# Patient Record
Sex: MALE | Race: WHITE | NOT HISPANIC OR LATINO | Employment: FULL TIME | URBAN - NONMETROPOLITAN AREA
[De-identification: names, ages, dates, MRNs, and addresses within clinical notes are randomized per-mention and may not be internally consistent; named-entity substitution may affect disease eponyms.]

---

## 2018-02-08 ENCOUNTER — OFFICE VISIT CONVERTED (OUTPATIENT)
Dept: FAMILY MEDICINE CLINIC | Age: 43
End: 2018-02-08
Attending: FAMILY MEDICINE

## 2018-09-21 ENCOUNTER — OFFICE VISIT CONVERTED (OUTPATIENT)
Dept: FAMILY MEDICINE CLINIC | Age: 43
End: 2018-09-21
Attending: FAMILY MEDICINE

## 2019-03-12 ENCOUNTER — HOSPITAL ENCOUNTER (OUTPATIENT)
Dept: OTHER | Facility: HOSPITAL | Age: 44
Discharge: HOME | End: 2019-03-12
Attending: FAMILY MEDICINE

## 2019-03-12 ENCOUNTER — OFFICE VISIT CONVERTED (OUTPATIENT)
Dept: FAMILY MEDICINE CLINIC | Age: 44
End: 2019-03-12
Attending: FAMILY MEDICINE

## 2019-03-12 LAB
ALBUMIN SERPL-MCNC: 4.3 G/DL (ref 3.5–5)
ALBUMIN/GLOB SERPL: 1.3 {RATIO} (ref 1.4–2.6)
ALP SERPL-CCNC: 120 U/L (ref 53–128)
ALT SERPL-CCNC: 36 U/L (ref 10–40)
ANION GAP SERPL CALC-SCNC: 18 MMOL/L (ref 8–19)
AST SERPL-CCNC: 22 U/L (ref 15–50)
BILIRUB SERPL-MCNC: 0.55 MG/DL (ref 0.2–1.3)
BUN SERPL-MCNC: 12 MG/DL (ref 5–25)
BUN/CREAT SERPL: 18 {RATIO} (ref 6–20)
CALCIUM SERPL-MCNC: 9.4 MG/DL (ref 8.7–10.4)
CHLORIDE SERPL-SCNC: 99 MMOL/L (ref 99–111)
CHOLEST SERPL-MCNC: 135 MG/DL (ref 107–200)
CHOLEST/HDLC SERPL: 3.1 {RATIO} (ref 3–6)
CONV CO2: 23 MMOL/L (ref 22–32)
CONV CREATININE URINE, RANDOM: 58.8 MG/DL (ref 10–300)
CONV MICROALBUM.,U,RANDOM: <12 MG/L (ref 0–20)
CONV TOTAL PROTEIN: 7.6 G/DL (ref 6.3–8.2)
CREAT UR-MCNC: 0.68 MG/DL (ref 0.7–1.2)
ERYTHROCYTE [DISTWIDTH] IN BLOOD BY AUTOMATED COUNT: 11.4 % (ref 11.5–14.5)
EST. AVERAGE GLUCOSE BLD GHB EST-MCNC: 148 MG/DL
GFR SERPLBLD BASED ON 1.73 SQ M-ARVRAT: >60 ML/MIN/{1.73_M2}
GLOBULIN UR ELPH-MCNC: 3.3 G/DL (ref 2–3.5)
GLUCOSE SERPL-MCNC: 219 MG/DL (ref 70–99)
HBA1C MFR BLD: 17.7 G/DL (ref 14–18)
HBA1C MFR BLD: 6.8 % (ref 3.5–5.7)
HCT VFR BLD AUTO: 51.2 % (ref 42–52)
HDLC SERPL-MCNC: 43 MG/DL (ref 40–60)
LDLC SERPL CALC-MCNC: 75 MG/DL (ref 70–100)
MCH RBC QN AUTO: 33.1 PG (ref 27–31)
MCHC RBC AUTO-ENTMCNC: 34.6 G/DL (ref 33–37)
MCV RBC AUTO: 95.9 FL (ref 80–96)
MICROALBUMIN/CREAT UR: 20.4 MG/G{CRE} (ref 0–25)
OSMOLALITY SERPL CALC.SUM OF ELEC: 288 MOSM/KG (ref 273–304)
PLATELET # BLD AUTO: 239 10*3/UL (ref 130–400)
PMV BLD AUTO: 10.2 FL (ref 7.4–10.4)
POTASSIUM SERPL-SCNC: 3.8 MMOL/L (ref 3.5–5.3)
RBC # BLD AUTO: 5.34 10*6/UL (ref 4.7–6.1)
SODIUM SERPL-SCNC: 136 MMOL/L (ref 135–147)
TESTOST SERPL-MCNC: 417 NG/DL (ref 249–836)
TRIGL SERPL-MCNC: 85 MG/DL (ref 40–150)
TSH SERPL-ACNC: 1.42 M[IU]/L (ref 0.27–4.2)
VLDLC SERPL-MCNC: 17 MG/DL (ref 5–37)
WBC # BLD AUTO: 11.62 10*3/UL (ref 4.8–10.8)

## 2019-09-03 ENCOUNTER — OFFICE VISIT CONVERTED (OUTPATIENT)
Dept: FAMILY MEDICINE CLINIC | Age: 44
End: 2019-09-03
Attending: NURSE PRACTITIONER

## 2019-09-10 ENCOUNTER — OFFICE VISIT CONVERTED (OUTPATIENT)
Dept: FAMILY MEDICINE CLINIC | Age: 44
End: 2019-09-10
Attending: FAMILY MEDICINE

## 2019-10-26 ENCOUNTER — HOSPITAL ENCOUNTER (OUTPATIENT)
Dept: OTHER | Facility: HOSPITAL | Age: 44
Discharge: HOME | End: 2019-10-26
Attending: FAMILY MEDICINE

## 2019-10-26 LAB
ALBUMIN SERPL-MCNC: 4.4 G/DL (ref 3.5–5)
ALBUMIN/GLOB SERPL: 1.8 {RATIO} (ref 1.4–2.6)
ALP SERPL-CCNC: 101 U/L (ref 53–128)
ALT SERPL-CCNC: 32 U/L (ref 10–40)
ANION GAP SERPL CALC-SCNC: 17 MMOL/L (ref 8–19)
AST SERPL-CCNC: 22 U/L (ref 15–50)
BILIRUB SERPL-MCNC: 0.51 MG/DL (ref 0.2–1.3)
BUN SERPL-MCNC: 15 MG/DL (ref 5–25)
BUN/CREAT SERPL: 23 {RATIO} (ref 6–20)
CALCIUM SERPL-MCNC: 9 MG/DL (ref 8.7–10.4)
CHLORIDE SERPL-SCNC: 102 MMOL/L (ref 99–111)
CONV CO2: 20 MMOL/L (ref 22–32)
CONV TOTAL PROTEIN: 6.9 G/DL (ref 6.3–8.2)
CREAT UR-MCNC: 0.65 MG/DL (ref 0.7–1.2)
ERYTHROCYTE [SEDIMENTATION RATE] IN BLOOD: 3 MM/H (ref 0–20)
EST. AVERAGE GLUCOSE BLD GHB EST-MCNC: 163 MG/DL
GFR SERPLBLD BASED ON 1.73 SQ M-ARVRAT: >60 ML/MIN/{1.73_M2}
GLOBULIN UR ELPH-MCNC: 2.5 G/DL (ref 2–3.5)
GLUCOSE SERPL-MCNC: 207 MG/DL (ref 70–99)
HBA1C MFR BLD: 7.3 % (ref 3.5–5.7)
OSMOLALITY SERPL CALC.SUM OF ELEC: 287 MOSM/KG (ref 273–304)
POTASSIUM SERPL-SCNC: 4.1 MMOL/L (ref 3.5–5.3)
SODIUM SERPL-SCNC: 135 MMOL/L (ref 135–147)
URATE SERPL-MCNC: 3.1 MG/DL (ref 3.5–8.5)

## 2020-03-10 ENCOUNTER — OFFICE VISIT CONVERTED (OUTPATIENT)
Dept: FAMILY MEDICINE CLINIC | Age: 45
End: 2020-03-10
Attending: FAMILY MEDICINE

## 2020-03-10 ENCOUNTER — HOSPITAL ENCOUNTER (OUTPATIENT)
Dept: OTHER | Facility: HOSPITAL | Age: 45
Discharge: HOME | End: 2020-03-10
Attending: FAMILY MEDICINE

## 2020-03-10 LAB
ALBUMIN SERPL-MCNC: 4.7 G/DL (ref 3.5–5)
ALBUMIN/GLOB SERPL: 1.6 {RATIO} (ref 1.4–2.6)
ALP SERPL-CCNC: 103 U/L (ref 53–128)
ALT SERPL-CCNC: 44 U/L (ref 10–40)
ANION GAP SERPL CALC-SCNC: 18 MMOL/L (ref 8–19)
AST SERPL-CCNC: 25 U/L (ref 15–50)
BILIRUB SERPL-MCNC: 0.46 MG/DL (ref 0.2–1.3)
BUN SERPL-MCNC: 14 MG/DL (ref 5–25)
BUN/CREAT SERPL: 19 {RATIO} (ref 6–20)
CALCIUM SERPL-MCNC: 9.5 MG/DL (ref 8.7–10.4)
CHLORIDE SERPL-SCNC: 102 MMOL/L (ref 99–111)
CHOLEST SERPL-MCNC: 123 MG/DL (ref 107–200)
CHOLEST/HDLC SERPL: 3.2 {RATIO} (ref 3–6)
CK SERPL-CCNC: 88 U/L (ref 57–374)
CONV CO2: 20 MMOL/L (ref 22–32)
CONV CREATININE URINE, RANDOM: 68.6 MG/DL (ref 10–300)
CONV MICROALBUM.,U,RANDOM: <12 MG/L (ref 0–20)
CONV TOTAL PROTEIN: 7.6 G/DL (ref 6.3–8.2)
CREAT UR-MCNC: 0.72 MG/DL (ref 0.7–1.2)
ERYTHROCYTE [DISTWIDTH] IN BLOOD BY AUTOMATED COUNT: 11.7 % (ref 11.5–14.5)
EST. AVERAGE GLUCOSE BLD GHB EST-MCNC: 163 MG/DL
GFR SERPLBLD BASED ON 1.73 SQ M-ARVRAT: >60 ML/MIN/{1.73_M2}
GLOBULIN UR ELPH-MCNC: 2.9 G/DL (ref 2–3.5)
GLUCOSE SERPL-MCNC: 134 MG/DL (ref 70–99)
HBA1C MFR BLD: 17.4 G/DL (ref 14–18)
HBA1C MFR BLD: 7.3 % (ref 3.5–5.7)
HCT VFR BLD AUTO: 50.5 % (ref 42–52)
HDLC SERPL-MCNC: 39 MG/DL (ref 40–60)
LDLC SERPL CALC-MCNC: 76 MG/DL (ref 70–100)
MCH RBC QN AUTO: 32.6 PG (ref 27–31)
MCHC RBC AUTO-ENTMCNC: 34.5 G/DL (ref 33–37)
MCV RBC AUTO: 94.7 FL (ref 80–96)
MICROALBUMIN/CREAT UR: 17.5 MG/G{CRE} (ref 0–25)
OSMOLALITY SERPL CALC.SUM OF ELEC: 284 MOSM/KG (ref 273–304)
PLATELET # BLD AUTO: 214 10*3/UL (ref 130–400)
PMV BLD AUTO: 9.2 FL (ref 7.4–10.4)
POTASSIUM SERPL-SCNC: 4.3 MMOL/L (ref 3.5–5.3)
RBC # BLD AUTO: 5.33 10*6/UL (ref 4.7–6.1)
SODIUM SERPL-SCNC: 136 MMOL/L (ref 135–147)
TESTOST SERPL-MCNC: 534 NG/DL (ref 249–836)
TRIGL SERPL-MCNC: 39 MG/DL (ref 40–150)
TSH SERPL-ACNC: 1.24 M[IU]/L (ref 0.27–4.2)
VLDLC SERPL-MCNC: 8 MG/DL (ref 5–37)
WBC # BLD AUTO: 10.1 10*3/UL (ref 4.8–10.8)

## 2020-03-16 ENCOUNTER — OFFICE VISIT CONVERTED (OUTPATIENT)
Dept: FAMILY MEDICINE CLINIC | Age: 45
End: 2020-03-16
Attending: FAMILY MEDICINE

## 2020-03-16 ENCOUNTER — HOSPITAL ENCOUNTER (OUTPATIENT)
Dept: OTHER | Facility: HOSPITAL | Age: 45
Discharge: HOME | End: 2020-03-16
Attending: FAMILY MEDICINE

## 2020-03-18 LAB — BACTERIA SPEC AEROBE CULT: ABNORMAL

## 2020-09-18 ENCOUNTER — OFFICE VISIT CONVERTED (OUTPATIENT)
Dept: FAMILY MEDICINE CLINIC | Age: 45
End: 2020-09-18
Attending: FAMILY MEDICINE

## 2020-11-11 ENCOUNTER — HOSPITAL ENCOUNTER (OUTPATIENT)
Dept: OTHER | Facility: HOSPITAL | Age: 45
Discharge: HOME | End: 2020-11-11
Attending: FAMILY MEDICINE

## 2020-11-11 LAB
ALBUMIN SERPL-MCNC: 4.3 G/DL (ref 3.5–5)
ALBUMIN/GLOB SERPL: 1.9 {RATIO} (ref 1.4–2.6)
ALP SERPL-CCNC: 84 U/L (ref 53–128)
ALT SERPL-CCNC: 34 U/L (ref 10–40)
ANION GAP SERPL CALC-SCNC: 18 MMOL/L (ref 8–19)
AST SERPL-CCNC: 24 U/L (ref 15–50)
BILIRUB SERPL-MCNC: 0.48 MG/DL (ref 0.2–1.3)
BUN SERPL-MCNC: 18 MG/DL (ref 5–25)
BUN/CREAT SERPL: 23 {RATIO} (ref 6–20)
CALCIUM SERPL-MCNC: 9 MG/DL (ref 8.7–10.4)
CHLORIDE SERPL-SCNC: 100 MMOL/L (ref 99–111)
CONV CO2: 24 MMOL/L (ref 22–32)
CONV TOTAL PROTEIN: 6.6 G/DL (ref 6.3–8.2)
CREAT UR-MCNC: 0.77 MG/DL (ref 0.7–1.2)
EST. AVERAGE GLUCOSE BLD GHB EST-MCNC: 177 MG/DL
GFR SERPLBLD BASED ON 1.73 SQ M-ARVRAT: >60 ML/MIN/{1.73_M2}
GLOBULIN UR ELPH-MCNC: 2.3 G/DL (ref 2–3.5)
GLUCOSE SERPL-MCNC: 203 MG/DL (ref 70–99)
HBA1C MFR BLD: 7.8 % (ref 3.5–5.7)
OSMOLALITY SERPL CALC.SUM OF ELEC: 294 MOSM/KG (ref 273–304)
POTASSIUM SERPL-SCNC: 4 MMOL/L (ref 3.5–5.3)
SODIUM SERPL-SCNC: 138 MMOL/L (ref 135–147)

## 2020-11-24 ENCOUNTER — OFFICE VISIT CONVERTED (OUTPATIENT)
Dept: FAMILY MEDICINE CLINIC | Age: 45
End: 2020-11-24
Attending: FAMILY MEDICINE

## 2021-05-18 NOTE — PROGRESS NOTES
"Ayden Murillo  1975     Office/Outpatient Visit    Visit Date: Mon, Mar 16, 2020 03:49 pm    Provider: Arik Franco MD (Assistant: Mandy Keane MA)    Location: [facility]        Electronically signed by Arik Franco MD on  03/17/2020 06:38:30 AM                             Subjective:        CC: cough and sinuses    HPI:           Patient to be evaluated for cough.  It has been present for the past 5 days.  Respiratory symptoms include cough and sinus pressure.  Other symptoms include body aches, eye watering, fever to about 100 degrees, nasal congestion and sneezing.  Sputum is described as scant.  He denies exposure to ill contacts.      ROS:         CARDIOVASCULAR:  Negative for chest pain and palpitations.      GASTROINTESTINAL:  Negative for abdominal pain, nausea and vomiting.      INTEGUMENTARY:  Negative for atypical mole(s) and rash.      PSYCHIATRIC:  Negative for anxiety and depression.          Past Medical History / Family History / Social History:         Last Reviewed on 3/10/2020 10:47 AM by Arik Franco    Past Medical History:             PAST MEDICAL HISTORY         Asthma    Type 2 Diabetes    Hyperlipidemia    Hypertension         PREVENTIVE HEALTH MAINTENANCE             EYE EXAM: Diabetic Eye Exam during this calendar year and results are in chart was last done 10/2018         Surgical History:     NONE         Family History:         Positive for Coronary Artery Disease ( pat. GM ) and Hypertension ( mother; brother ).      Positive for Asthma ( mother ).      Positive for Cerebrovascular Accident ( pat. GM ).          Social History:     Occupation: Flowers     Marital Status:      Children: None         Tobacco/Alcohol/Supplements:     Last Reviewed on 3/10/2020 10:47 AM by Arik Franco    Tobacco: Current Smoker: He currently smokes every day, 1 pack per day.  \"maybe 6 every 2-3 weeks\"         Substance Abuse " History:     Last Reviewed on 3/10/2020 10:47 AM by Arik Franco    NEGATIVE         Mental Health History:     Last Reviewed on 3/10/2020 10:47 AM by Arik Franco        Communicable Diseases (eg STDs):     Last Reviewed on 3/10/2020 10:47 AM by Arik Franco        Current Problems:     Last Reviewed on 3/10/2020 10:47 AM by Arik Franco    Essential (primary) hypertension    Other male erectile dysfunction    Type 2 diabetes mellitus without complications    Mixed hyperlipidemia    Other fatigue    Encounter for immunization        Immunizations:     influenza, injectable, quadrivalent, preservative free (FLUARIX QUAD 2221-4556) 3/10/2020    Hep A, adult dose 9/21/2018    zzFluzone pf-quadrivalent 3 and up 9/17/2014    Fluzone (3 + years dose) 12/13/2013    Fluzone pf (3+ years dose) 1/19/2016    Fluzone Quadrivalent (3+ years) 9/21/2018    Influenza Virus Vaccine pf (adult) 2/8/2018    PNEUMOVAX 23 (Pneumococcal PPV23) 12/13/2013        Allergies:     Last Reviewed on 3/16/2020 03:51 PM by Mandy Keane    No Known Allergies.        Current Medications:     Last Reviewed on 3/16/2020 03:51 PM by Mandy Keane    Lotrel 5-20 mg oral capsule [Take 1 capsule(s) by mouth daily]    metFORMIN 500 mg oral Tablet, Extended Release 24 hr [Take 2 tablet(s) by mouth daily]    montelukast 10 mg oral tablet [take 1 tablet (10 mg) by oral route once daily in the evening]    Blood Glucose Test strips  [Check blood sugar once daily before breakfast]    Lipitor 10 mg oral tablet [Take 1 tablet(s) by mouth daily]    Zyrtec 10 mg oral tablet [1 tab daily]    Benadryl Allergy 25 mg oral tablet [2 TAB DAILY]    aspirin 81 mg oral tablet, delayed release (enteric coated) [1 tab daily]    cyclobenzaprine 10 mg oral tablet [Take 1/2 to 1 tablet every 8 hours as needed for muscle spasm]    Ventolin HFA 90mcg/1actuation Oral Inhaler [Inhale 2 puff(s) by mouth 4 times a day as needed]    Glyxambi  25-5 mg oral tablet [take 1 tablet by oral route once daily in the morning]        Objective:        Vitals:         Current: 3/16/2020 3:53:12 PM    Ht:  5 ft, 8 in;  Wt: 192.8 lbs;  BMI: 29.3T: 98.4 F (oral);  BP: 147/90 mm Hg (left arm, sitting);  P: 101 bpm (left arm (BP Cuff), sitting);  sCr: 0.72 mg/dL;  GFR: 126.01        Exams:     PHYSICAL EXAM:     GENERAL: Vitals recorded well developed, well nourished;     EYES: extraocular movements intact; conjunctiva and cornea are normal; PERRL;     E/N/T: EARS:  normal external auditory canals and tympanic membranes;  grossly normal hearing; OROPHARYNX:  normal mucosa, dentition, gingiva, and posterior pharynx;     NECK: range of motion is normal; thyroid is non-palpable;     RESPIRATORY: normal respiratory rate and pattern with no distress; normal breath sounds with no rales, rhonchi, wheezes or rubs;     CARDIOVASCULAR: normal rate; rhythm is regular;  no systolic murmur;     GASTROINTESTINAL: nontender; normal bowel sounds; no masses;     LYMPHATIC: no enlargement of cervical or facial nodes; no supraclavicular nodes;     SKIN:  no significant rashes or lesions; no suspicious moles;     NEUROLOGIC: mental status: alert and oriented x 3; cranial nerves II-XII grossly intact;     PSYCHIATRIC: appropriate affect and demeanor; normal psychomotor function;         Lab/Test Results:         Influenza A: Negative (06/28/2019),     Influenza B: Positive (06/28/2019),     Performed by:: ANNETTE (06/28/2019),             Assessment:         R05   Cough           ORDERS:         Lab Orders:       [id number]  Infectious agent antigen detection by immunoassay; Influenza  (In-House)            84165  Infectious agent antigen detection by immunoassay; Influenza  (In-House)            00100  [facility] Rapid strep A  (In-House)            25081  [facility] Throat culture, strep  (Send-Out)                      Plan:         Cough    LABORATORY:  Labs ordered to be performed today  include Flu A&B Flu A Flu B and rapid strep test.      RECOMMENDATIONS given include: Ayden is hopefully on the mend from what sounds like a viral upper respiratory infection.  He has had some cough, but this is only part of his presentation.  We will screen him for flu and strep as noted.  I am not recommending any X-rays or other testing.  He seems well.  He does not have fever to suggest COVID-19 and his exam is reassuring.  We will keep him off work until Friday when he will return.  If he does not feel better later in the week, he will call as well..          ADDENDUM - Patient did test positive for flu B.  However, he is outside of the window where treatment would be expected to benefit him.  I spoke with him shortly after the test resulted and made him aware.  We will move forward with plan as above.  No other changes for now. - Arik Franco MD - 3/17/20 - 04:18          Orders:       [id number]  Infectious agent antigen detection by immunoassay; Influenza  (In-House)            34362  Infectious agent antigen detection by immunoassay; Influenza  (In-House)            64886  [facility] Rapid strep A  (In-House)            94482  [facility] Throat culture, strep  (Send-Out)                  Charge Capture:         Primary Diagnosis:     R05  Cough           Orders:      [id number]  Office/outpatient visit; established patient, level 3  (In-House)            41400  Infectious agent antigen detection by immunoassay; Influenza  (In-House)            53425  Infectious agent antigen detection by immunoassay; Influenza  (In-House)            98352  [facility] Rapid strep A  (In-House)

## 2021-05-18 NOTE — PROGRESS NOTES
Ayden MurilloRuben 1975     Office/Outpatient Visit    Visit Date: Tue, Sep 3, 2019 01:52 pm    Provider: Anika Saravia N.P. (Assistant: Angeline Bonilla MA)    Location: [facility]        Electronically signed by Anika Saravia N.P. on  09/03/2019 02:11:52 PM                             SUBJECTIVE:        CC:     Mr. Murillo is a 44 year old White male.  presents today due to congestion         HPI:         Patient complains of acute sinusitis, other.  This has been a problem for the past week.  This is an acute problem without chronic or recurrent episodes.  His primary symptoms include chest congestion, productive cough, facial pressure, headache, nasal congestion and post-nasal drip.  He denies fever.  He has already tried a decongestant and an antihistamine.  Medical history is pertinent for allergies and DM and SMoker.          PHQ-9 Depression Screening: Completed form scanned and in chart; Total Score 4 Alcohol Consumption Screening: Completed form scanned and in chart; Total Score 4     ROS:     CONSTITUTIONAL:  Negative for chills, fatigue and fever.      E/N/T:  Positive for nasal congestion and sinus pressure.   Negative for sore throat.      CARDIOVASCULAR:  Negative for chest pain, orthopnea, paroxysmal nocturnal dyspnea and pedal edema.      RESPIRATORY:  Positive for recent cough ( with scant amounts of purulent sputum ).   Negative for dyspnea.      GASTROINTESTINAL:  Negative for abdominal pain, heartburn, constipation, diarrhea, and stool changes.      PSYCHIATRIC:  Negative for anxiety and depression.          PM/FM/SH:     Last Reviewed on 9/03/2019 02:11 PM by Anika Saravia    Past Medical History:             PAST MEDICAL HISTORY         Asthma    Type 2 Diabetes    Hyperlipidemia    Hypertension         PREVENTIVE HEALTH MAINTENANCE             EYE EXAM: Diabetic Eye Exam during this calendar year and results are in chart was last done 10/2018         Surgical  "History:     NONE         Family History:         Positive for Coronary Artery Disease ( pat. GM ) and Hypertension ( mother; brother ).      Positive for Asthma ( mother ).      Positive for Cerebrovascular Accident ( pat. GM ).          Social History:     Occupation: Flowers     Marital Status:      Children: None         Tobacco/Alcohol/Supplements:     Last Reviewed on 9/03/2019 02:11 PM by Anika Saravia    Tobacco: Current Smoker: He currently smokes every day, 1 pack per day.  \"maybe 6 every 2-3 weeks\"         Mental Health History:     Last Reviewed on 9/03/2019 02:11 PM by Anika Saravia            Current Problems:     Last Reviewed on 9/03/2019 02:11 PM by Anika Saravia    Depression with anxiety     Hypercholesterolemia     Allergic rhinitis     Type 2 diabetes     Erectile dysfunction due to organic reasons     Essential hypertension     Acute sinusitis, other         Immunizations:     Hep A, adult dose 9/21/2018     zzFluzone pf-quadrivalent 3 and up 9/17/2014     Fluzone (3 + years dose) 12/13/2013     Fluzone pf (3+ years dose) 1/19/2016     Fluzone Quadrivalent (3+ years) 9/21/2018     Influenza Virus Vaccine pf (adult) 2/8/2018     PNEUMOVAX 23 (Pneumococcal PPV23) 12/13/2013         Allergies:     Last Reviewed on 9/03/2019 02:11 PM by Anika Saravia      No Known Drug Allergies.         Current Medications:     Last Reviewed on 9/03/2019 02:11 PM by Anika Saravia    Cyclobenzaprine HCl 10mg Tablet Take 1/2 to 1 tablet every 8 hours as needed for muscle spasm     Jardiance 25mg Tablet Take 1 tablet(s) by mouth qam     Lipitor 10mg Tablet Take 1 tablet(s) by mouth daily     Lotrel 5mg/20mg Capsules Take 1 capsule(s) by mouth daily     Metformin HCl 500mg Tablets, Extended Release Take 2 tablet(s) by mouth daily     Glucose Reagent Blood Test Strips  Reagent Strips Check blood sugar once daily before breakfast     Aspirin (ASA) 81mg Tablets, Enteric Coated 1 tab daily "     Benadryl Allergy 25mg Tablet 2 TAB DAILY     Zyrtec 10mg Tablet 1 tab daily         OBJECTIVE:        Vitals:         Current: 9/3/2019 1:56:01 PM    Ht:  5 ft, 8 in;  Wt: 192 lbs;  BMI: 29.2    T: 98.1 F (oral);  BP: 136/88 mm Hg (left arm, sitting);  P: 70 bpm (left arm (BP Cuff), sitting);  sCr: 0.68 mg/dL;  GFR: 133.19        Exams:     PHYSICAL EXAM:     GENERAL: Vitals recorded well developed, well nourished;  well groomed;  no apparent distress;     E/N/T: NOSE: bilateral frontal sinus tenderness present;     RESPIRATORY: normal respiratory rate and pattern with no distress; normal breath sounds with no rales, rhonchi, wheezes or rubs;     CARDIOVASCULAR: normal rate; rhythm is regular;  normal S1; normal S2; no systolic murmur; no cyanosis; no edema;     GASTROINTESTINAL: nontender, nondistended; no hepatosplenomegaly or masses; no bruits;     NEUROLOGIC: GROSSLY INTACT     PSYCHIATRIC:  appropriate affect and demeanor; normal speech pattern; grossly normal memory;         ASSESSMENT:           461.8   J01.00  Acute sinusitis, other              DDx:     V79.0   Z13.89  Screening for depression              DDx:         ORDERS:         Meds Prescribed:       Guaifenesin 200mg Tablet take 2 tablets twice daily  #40 (Forty) tablet(s) Refills: 0       Bromfed-DM (Brompheniramine/Dextromethorphan/Pseudoephedrine) Syrup 1  to 2  tsp po every 4-6 hrs prn cough/congestion.  #240 ([place] [address] [place]) ml Refills: 0       Augmentin (Amoxicillin/Clavulanate) 875mg/125mg Tablet 1 po bid with food  #20 (Twenty) tablet(s) Refills: 0         Other Orders:         Depression screen negative  (In-House)           Negative EtOH screen  (In-House)                   PLAN:          Acute sinusitis, other     MIPS PHQ-9 Depression Screening: Completed form scanned and in chart; Total Score 4; Negative Depression Screen Negative alcohol screen School/Work Excuse for Work note Friday and today .             Prescriptions:       Guaifenesin 200mg Tablet take 2 tablets twice daily  #40 (Forty) tablet(s) Refills: 0       Bromfed-DM (Brompheniramine/Dextromethorphan/Pseudoephedrine) Syrup 1  to 2  tsp po every 4-6 hrs prn cough/congestion.  #240 ([address] [place] [address] [place]) ml Refills: 0       Augmentin (Amoxicillin/Clavulanate) 875mg/125mg Tablet 1 po bid with food  #20 (Twenty) tablet(s) Refills: 0           Orders:         Depression screen negative  (In-House)           Negative EtOH screen  (In-House)               CHARGE CAPTURE:           Primary Diagnosis:     461.8 Acute sinusitis, other            J01.00    Acute maxillary sinusitis, unspecified              Orders:          49607   Office/outpatient visit; established patient, level 3  (In-House)                Depression screen negative  (In-House)                Negative EtOH screen  (In-House)           V79.0 Screening for depression            Z13.89    Encounter for screening for other disorder

## 2021-05-18 NOTE — PROGRESS NOTES
Ayden Murillo  1975     Office/Outpatient Visit    Visit Date: Tue, Nov 24, 2020 03:19 pm    Provider: Arik Franco MD (Assistant: Hien Roche MA)    Location: [facility]        Electronically signed by Arik Franco MD on  11/25/2020 09:23:06 AM                             Subjective:        CC: new onset atrial fibrillation    HPI:       Ayden is in today for follow up on irregular heart beat.  He was noted to have this on recent evaluation at [facility].  He brings EKG today that appears to show a fib with rapid response.  He did have recent blood work that looked good other than some elevation of his A1C.  This was something which was noted on exam.  He is not having obvious symptoms related to this.  He is not having obvious chest pain or shortness of breath.  He has some occasional dizziness at work when he bends over and then raises up.  He admits there is a lot of stress present in his life.  He does smoke.  He does have appointment with cardiology scheduled for next week.          Dx with essential (primary) hypertension; his current cardiac medication regimen includes a combination medication ( Lotrel ).  Mr. Murillo does not check his blood pressure other than at his clinic appointments.  He is tolerating the medication well without side effects.  Compliance with treatment has been good; he takes his medication as directed and follows up as directed.            Additionally, he presents with history of type 2 diabetes mellitus without complications.  current meds include an oral hypoglycemic ( Glucophage XR and Glyxambi ).  Most recent lab results include Hemoglobin A1c:  6.6 (07/06/2020),  7.8 (%) (11/11/2020), LDL:  76 (mg/dL) (03/10/2020).      ROS:     CONSTITUTIONAL:  Negative for chills and fever.      CARDIOVASCULAR:  Negative for chest pain and palpitations.      RESPIRATORY:  Negative for recent cough and dyspnea.      GASTROINTESTINAL:  Negative for  "abdominal pain, nausea and vomiting.      INTEGUMENTARY:  Negative for atypical mole(s) and rash.          Past Medical History / Family History / Social History:         Last Reviewed on 9/18/2020 10:23 AM by Arik Franco    Past Medical History:             PAST MEDICAL HISTORY         Asthma    Type 2 Diabetes    Hyperlipidemia    Hypertension         PREVENTIVE HEALTH MAINTENANCE             EYE EXAM: Diabetic Eye Exam during this calendar year and results are in chart was last done 10/2018         Surgical History:     NONE         Family History:         Positive for Coronary Artery Disease ( pat. GM ) and Hypertension ( mother; brother ).      Positive for Asthma ( mother ).      Positive for Cerebrovascular Accident ( pat. GM ).          Social History:     Occupation: Flowers     Marital Status:      Children: None         Tobacco/Alcohol/Supplements:     Last Reviewed on 9/18/2020 10:23 AM by Arik Franco    Tobacco: Current Smoker: He currently smokes every day, 1 pack per day.  \"maybe 6 every 2-3 weeks\"         Substance Abuse History:     Last Reviewed on 9/18/2020 10:23 AM by Arik Franco    NEGATIVE         Mental Health History:     Last Reviewed on 9/18/2020 10:23 AM by Arik Franco        Communicable Diseases (eg STDs):     Last Reviewed on 9/18/2020 10:23 AM by Arik Franco        Current Problems:     Last Reviewed on 9/18/2020 10:23 AM by Arik Franco    Essential (primary) hypertension    Other male erectile dysfunction    Type 2 diabetes mellitus without complications    Mixed hyperlipidemia    Other fatigue    Encounter for immunization    Cough        Immunizations:     influenza, injectable, quadrivalent, preservative free (FLUARIX QUAD 2985-2691) 3/10/2020    influenza, injectable, quadrivalent, preservative free (FLUZONE QUAD 4336-7894) 9/18/2020    Hep A, adult dose 9/21/2018    zzFluzone pf-quadrivalent 3 and up " 9/17/2014    Fluzone (3 + years dose) 12/13/2013    Fluzone pf (3+ years dose) 1/19/2016    Fluzone Quadrivalent (3+ years) 9/21/2018    Influenza Virus Vaccine pf (adult) 2/8/2018    PNEUMOVAX 23 (Pneumococcal PPV23) 12/13/2013        Allergies:     Last Reviewed on 9/18/2020 10:23 AM by Arik Franco    No Known Allergies.        Current Medications:     Last Reviewed on 9/18/2020 10:23 AM by Arik Franco    LotreL 5-20 mg oral capsule [Take 1 capsule by mouth once daily]    Blood Glucose Test strips  [Check blood sugar once daily before breakfast]    atorvastatin 10 mg oral tablet [Take 1 tablet by mouth once daily]    Zyrtec 10 mg oral tablet [1 tab daily]    Benadryl Allergy 25 mg oral tablet [2 TAB DAILY]    aspirin 81 mg oral tablet, delayed release (enteric coated) [1 tab daily]    cyclobenzaprine 10 mg oral tablet [Take 1/2 to 1 tablet every 8 hours as needed for muscle spasm]    Ventolin HFA 90mcg/1actuation Oral Inhaler [Inhale 2 puff(s) by mouth 4 times a day as needed]    Glyxambi 25-5 mg oral tablet [take 1 tablet by oral route once daily in the morning]    metFORMIN 500 mg oral tablet [TAKE 1 TABLET BY MOUTH TWICE DAILY WITH MORNING MEAL AND WITH EVENING MEAL]        Objective:        Vitals:         Current: 11/24/2020 3:23:35 PM    Ht:  5 ft, 8 in;  Wt: 199 lbs;  BMI: 30.3T: 98.6 F (temporal);  BP: 123/80 mm Hg (left arm, sitting);  P: 106 bpm (left arm (BP Cuff), sitting);  sCr: 0.77 mg/dL;  GFR: 118.20        Exams:     PHYSICAL EXAM:     GENERAL: Vitals recorded well developed, well nourished;     EYES: extraocular movements intact; conjunctiva and cornea are normal; PERRL;     NECK: range of motion is normal; thyroid is non-palpable;     RESPIRATORY: normal respiratory rate and pattern with no distress; normal breath sounds with no rales, rhonchi, wheezes or rubs;     CARDIOVASCULAR: mildly tachycardic;  rhythm is irregularly irregular;  no systolic murmur;      GASTROINTESTINAL: nontender; normal bowel sounds; no masses;     LYMPHATIC: no enlargement of cervical or facial nodes; no supraclavicular nodes;     SKIN:  no significant rashes or lesions; no suspicious moles;     NEUROLOGIC: mental status: alert and oriented x 3; cranial nerves II-XII grossly intact;     PSYCHIATRIC: appropriate affect and demeanor; normal psychomotor function;         Assessment:         I48.91   Unspecified atrial fibrillation       I10   Essential (primary) hypertension       E11.9   Type 2 diabetes mellitus without complications           ORDERS:         Meds Prescribed:       [New Rx] metoprolol succinate 25 mg oral Tablet, Extended Release 24 hr [take 1 tablet (25 mg) by oral route once daily], #30 (thirty) tablets, Refills: 0 (zero)       [New Rx] aspirin 325 mg oral tablet, delayed release (enteric coated) [take 1 tablet (325 mg) by oral route once daily], #30 (thirty) tablets, Refills: 0 (zero)         Procedures Ordered:       REFER  Referral to Specialist or Other Facility  (Send-Out)                      Plan:         Unspecified atrial fibrillation        REFERRALS:  Referral initiated to a cardiologist.      RECOMMENDATIONS given include: Ayden has developed a fib.  There is not obvious evidence of acute ischemia today.  However, he does have cardiac risk factors.  He is scheduled to see cardiology next week.  I do think that is reasonable.  We will start him on a low dose of beta blocker now along with a full dose aspirin once daily.  I don't expect problems, but if he were to feel like he might pass out, have chest pain, or shortness of breath that was significant, then an ER visit might be necessary.  I have asked that he stop using caffeine for now.  We will follow closely.  No change in his blood pressure or diabetes care for today.  We have recently reviewed his labs..            Prescriptions:       [New Rx] metoprolol succinate 25 mg oral Tablet, Extended Release 24 hr [take  1 tablet (25 mg) by oral route once daily], #30 (thirty) tablets, Refills: 0 (zero)       [New Rx] aspirin 325 mg oral tablet, delayed release (enteric coated) [take 1 tablet (325 mg) by oral route once daily], #30 (thirty) tablets, Refills: 0 (zero)           Orders:       REFER  Referral to Specialist or Other Facility  (Send-Out)              Essential (primary) hypertensionAs above.        Type 2 diabetes mellitus without complicationsAs above.            Charge Capture:         Primary Diagnosis:     I48.91  Unspecified atrial fibrillation           Orders:      71303  Office/outpatient visit; established patient, level 4  (In-House)              I10  Essential (primary) hypertension     E11.9  Type 2 diabetes mellitus without complications

## 2021-05-18 NOTE — PROGRESS NOTES
Lidia Ayden Bowden  1975     Office/Outpatient Visit    Visit Date: Fri, Sep 18, 2020 09:52 am    Provider: Arik Franco MD (Assistant: Mary Sheldon RN)    Location: [facility]        Electronically signed by Arik Franco MD on  09/19/2020 07:50:49 AM                             Subjective:        CC: diabetes, blood pressure, cholesterol    HPI:           Patient presents with type 2 diabetes mellitus without complications.  Current meds include an oral hypoglycemic ( Glyxambi ).  He reports home blood glucose readings have been excellent, with average fasting glucoses running <120 mg/dL. He checks his glucose 1 to 2 times daily.  Most recent lab results include Hemoglobin A1c:  7.3 (%) (03/10/2020),  6.6 (07/06/2020), LDL:  76 (mg/dL) (03/10/2020).            Additionally, he presents with history of essential (primary) hypertension.  his current cardiac medication regimen includes a combination medication ( Lotrel ).  Mr. Murillo does not check his blood pressure other than at his clinic appointments.  He is tolerating the medication well without side effects.  Compliance with treatment has been poor; he runs out of medication and doesn't follow up.            Dx with mixed hyperlipidemia; current treatment includes Lipitor.  Compliance with treatment has been good; he takes his medication as directed and follows up as directed.  He denies experiencing any hypercholesterolemia related symptoms.      ROS:     CONSTITUTIONAL:  Negative for chills and fever.      CARDIOVASCULAR:  Negative for chest pain and palpitations.      RESPIRATORY:  Negative for recent cough and dyspnea.      GASTROINTESTINAL:  Negative for abdominal pain, nausea and vomiting.      INTEGUMENTARY:  Negative for atypical mole(s) and rash.          Past Medical History / Family History / Social History:         Last Reviewed on 9/18/2020 10:23 AM by Arik Franco    Past Medical History:          "    PAST MEDICAL HISTORY         Asthma    Type 2 Diabetes    Hyperlipidemia    Hypertension         PREVENTIVE HEALTH MAINTENANCE             EYE EXAM: Diabetic Eye Exam during this calendar year and results are in chart was last done 10/2018         Surgical History:     NONE         Family History:         Positive for Coronary Artery Disease ( pat. GM ) and Hypertension ( mother; brother ).      Positive for Asthma ( mother ).      Positive for Cerebrovascular Accident ( pat. GM ).          Social History:     Occupation: Flowers     Marital Status:      Children: None         Tobacco/Alcohol/Supplements:     Last Reviewed on 9/18/2020 10:23 AM by Arik Franco    Tobacco: Current Smoker: He currently smokes every day, 1 pack per day.  \"maybe 6 every 2-3 weeks\"         Substance Abuse History:     Last Reviewed on 9/18/2020 10:23 AM by Arik Franco    NEGATIVE         Mental Health History:     Last Reviewed on 9/18/2020 10:23 AM by Arik Franco        Communicable Diseases (eg STDs):     Last Reviewed on 9/18/2020 10:23 AM by Arik Franco        Current Problems:     Last Reviewed on 9/18/2020 10:23 AM by Arik Franco    Essential (primary) hypertension    Other male erectile dysfunction    Type 2 diabetes mellitus without complications    Mixed hyperlipidemia    Other fatigue    Encounter for immunization    Cough        Immunizations:     influenza, injectable, quadrivalent, preservative free (FLUARIX QUAD 3767-7418) 3/10/2020    Hep A, adult dose 9/21/2018    zzFluzone pf-quadrivalent 3 and up 9/17/2014    Fluzone (3 + years dose) 12/13/2013    Fluzone pf (3+ years dose) 1/19/2016    Fluzone Quadrivalent (3+ years) 9/21/2018    Influenza Virus Vaccine pf (adult) 2/8/2018    PNEUMOVAX 23 (Pneumococcal PPV23) 12/13/2013        Allergies:     Last Reviewed on 9/18/2020 10:23 AM by Arik Franco    No Known Allergies.        Current Medications: "     Last Reviewed on 9/18/2020 10:23 AM by Arik Franco    LotreL 5-20 mg oral capsule [Take 1 capsule by mouth once daily]    Blood Glucose Test strips  [Check blood sugar once daily before breakfast]    atorvastatin 10 mg oral tablet [Take 1 tablet by mouth once daily]    Zyrtec 10 mg oral tablet [1 tab daily]    Benadryl Allergy 25 mg oral tablet [2 TAB DAILY]    aspirin 81 mg oral tablet, delayed release (enteric coated) [1 tab daily]    cyclobenzaprine 10 mg oral tablet [Take 1/2 to 1 tablet every 8 hours as needed for muscle spasm]    Ventolin HFA 90mcg/1actuation Oral Inhaler [Inhale 2 puff(s) by mouth 4 times a day as needed]    Glyxambi 25-5 mg oral tablet [take 1 tablet by oral route once daily in the morning]    metFORMIN 500 mg oral tablet [TAKE 1 TABLET BY MOUTH TWICE DAILY WITH MORNING MEAL AND WITH EVENING MEAL]        Objective:        Vitals:         Current: 9/18/2020 9:58:48 AM    Ht:  5 ft, 8 in;  Wt: 185.8 lbs;  BMI: 28.3T: 97.1 F (temporal);  BP: 119/93 mm Hg (left arm, sitting);  P: 78 bpm (left arm (BP Cuff), sitting);  sCr: 0.72 mg/dL;  GFR: 122.77        Exams:     PHYSICAL EXAM:     GENERAL: Vitals recorded well developed, well nourished;     EYES: extraocular movements intact; conjunctiva and cornea are normal; PERRL;     E/N/T: EARS:  normal external auditory canals and tympanic membranes;  grossly normal hearing;     NECK: range of motion is normal; thyroid is non-palpable;     RESPIRATORY: normal respiratory rate and pattern with no distress; normal breath sounds with no rales, rhonchi, wheezes or rubs;     CARDIOVASCULAR: normal rate; rhythm is regular;  no systolic murmur;     GASTROINTESTINAL: nontender; normal bowel sounds; no masses;     LYMPHATIC: no enlargement of cervical or facial nodes; no supraclavicular nodes;     SKIN:  no significant rashes or lesions; no suspicious moles;     NEUROLOGIC: mental status: alert and oriented x 3; cranial nerves II-XII grossly  intact;     PSYCHIATRIC: appropriate affect and demeanor; normal psychomotor function;         Assessment:         E11.9   Type 2 diabetes mellitus without complications       I10   Essential (primary) hypertension       E78.2   Mixed hyperlipidemia       Z23   Encounter for immunization           ORDERS:         Meds Prescribed:       [Refilled] Glyxambi 25-5 mg oral tablet [take 1 tablet by oral route once daily in the morning], #90 (ninety) tablets, Refills: 1 (one)       [Refilled] atorvastatin 10 mg oral tablet [Take 1 tablet by mouth once daily], #90 (ninety) tablets, Refills: 1 (one)       [Refilled] LotreL 5-20 mg oral capsule [Take 1 capsule by mouth once daily], #90 (ninety) capsules, Refills: 1 (one)       [Refilled] metFORMIN 500 mg oral tablet [TAKE 1 TABLET BY MOUTH TWICE DAILY WITH MORNING MEAL AND WITH EVENING MEAL], #180 (one hundred and eighty) tablets, Refills: 1 (one)         Lab Orders:       45855  COMP - [facility] Comp. Metabolic Panel  (Send-Out)            34632  A1CEG - [facility] Hemoglobin A1C  (Send-Out)              Procedures Ordered:       21726  Immunization administration; one vaccine  (In-House)              Other Orders:       22493  Influenza virus vaccine, quadrivalent, split virus, preservative free 3 years of age & older  (In-House)              Depression screen negative  (In-House)                      Plan:         Type 2 diabetes mellitus without complications    LABORATORY:  Labs ordered to be performed today include Comprehensive metabolic panel and HgbA1C.      RECOMMENDATIONS given include: Today, we have reviewed Ayden' care.  I'm going to refill his usual medications as noted and contact him in a few weeks for labs.  No other near term changes are anticipated..  MIPS PHQ-9 Depression Screening: Completed form scanned and in chart; Total Score 1; Negative Depression Screen  Smoking cessation encouraged. Counseling for less than 3 minutes.            Prescriptions:        [Refilled] Glyxambi 25-5 mg oral tablet [take 1 tablet by oral route once daily in the morning], #90 (ninety) tablets, Refills: 1 (one)       [Refilled] atorvastatin 10 mg oral tablet [Take 1 tablet by mouth once daily], #90 (ninety) tablets, Refills: 1 (one)       [Refilled] LotreL 5-20 mg oral capsule [Take 1 capsule by mouth once daily], #90 (ninety) capsules, Refills: 1 (one)       [Refilled] metFORMIN 500 mg oral tablet [TAKE 1 TABLET BY MOUTH TWICE DAILY WITH MORNING MEAL AND WITH EVENING MEAL], #180 (one hundred and eighty) tablets, Refills: 1 (one)           Orders:       61725  COMP - [facility] Comp. Metabolic Panel  (Send-Out)            55111  A1CEG - [facility] Hemoglobin A1C  (Send-Out)              Depression screen negative  (In-House)              Essential (primary) hypertensionAs above.        Mixed hyperlipidemiaAs above.        Encounter for immunization          Immunizations:       99307  Immunization administration; one vaccine  (In-House)            60446  Influenza virus vaccine, quadrivalent, split virus, preservative free 3 years of age & older  (In-House)                Dose (ml): 0.5  Site: left deltoid  Route: intramuscular  Administered by: Mary Sheldon          : [employer]  Lot #: [id number]  Exp: 06/30/2021          NDC: [address]-24            Charge Capture:         Primary Diagnosis:     E11.9  Type 2 diabetes mellitus without complications           Orders:      01540  Office/outpatient visit; established patient, level 4  (In-House)              Depression screen negative  (In-House)              I10  Essential (primary) hypertension     E78.2  Mixed hyperlipidemia     Z23  Encounter for immunization           Orders:      96123  Immunization administration; one vaccine  (In-House)            11874  Influenza virus vaccine, quadrivalent, split virus, preservative free 3 years of age & older  (In-House)

## 2021-05-18 NOTE — PROGRESS NOTES
Ayden Murillo 1975     Office/Outpatient Visit    Visit Date: Tue, Mar 12, 2019 12:55 pm    Provider: Arik Franco MD (Assistant: Mary Sheldon RN)    Location: [facility]        Electronically signed by Arik Franco MD on  03/13/2019 08:05:34 AM                             SUBJECTIVE:        CC: diabetes, blood pressure, cholesterol         HPI:         Patient presents with type 2 diabetes.  Current meds include an oral hypoglycemic ( Glucophage XR and Jardiance ).  He reports home blood glucose readings have been fairly good, with average fasting glucoses running in the 120-150 mg/dL range. have been a bit high, with average fasting readings in the 150-180 mg/dL range.  Most recent lab results include Hemoglobin A1c:  6.5 (%) (09/21/2018), LDL:  77 (mg/dL) (09/21/2018).          Concerning hypercholesterolemia, current treatment includes Lipitor.  Compliance with treatment has been good; he takes his medication as directed and follows up as directed.  He denies experiencing any hypercholesterolemia related symptoms.          With regard to the essential hypertension, his current cardiac medication regimen includes a combination medication ( Lotrel ).  Mr. Murillo does not check his blood pressure other than at his clinic appointments.  He is tolerating the medication well without side effects.  Compliance with treatment has been poor; he runs out of medication and doesn't follow up.      ROS:     CONSTITUTIONAL:  Negative for chills and fever.      CARDIOVASCULAR:  Negative for chest pain and palpitations.      RESPIRATORY:  Negative for recent cough and dyspnea.      GASTROINTESTINAL:  Negative for abdominal pain, nausea and vomiting.          PMH/FM/SH:     Last Reviewed on 3/12/2019 01:07 PM by Arik Franco    Past Medical History:             PAST MEDICAL HISTORY         Asthma    Type 2 Diabetes    Hyperlipidemia    Hypertension         Surgical History:     NONE          "Family History:         Positive for Coronary Artery Disease ( pat. GM ) and Hypertension ( mother; brother ).      Positive for Asthma ( mother ).      Positive for Cerebrovascular Accident ( pat. GM ).          Social History:     Occupation: Flowers     Marital Status:      Children: None         Tobacco/Alcohol/Supplements:     Last Reviewed on 3/12/2019 01:07 PM by Arik Franco    Tobacco: Current Smoker: He currently smokes every day, 1 pack per day.  \"maybe 6 every 2-3 weeks\"         Substance Abuse History:     Last Reviewed on 3/12/2019 01:07 PM by Arik Franco    NEGATIVE         Mental Health History:     Last Reviewed on 3/12/2019 01:07 PM by Arik Franco        Communicable Diseases (eg STDs):     Last Reviewed on 3/12/2019 01:07 PM by Arik Franco            Current Problems:     Last Reviewed on 3/12/2019 01:07 PM by Arik Franco    Depression with anxiety     Hypercholesterolemia     Allergic rhinitis     Type 2 diabetes     Erectile dysfunction due to organic reasons     Essential hypertension         Immunizations:     Hep A, adult dose 9/21/2018     zzFluzone pf-quadrivalent 3 and up 9/17/2014     Fluzone (3 + years dose) 12/13/2013     Fluzone pf (3+ years dose) 1/19/2016     Fluzone Quadrivalent (3+ years) 9/21/2018     Influenza Virus Vaccine pf (adult) 2/8/2018     PNEUMOVAX 23 (Pneumococcal PPV23) 12/13/2013         Allergies:     Last Reviewed on 3/12/2019 01:07 PM by Arik Franco      No Known Drug Allergies.         Current Medications:     Last Reviewed on 3/12/2019 01:07 PM by Arik Franco    Cyclobenzaprine HCl 10mg Tablet Take 1/2 to 1 tablet every 8 hours as needed for muscle spasm     Lipitor 10mg Tablet Take 1 tablet(s) by mouth daily     Lotrel 5mg/20mg Capsules Take 1 capsule(s) by mouth daily     Metformin HCl 500mg Tablets, Extended Release Take 2 tablet(s) by mouth daily     Jardiance 25mg Tablet Take " 1 tablet(s) by mouth qam     Glucose Reagent Blood Test Strips  Reagent Strips Check blood sugar once daily before breakfast     Aspirin (ASA) 81mg Tablets, Enteric Coated 1 tab daily     Benadryl Allergy 25mg Tablet 2 TAB DAILY     Zyrtec 10mg Tablet 1 tab daily         OBJECTIVE:        Vitals:         Current: 3/12/2019 1:00:49 PM    Ht:  5 ft, 8 in;  Wt: 195.8 lbs;  BMI: 29.8    T: 97.7 F (oral);  BP: 132/75 mm Hg (left arm, sitting);  P: 97 bpm (left arm (BP Cuff), sitting);  sCr: 0.68 mg/dL;  GFR: 135.68        Exams:     PHYSICAL EXAM:     GENERAL: Vitals recorded well developed, well nourished;     NECK: range of motion is normal; thyroid is non-palpable;     RESPIRATORY: normal respiratory rate and pattern with no distress; normal breath sounds with no rales, rhonchi, wheezes or rubs;     CARDIOVASCULAR: normal rate; rhythm is regular;  no systolic murmur;     GASTROINTESTINAL: nontender; normal bowel sounds; no masses;     SKIN:  no significant rashes or lesions; no suspicious moles;     Foot exam performed.      Left foot exam    Protective sensation using Monofilament test: NORMAL sensation. Patient detects .07 grams of force which is considered normal.    Vascular status: normal peripheral vascular exam with palpable dorsal pedal and posterior tibal pulses and brisk digital capillary refill    Skin is intact without sores or ulcers    Right foot exam    Protective sensation using Monofilament test: NORMAL sensation. Patient detects .07 grams of force which is considered normal.    Vascular status: normal peripheral vascular exam with palpable dorsal pedal and posterior tibal pulses and brisk digital capillary refill    Skin is intact without sores or ulcers         ASSESSMENT           250.00   E11.9  Type 2 diabetes              DDx:     272.0   E78.2  Hypercholesterolemia              DDx:     401.1   I10  Essential hypertension              DDx:         ORDERS:         Meds Prescribed:       Refill  of: Cyclobenzaprine HCl 10mg Tablet Take 1/2 to 1 tablet every 8 hours as needed for muscle spasm  #90 (Ninety) tablet(s) Refills: 1       Refill of: Lipitor (Atorvastatin Calcium) 10mg Tablet Take 1 tablet(s) by mouth daily  #90 (Ninety) tablet(s) Refills: 1       Refill of: Lotrel (Amlodipine/Benazepril) 5mg/20mg Capsules Take 1 capsule(s) by mouth daily  #90 (Ninety) capsule(s) Refills: 1       Refill of: Metformin HCl 500mg Tablets, Extended Release Take 2 tablet(s) by mouth daily  #180 (One Lena and Eighty) tablet(s) Refills: 1       Refill of: Jardiance (Empagliflozin) 25mg Tablet Take 1 tablet(s) by mouth qam  #30 (Thirty) tablet(s) Refills: 11         Radiology/Test Orders:       2022F  Dilated retinal eye exam w/interpret by ophthalmologist/optometrist documented/reviewed (DM)4  (In-House)           Other Orders:       2028F  Foot examination performed (includes examination through visual inspection, sensory exam with monofi  (In-House)         4004F  Pt scrnd tobacco use rcvd tobacco cessation talk  (In-House)           Calculated BMI above the upper parameter and a follow-up plan was documented in the medical record  (In-House)                   PLAN:          Type 2 diabetes         RECOMMENDATIONS given include: Today, we have reviewed Ayden rainey.  I have asked him to try to be more diligent regarding habits especially diet.  We will await his labs and then be back in touch with him tomorrow..  MIPS Smoking cessation encouraged. Counseling for less than 3 minutes.      BMI Elevated - Follow-Up Plan: He was provided education on weight loss strategies           Prescriptions:       Refill of: Metformin HCl 500mg Tablets, Extended Release Take 2 tablet(s) by mouth daily  #180 (One Lena and Eighty) tablet(s) Refills: 1       Refill of: Jardiance (Empagliflozin) 25mg Tablet Take 1 tablet(s) by mouth qam  #30 (Thirty) tablet(s) Refills: 11           Orders:       2028F  Foot examination performed  (includes examination through visual inspection, sensory exam with monofi  (In-House)         4004F  Pt scrnd tobacco use rcvd tobacco cessation talk  (In-House)           Calculated BMI above the upper parameter and a follow-up plan was documented in the medical record  (In-House)         2022F  Dilated retinal eye exam w/interpret by ophthalmologist/optometrist documented/reviewed (DM)4  (In-House)             Patient Education Handouts:       Non-Insulin Dependent Diabetes Mellitus (NIDDM)           Hypercholesterolemia As above.           Prescriptions:       Refill of: Lipitor (Atorvastatin Calcium) 10mg Tablet Take 1 tablet(s) by mouth daily  #90 (Ninety) tablet(s) Refills: 1          Essential hypertension As above.           Prescriptions:       Refill of: Lotrel (Amlodipine/Benazepril) 5mg/20mg Capsules Take 1 capsule(s) by mouth daily  #90 (Ninety) capsule(s) Refills: 1             Other Prescriptions:       Refill of: Cyclobenzaprine HCl 10mg Tablet Take 1/2 to 1 tablet every 8 hours as needed for muscle spasm  #90 (Ninety) tablet(s) Refills: 1         CHARGE CAPTURE           **Please note: ICD descriptions below are intended for billing purposes only and may not represent clinical diagnoses**        Primary Diagnosis:         250.00 Type 2 diabetes            E11.9    Type 2 diabetes mellitus without complications              Orders:          18038   Office/outpatient visit; established patient, level 4  (In-House)             2028F   Foot examination performed (includes examination through visual inspection, sensory exam with monofi  (In-House)             4004F   Pt scrnd tobacco use rcvd tobacco cessation talk  (In-House)                Calculated BMI above the upper parameter and a follow-up plan was documented in the medical record  (In-House)             2022F   Dilated retinal eye exam w/interpret by ophthalmologist/optometrist documented/reviewed (DM)4  (In-House)           272.0  Hypercholesterolemia            E78.2    Mixed hyperlipidemia    401.1 Essential hypertension            I10    Essential (primary) hypertension

## 2021-05-18 NOTE — PROGRESS NOTES
Ayden Murillo  1975     Office/Outpatient Visit    Visit Date: Tue, Mar 10, 2020 10:35 am    Provider: Arik Franco MD (Assistant: Mary Sheldon RN)    Location: [facility]        Electronically signed by Arik Franco MD on  03/11/2020 06:37:13 AM                             Subjective:        CC: diabetes, blood pressure, cholesterol    HPI:           Patient presents with type 2 diabetes mellitus without complications.  Current meds include an oral hypoglycemic ( Glucophage XR and Jardiance ).  He reports home blood glucose readings have been a bit high, with average fasting readings in the 150-180 mg/dL range.  Most recent lab results include Hemoglobin A1c:  7.1 (06/28/2019),  7.3 (%) (10/26/2019), LDL:  75 (mg/dL) (03/12/2019).            In regard to the essential (primary) hypertension, his current cardiac medication regimen includes a combination medication ( Lotrel ).  Mr. Murillo does not check his blood pressure other than at his clinic appointments.  He is tolerating the medication well without side effects.  Compliance with treatment has been poor; he runs out of medication and doesn't follow up.            Dx with mixed hyperlipidemia; current treatment includes Lipitor.  Compliance with treatment has been good; he takes his medication as directed and follows up as directed.  He denies experiencing any hypercholesterolemia related symptoms.      ROS:     CONSTITUTIONAL:  Negative for chills and fever.      CARDIOVASCULAR:  Negative for chest pain and palpitations.      RESPIRATORY:  Negative for recent cough and dyspnea.      GASTROINTESTINAL:  Negative for abdominal pain, nausea and vomiting.      INTEGUMENTARY:  Negative for atypical mole(s) and rash.          Past Medical History / Family History / Social History:         Last Reviewed on 3/10/2020 10:47 AM by Arik Franco    Past Medical History:             PAST MEDICAL HISTORY         Asthma     "Type 2 Diabetes    Hyperlipidemia    Hypertension         PREVENTIVE HEALTH MAINTENANCE             EYE EXAM: Diabetic Eye Exam during this calendar year and results are in chart was last done 10/2018         Surgical History:     NONE         Family History:         Positive for Coronary Artery Disease ( pat. GM ) and Hypertension ( mother; brother ).      Positive for Asthma ( mother ).      Positive for Cerebrovascular Accident ( pat. GM ).          Social History:     Occupation: Flowers     Marital Status:      Children: None         Tobacco/Alcohol/Supplements:     Last Reviewed on 3/10/2020 10:47 AM by Arik Franco    Tobacco: Current Smoker: He currently smokes every day, 1 pack per day.  \"maybe 6 every 2-3 weeks\"         Substance Abuse History:     Last Reviewed on 3/10/2020 10:47 AM by Arik Franco    NEGATIVE         Mental Health History:     Last Reviewed on 3/10/2020 10:47 AM by Arik Franco        Communicable Diseases (eg STDs):     Last Reviewed on 3/10/2020 10:47 AM by Arik Franco        Current Problems:     Last Reviewed on 3/10/2020 10:47 AM by Arik Franco    Essential (primary) hypertension    Other male erectile dysfunction    Type 2 diabetes mellitus without complications    Mixed hyperlipidemia    Encounter for immunization        Immunizations:     Hep A, adult dose 9/21/2018    zzFluzone pf-quadrivalent 3 and up 9/17/2014    Fluzone (3 + years dose) 12/13/2013    Fluzone pf (3+ years dose) 1/19/2016    Fluzone Quadrivalent (3+ years) 9/21/2018    Influenza Virus Vaccine pf (adult) 2/8/2018    PNEUMOVAX 23 (Pneumococcal PPV23) 12/13/2013        Allergies:     Last Reviewed on 3/10/2020 10:47 AM by Arik Franco    No Known Allergies.        Current Medications:     Last Reviewed on 3/10/2020 10:47 AM by Arik Franco    Lotrel 5mg/20mg Capsules [Take 1 capsule(s) by mouth daily]    metFORMIN 500 mg oral Tablet, " Extended Release 24 hr [Take 2 tablet(s) by mouth daily]    montelukast 10 mg oral tablet [TAKE 1 TABLET BY MOUTH ONCE DAILY IN THE EVENING]    Blood Glucose Test strips [Check blood sugar once daily before breakfast]    Lipitor 10mg Tablet [Take 1 tablet(s) by mouth daily]    Zyrtec 10 mg oral tablet [1 tab daily]    Benadryl Allergy 25 mg oral tablet [2 TAB DAILY]    aspirin 81 mg oral tablet, delayed release (enteric coated) [1 tab daily]    cyclobenzaprine 10 mg oral tablet [Take 1/2 to 1 tablet every 8 hours as needed for muscle spasm]    Jardiance 25mg Tablet [Take 1 tablet(s) by mouth qam]    Ventolin HFA 90mcg/1actuation Oral Inhaler [Inhale 2 puff(s) by mouth 4 times a day as needed]        Objective:        Vitals:         Current: 3/10/2020 10:39:54 AM    Ht:  5 ft, 8 in;  Wt: 192.8 lbs;  BMI: 29.3T: 98 F (oral);  BP: 133/77 mm Hg (left arm, sitting);  P: 83 bpm (left arm (BP Cuff), sitting);  sCr: 0.65 mg/dL;  GFR: 139.58        Exams:     PHYSICAL EXAM:     GENERAL: Vitals recorded well developed, well nourished;     NECK: range of motion is normal; thyroid is non-palpable;     RESPIRATORY: normal respiratory rate and pattern with no distress; normal breath sounds with no rales, rhonchi, wheezes or rubs;     CARDIOVASCULAR: normal rate; rhythm is regular;  no systolic murmur;     GASTROINTESTINAL: nontender; normal bowel sounds; no masses;     LYMPHATIC: no enlargement of cervical or facial nodes; no supraclavicular nodes;     SKIN:  no significant rashes or lesions; no suspicious moles;     NEUROLOGIC: mental status: alert and oriented x 3; cranial nerves II-XII grossly intact;     PSYCHIATRIC: appropriate affect and demeanor; normal psychomotor function;     Foot exam performed.      Left foot exam    Protective sensation using Monofilament test: NORMAL sensation. Patient detects .07 grams of force which is considered normal.    Vascular status: normal peripheral vascular exam with palpable dorsal  pedal and posterior tibal pulses and brisk digital capillary refill    Skin is intact without sores or ulcers    Right foot exam    Protective sensation using Monofilament test: NORMAL sensation. Patient detects .07 grams of force which is considered normal.    Vascular status: normal peripheral vascular exam with palpable dorsal pedal and posterior tibal pulses and brisk digital capillary refill    Skin is intact without sores or ulcers         Assessment:         E11.9   Type 2 diabetes mellitus without complications       I10   Essential (primary) hypertension       E78.2   Mixed hyperlipidemia       Z23   Encounter for immunization       R53.83   Other fatigue           ORDERS:         Meds Prescribed:       [Refilled] montelukast 10 mg oral tablet [take 1 tablet (10 mg) by oral route once daily in the evening], #90 (ninety) tablets, Refills: 3 (three)         Lab Orders:       25772  DIAB2 - [facility] CMP A1C LIPID AND MICRO ALBUM CR RATIO: [id number],[id number]  (Send-Out)            87928  BDCB2 - [facility] CBC w/o diff  (Send-Out)            28090  CK - [facility]- CK total  (Send-Out)            07659  TSH - [facility] TSH  (Send-Out)            68565  TESTB - [facility] Testosterone, total  (Send-Out)              Procedures Ordered:       18775  Immunization administration; one vaccine  (In-House)              Other Orders:       04790  Influenza virus vaccine, quadrivalent, split virus, preservative free 3 years of age & older  (In-House)            2028F  Foot examination performed (includes examination through visual inspection, sensory exam with monofilament, and pulse exam - report when any of the three components are completed) (DM)4  (In-House)              Depression screen negative  (In-House)                      Plan:         Type 2 diabetes mellitus without complications    LABORATORY:  Labs ordered to be performed today include Diabetes Panel 2;CMP, A1C, Lipid, Microalbumin:Creatinine Ratio.      RECOMMENDATIONS  given include: Today, we have reviewed Ayden' care.  I'm going to recommend no changes in his care.  We will update labs as noted below and consider how to move forward.  I may consider a combination with Jardiance and another medication to save on copays..  MIPS PHQ-9 Depression Screening: Completed form scanned and in chart; Total Score 2; Negative Depression Screen  Smoking cessation encouraged. Counseling for less than 3 minutes.            Orders:       93370  DIAB2 - [facility] CMP A1C LIPID AND MICRO ALBUM CR RATIO: 36969,[id number],[id number],88336  (Send-Out)              Depression screen negative  (In-House)            2028F  Foot examination performed (includes examination through visual inspection, sensory exam with monofilament, and pulse exam - report when any of the three components are completed) (DM)4  (In-House)              Essential (primary) hypertensionAs above.        Mixed hyperlipidemiaAs above.        Encounter for immunization          Immunizations:       86722  Immunization administration; one vaccine  (In-House)            09927  Influenza virus vaccine, quadrivalent, split virus, preservative free 3 years of age & older  (In-House)                Dose (ml): 0.5  Site: left deltoid  Route: intramuscular  Administered by: Mary Sheldon          : [employer]im[employer]  Lot #:   Exp: 06/30/2020          NDC: [address]-55        Other fatigue    LABORATORY:  Labs ordered to be performed today include CBC W/O DIFF, CK, total, Testosterone total, and TSH.            Orders:       67749  BDCB2 - [facility] CBC w/o diff  (Send-Out)            93154  CK - [facility]- CK total  (Send-Out)            54446  TSH - [facility] TSH  (Send-Out)            45809  TESTB - [facility] Testosterone, total  (Send-Out)                  Other Prescriptions:       [Refilled] montelukast 10 mg oral tablet [take 1 tablet (10 mg) by oral route once daily in the evening], #90 (ninety) tablets, Refills: 3 (three)         Charge  Capture:         Primary Diagnosis:     E11.9  Type 2 diabetes mellitus without complications           Orders:      17852  Office/outpatient visit; established patient, level 4  (In-House)              Depression screen negative  (In-House)            2028F  Foot examination performed (includes examination through visual inspection, sensory exam with monofilament, and pulse exam - report when any of the three components are completed) (DM)4  (In-House)              I10  Essential (primary) hypertension     E78.2  Mixed hyperlipidemia     Z23  Encounter for immunization           Orders:      66606  Immunization administration; one vaccine  (In-House)            02739  Influenza virus vaccine, quadrivalent, split virus, preservative free 3 years of age & older  (In-House)              R53.83  Other fatigue

## 2021-05-18 NOTE — PROGRESS NOTES
Ayden Murillo. 1975     Office/Outpatient Visit    Visit Date: Tue, Sep 10, 2019 11:16 am    Provider: Arik Franco MD (Assistant: Mary Sheldno RN)    Location: [facility]        Electronically signed by Arik Franco MD on  09/10/2019 05:47:51 PM                             SUBJECTIVE:        CC: diabetes, blood pressure, cholesterol         HPI:         Mr. Murillo presents with type 2 diabetes.  Current meds include an oral hypoglycemic ( Glucophage XR and Jardiance ).  He reports home blood glucose readings have been fairly good, with average fasting glucoses running in the 120-150 mg/dL range.  Most recent lab results include Hemoglobin A1c:  6.8 (%) (03/12/2019),  7.1 (06/28/2019), LDL:  75 (mg/dL) (03/12/2019).          In regard to the hypercholesterolemia, current treatment includes Lipitor.  Compliance with treatment has been good; he takes his medication as directed and follows up as directed.  He denies experiencing any hypercholesterolemia related symptoms.          Essential hypertension details; his current cardiac medication regimen includes a combination medication ( Lotrel ).  Mr. Murillo does not check his blood pressure other than at his clinic appointments.  He is tolerating the medication well without side effects.  Compliance with treatment has been poor; he runs out of medication and doesn't follow up.      ROS:     CONSTITUTIONAL:  Negative for chills and fever.      CARDIOVASCULAR:  Negative for chest pain and palpitations.      RESPIRATORY:  Positive for recent cough.   Negative for dyspnea.      GASTROINTESTINAL:  Negative for abdominal pain, nausea and vomiting.      INTEGUMENTARY:  Negative for atypical mole(s) and rash.          PMH/FMH/SH:     Last Reviewed on 9/03/2019 02:11 PM by Anika Saravia    Past Medical History:             PAST MEDICAL HISTORY         Asthma    Type 2 Diabetes    Hyperlipidemia    Hypertension         PREVENTIVE HEALTH MAINTENANCE  "            EYE EXAM: Diabetic Eye Exam during this calendar year and results are in chart was last done 10/2018         Surgical History:     NONE         Family History:         Positive for Coronary Artery Disease ( pat. GM ) and Hypertension ( mother; brother ).      Positive for Asthma ( mother ).      Positive for Cerebrovascular Accident ( pat. GM ).          Social History:     Occupation: Flowers     Marital Status:      Children: None         Tobacco/Alcohol/Supplements:     Last Reviewed on 9/03/2019 02:11 PM by Anika Saravia    Tobacco: Current Smoker: He currently smokes every day, 1 pack per day.  \"maybe 6 every 2-3 weeks\"         Substance Abuse History:     Last Reviewed on 9/03/2019 02:11 PM by Anika Saravia    NEGATIVE         Mental Health History:     Last Reviewed on 9/03/2019 02:11 PM by Anika Saravia        Communicable Diseases (eg STDs):     Last Reviewed on 9/03/2019 02:11 PM by Anika Saravia            Current Problems:     Last Reviewed on 9/03/2019 02:11 PM by Anika Saravia    Depression with anxiety     Hypercholesterolemia     Allergic rhinitis     Type 2 diabetes     Erectile dysfunction due to organic reasons     Essential hypertension     Screening for depression     Acute sinusitis, other         Immunizations:     Hep A, adult dose 9/21/2018     zzFluzone pf-quadrivalent 3 and up 9/17/2014     Fluzone (3 + years dose) 12/13/2013     Fluzone pf (3+ years dose) 1/19/2016     Fluzone Quadrivalent (3+ years) 9/21/2018     Influenza Virus Vaccine pf (adult) 2/8/2018     PNEUMOVAX 23 (Pneumococcal PPV23) 12/13/2013         Allergies:     Last Reviewed on 9/03/2019 02:11 PM by Anika Saravia      No Known Drug Allergies.         Current Medications:     Last Reviewed on 9/03/2019 02:11 PM by Anika Saravia    Cyclobenzaprine HCl 10mg Tablet Take 1/2 to 1 tablet every 8 hours as needed for muscle spasm     Jardiance 25mg Tablet Take 1 tablet(s) by " mouth qam     Lipitor 10mg Tablet Take 1 tablet(s) by mouth daily     Lotrel 5mg/20mg Capsules Take 1 capsule(s) by mouth daily     Metformin HCl 500mg Tablets, Extended Release Take 2 tablet(s) by mouth daily     Glucose Reagent Blood Test Strips  Reagent Strips Check blood sugar once daily before breakfast     Augmentin 875mg/125mg Tablet 1 po bid with food     Bromfed-DM 2mg/10mg/30mg per 5ml Syrup 1  to 2  tsp po every 4-6 hrs prn cough/congestion.     Guaifenesin 200mg Tablet take 2 tablets twice daily     Aspirin (ASA) 81mg Tablets, Enteric Coated 1 tab daily     Benadryl Allergy 25mg Tablet 2 TAB DAILY     Zyrtec 10mg Tablet 1 tab daily         OBJECTIVE:        Vitals:         Current: 9/10/2019 11:20:32 AM    Ht:  5 ft, 8 in;  Wt: 192.2 lbs;  BMI: 29.2    T: 97.7 F (oral);  BP: 137/76 mm Hg (left arm, sitting);  P: 74 bpm (left arm (BP Cuff), sitting);  sCr: 0.68 mg/dL;  GFR: 133.25        Exams:     PHYSICAL EXAM:     GENERAL: Vitals recorded well developed, well nourished;     EYES: extraocular movements intact; conjunctiva and cornea are normal; PERRL;     E/N/T: EARS:  normal external auditory canals and tympanic membranes;  grossly normal hearing; OROPHARYNX:  normal mucosa, dentition, gingiva, and posterior pharynx;     NECK: range of motion is normal; thyroid is non-palpable;     RESPIRATORY: normal respiratory rate and pattern with no distress; normal breath sounds with no rales, rhonchi, wheezes or rubs;     CARDIOVASCULAR: normal rate; rhythm is regular;  no systolic murmur;     GASTROINTESTINAL: nontender; normal bowel sounds; no masses;     LYMPHATIC: no enlargement of cervical or facial nodes; no supraclavicular nodes;     SKIN:  no significant rashes or lesions; no suspicious moles;         ASSESSMENT           250.00   E11.9  Type 2 diabetes              DDx:     272.0   E78.2  Hypercholesterolemia              DDx:     401.1   I10  Essential hypertension              DDx:     719.46    M25.562  Knee pain              DDx:         ORDERS:         Meds Prescribed:       Refill of: Lipitor (Atorvastatin Calcium) 10mg Tablet Take 1 tablet(s) by mouth daily  #90 (Ninety) tablet(s) Refills: 1       Refill of: Lotrel (Amlodipine/Benazepril) 5mg/20mg Capsules Take 1 capsule(s) by mouth daily  #90 (Ninety) capsule(s) Refills: 1       Refill of: Metformin HCl 500mg Tablets, Extended Release Take 2 tablet(s) by mouth daily  #180 (One Spencer and Eighty) tablet(s) Refills: 1       Ventolin HFA (Albuterol) 90mcg/1actuation Oral Inhaler Inhale 2 puff(s) by mouth 4 times a day as needed  #1 (One) inhaler(s) Refills: 2       Singulair  (Montelukast Sodium) 10mg Tablet Take 1 tablet(s) by mouth each evening  #30 (Thirty) tablet(s) Refills: 2         Radiology/Test Orders:       2022F  Dilated retinal eye exam w/interpret by ophthalmologist/optometrist documented/reviewed (DM)4  (In-House)           Lab Orders:       90466  COMP [facility] Comp. Metabolic Panel  (Send-Out)         35251  A1CEG - [facility] Hemoglobin A1C  (Send-Out)         73954  SED - [facility] Sedimentation rate, non-automated ESR  (Send-Out)         48125  URIC - [facility] Uric Acid, Serum  (Send-Out)                   PLAN:          Type 2 diabetes     Today, we will refill Ayden medications as noted below.  No changes are anticipated.  We will update labs at the end of this month.      LABORATORY:  Labs ordered to be performed today include Comprehensive metabolic panel and HgbA1C.  MIPS Smoking cessation encouraged. Counseling for less than 3 minutes.            Prescriptions:       Refill of: Metformin HCl 500mg Tablets, Extended Release Take 2 tablet(s) by mouth daily  #180 (One Spencer and Eighty) tablet(s) Refills: 1           Orders:       42043  COMP - [facility] Comp. Metabolic Panel  (Send-Out)         41940  A1CEG - [facility] Hemoglobin A1C  (Send-Out)         2022F  Dilated retinal eye exam w/interpret by ophthalmologist/optometrist documented/reviewed (DM)4   (In-House)             Patient Education Handouts:       Non-Insulin Dependent Diabetes Mellitus (NIDDM)           Hypercholesterolemia As above.           Prescriptions:       Refill of: Lipitor (Atorvastatin Calcium) 10mg Tablet Take 1 tablet(s) by mouth daily  #90 (Ninety) tablet(s) Refills: 1          Essential hypertension As above.           Prescriptions:       Refill of: Lotrel (Amlodipine/Benazepril) 5mg/20mg Capsules Take 1 capsule(s) by mouth daily  #90 (Ninety) capsule(s) Refills: 1          Knee pain     LABORATORY:  Labs ordered to be performed today include ESR and uric acid.            Orders:       47751  SED - [facility] Sedimentation rate, non-automated ESR  (Send-Out)         91417  URIC - [facility] Uric Acid, Serum  (Send-Out)               Other Prescriptions:       Ventolin HFA (Albuterol) 90mcg/1actuation Oral Inhaler Inhale 2 puff(s) by mouth 4 times a day as needed  #1 (One) inhaler(s) Refills: 2       Singulair  (Montelukast Sodium) 10mg Tablet Take 1 tablet(s) by mouth each evening  #30 (Thirty) tablet(s) Refills: 2         CHARGE CAPTURE           **Please note: ICD descriptions below are intended for billing purposes only and may not represent clinical diagnoses**        Primary Diagnosis:         250.00 Type 2 diabetes            E11.9    Type 2 diabetes mellitus without complications              Orders:          10315   Office/outpatient visit; established patient, level 4  (In-House)             2022F   Dilated retinal eye exam w/interpret by ophthalmologist/optometrist documented/reviewed (DM)4  (In-House)           272.0 Hypercholesterolemia            E78.2    Mixed hyperlipidemia    401.1 Essential hypertension            I10    Essential (primary) hypertension    719.46 Knee pain            M25.562    Pain in left knee

## 2021-05-18 NOTE — PROGRESS NOTES
Ayden Murillo 1975     Office/Outpatient Visit    Visit Date: Thu, Feb 8, 2018 02:41 pm    Provider: Arik Franco MD (Assistant: Makenzie Waters MA)    Location: [facility]        Electronically signed by Arik Franco MD on  02/09/2018 07:35:04 AM                             SUBJECTIVE:        CC: diabetes, blood pressure, cholesterol         HPI:         Concerning type 2 diabetes, current meds include an oral hypoglycemic ( Glucophage XR ).  He reports home blood glucose readings have been high, with average fasting readings in the mid-200s mg/dL range.  Most recent lab results include Hemoglobin A1c:  6.8 (10/03/2017),  10.1 (%) (01/30/2018), LDL:  89 (mg/dL) (01/30/2018).  In regard to preventative care, he performs foot self-exams several days per week and his last ophthalmology exam was in a couple of years.          With regard to the hypercholesterolemia, current treatment includes Lipitor.  Compliance with treatment has been good; he takes his medication as directed and follows up as directed.  He denies experiencing any hypercholesterolemia related symptoms.          In regard to the essential hypertension, his current cardiac medication regimen includes a combination medication ( Lotrel ).  He did not bring his blood pressure diary, but says that pressures have been too high.  He is tolerating the medication well without side effects.  Compliance with treatment has been good; he takes his medication as directed and follows up as directed.      ROS:     CONSTITUTIONAL:  Negative for chills and fever.      CARDIOVASCULAR:  Negative for chest pain and palpitations.      RESPIRATORY:  Negative for recent cough and dyspnea.      GASTROINTESTINAL:  Negative for abdominal pain, nausea and vomiting.      INTEGUMENTARY:  Negative for atypical mole(s) and rash.          PMH/FMH/SH:     Last Reviewed on 2/08/2018 02:55 PM by Arik Franco    Past Medical History:         Asthma     "Hypertension         Surgical History:     NONE         Family History:         Positive for Coronary Artery Disease ( pat. GM ) and Hypertension ( mother; brother ).      Positive for Asthma ( mother ).      Positive for Cerebrovascular Accident ( pat. GM ).          Social History:     Occupation: Flowers     Marital Status:      Children: None         Tobacco/Alcohol/Supplements:     Last Reviewed on 2/08/2018 02:55 PM by Arik Franco    Tobacco: Currently smokes 1/2 to 1 pack per day.          Alcohol: \"maybe 6 every 2-3 weeks\"         Substance Abuse History:     Last Reviewed on 2/08/2018 02:55 PM by Arik Franco    NEGATIVE         Mental Health History:     Last Reviewed on 2/08/2018 02:55 PM by Arik Franco        Communicable Diseases (eg STDs):     Last Reviewed on 2/08/2018 02:55 PM by Arik Franco            Current Problems:     Last Reviewed on 2/08/2018 02:55 PM by Arik Franco    Depression with anxiety     Hypercholesterolemia     Allergic rhinitis     Type 2 diabetes     Erectile dysfunction due to organic reasons     Essential hypertension         Immunizations:     Fluzone pf-quadrivalent 3 and up 9/17/2014     Fluzone (3 + years dose) 12/13/2013     Fluzone pf (3+ years dose) 1/19/2016     Influenza Virus Vaccine pf (adult) 2/8/2018     PNEUMOVAX 23 (Pneumococcal PPV23) 12/13/2013         Allergies:     Last Reviewed on 2/08/2018 02:55 PM by Arik Franco      No Known Drug Allergies.         Current Medications:     Last Reviewed on 2/08/2018 02:55 PM by Arik Franco    Metformin HCl 500mg Tablets, Extended Release Take 2 tablet(s) by mouth daily     Lipitor 10mg Tablet Take 1 tablet(s) by mouth daily     Lotrel 5mg/20mg Capsules Take 1 capsule(s) by mouth daily     Cyclobenzaprine HCl 10mg Tablet Take 1/2 to 1 tablet every 8 hours as needed for muscle spasm     Glucose Reagent Blood Test Strips  Reagent Strips Check " blood sugar once daily before breakfast     Aspirin (ASA) 81mg Tablets, Enteric Coated 1 tab daily     Benadryl Allergy 25mg Tablet 2 TAB DAILY     Zyrtec 10mg Tablet 1 tab daily         OBJECTIVE:        Vitals:         Current: 2/8/2018 2:43:16 PM    Ht:  5 ft, 8 in;  Wt: 199.8 lbs;  BMI: 30.4    T: 98.2 F (oral);  BP: 146/90 mm Hg (left arm, sitting);  P: 97 bpm (left arm (BP Cuff), sitting);  sCr: 0.71 mg/dL;  GFR: 132.40        Exams:     PHYSICAL EXAM:     GENERAL: Vitals recorded well developed, well nourished;     EYES: extraocular movements intact; conjunctiva and cornea are normal; PERRL;     E/N/T: EARS:  normal external auditory canals and tympanic membranes;  grossly normal hearing; OROPHARYNX:  normal mucosa, dentition, gingiva, and posterior pharynx;     NECK: range of motion is normal; thyroid is non-palpable;     RESPIRATORY: normal respiratory rate and pattern with no distress; normal breath sounds with no rales, rhonchi, wheezes or rubs;     CARDIOVASCULAR: normal rate; rhythm is regular;  no systolic murmur;     GASTROINTESTINAL: nontender; normal bowel sounds; no masses;     SKIN:  no significant rashes or lesions; no suspicious moles;     Foot exam performed.      Left foot exam    Protective sensation using Monofilament test: NORMAL sensation. Patient detects .07 grams of force which is considered normal.    Vascular status: normal peripheral vascular exam with palpable dorsal pedal and posterior tibal pulses and brisk digital capillary refill    Skin is intact without sores or ulcers    Right foot exam    Protective sensation using Monofilament test: NORMAL sensation. Patient detects .07 grams of force which is considered normal.    Vascular status: normal peripheral vascular exam with palpable dorsal pedal and posterior tibal pulses and brisk digital capillary refill    Skin is intact without sores or ulcers         Procedures:     Influenza vaccination     1. Influenza, seasonal PF  (children 3 years to adult): 0.5 ml unit dose given IM in the left upper arm; administered by mlb;  lot number [id number]; expires June 30, 2018; Information sheet given Regarding contraindications to an Influenza vaccine: Denies moderate/severe illness with/without fever; serious reaction to eggs, egg proteins, gentamicin, gelatin, arginine, neomycin or polymixin; serious reaction after recieving previous influenza vaccines; and history of Guillain-Sherrill Syndrome.              ASSESSMENT           250.00   E11.9  Type 2 diabetes              DDx:     272.0   E78.4  Hypercholesterolemia              DDx:     401.1   I10  Essential hypertension              DDx:     V04.81   Z23  Influenza vaccination              DDx:         ORDERS:         Meds Prescribed:       Refill of: Metformin HCl 500mg Tablets, Extended Release Take 2 tablet(s) by mouth daily  #180 (One [place] and Eighty) tablet(s) Refills: 1       Refill of: Lipitor (Atorvastatin Calcium) 10mg Tablet Take 1 tablet(s) by mouth daily  #90 (Ninety) tablet(s) Refills: 1       Refill of: Lotrel (Amlodipine/Benazepril) 5mg/20mg Capsules Take 1 capsule(s) by mouth daily  #90 (Ninety) capsule(s) Refills: 1         Procedures Ordered:       59231  Immunization administration; one vaccine  (In-House)           Other Orders:       55226  Influenza virus vaccine, quadrivalent, split virus, preservative free 3 years of age & older  (In-House)         2028F  Foot examination performed (includes examination through visual inspection, sensory exam with monofi  (In-House)                   PLAN:          Type 2 diabetes         RECOMMENDATIONS given include: Today, we have reviewed his care.  His sugar is much higher than it was on the last check.  I'm going to recommend trial again of Jardiance.  We will start him on some samples of this and then go from there.  He is very concerned about the potential costs of medications.  I have encouraged him to stop smoking and to  decrease his intake of alcohol.  No other change for now.  Risks of uncontrolled diabetes are discussed including vision loss, kidney damage, neuropathy, and heart disease..            Prescriptions:       Refill of: Metformin HCl 500mg Tablets, Extended Release Take 2 tablet(s) by mouth daily  #180 (One [place] and Eighty) tablet(s) Refills: 1           Orders:       2028F  Foot examination performed (includes examination through visual inspection, sensory exam with monofi  (In-House)            Hypercholesterolemia As above.           Prescriptions:       Refill of: Lipitor (Atorvastatin Calcium) 10mg Tablet Take 1 tablet(s) by mouth daily  #90 (Ninety) tablet(s) Refills: 1          Essential hypertension As above.           Prescriptions:       Refill of: Lotrel (Amlodipine/Benazepril) 5mg/20mg Capsules Take 1 capsule(s) by mouth daily  #90 (Ninety) capsule(s) Refills: 1          Influenza vaccination           Orders:       35336  Immunization administration; one vaccine  (In-House)         59813  Influenza virus vaccine, quadrivalent, split virus, preservative free 3 years of age & older  (In-House)               CHARGE CAPTURE           **Please note: ICD descriptions below are intended for billing purposes only and may not represent clinical diagnoses**        Primary Diagnosis:         250.00 Type 2 diabetes            E11.9    Type 2 diabetes mellitus without complications              Orders:          31378   Office/outpatient visit; established patient, level 4  (In-House)             2028F   Foot examination performed (includes examination through visual inspection, sensory exam with monofi  (In-House)           272.0 Hypercholesterolemia            E78.4    Other hyperlipidemia    401.1 Essential hypertension            I10    Essential (primary) hypertension    V04.81 Influenza vaccination            Z23    Encounter for immunization              Orders:          58728   Immunization administration;  one vaccine  (In-House)             15037   Influenza virus vaccine, quadrivalent, split virus, preservative free 3 years of age & older  (In-House)

## 2021-05-27 ENCOUNTER — HOSPITAL ENCOUNTER (OUTPATIENT)
Dept: OTHER | Facility: HOSPITAL | Age: 46
Discharge: HOME | End: 2021-05-27
Attending: FAMILY MEDICINE

## 2021-05-27 LAB
ALBUMIN SERPL-MCNC: 4.4 G/DL (ref 3.5–5)
ALBUMIN/GLOB SERPL: 1.5 {RATIO} (ref 1.4–2.6)
ALP SERPL-CCNC: 85 U/L (ref 53–128)
ALT SERPL-CCNC: 26 U/L (ref 10–40)
ANION GAP SERPL CALC-SCNC: 15 MMOL/L (ref 8–19)
AST SERPL-CCNC: 19 U/L (ref 15–50)
BILIRUB SERPL-MCNC: 0.66 MG/DL (ref 0.2–1.3)
BUN SERPL-MCNC: 15 MG/DL (ref 5–25)
BUN/CREAT SERPL: 21 {RATIO} (ref 6–20)
CALCIUM SERPL-MCNC: 9.2 MG/DL (ref 8.7–10.4)
CHLORIDE SERPL-SCNC: 98 MMOL/L (ref 99–111)
CHOLEST SERPL-MCNC: 147 MG/DL (ref 107–200)
CHOLEST/HDLC SERPL: 3.4 {RATIO} (ref 3–6)
CONV CO2: 27 MMOL/L (ref 22–32)
CONV CREATININE URINE, RANDOM: 41.1 MG/DL (ref 10–300)
CONV MICROALBUM.,U,RANDOM: <12 MG/L (ref 0–20)
CONV TOTAL PROTEIN: 7.3 G/DL (ref 6.3–8.2)
CREAT UR-MCNC: 0.73 MG/DL (ref 0.7–1.2)
EST. AVERAGE GLUCOSE BLD GHB EST-MCNC: 151 MG/DL
GFR SERPLBLD BASED ON 1.73 SQ M-ARVRAT: >60 ML/MIN/{1.73_M2}
GLOBULIN UR ELPH-MCNC: 2.9 G/DL (ref 2–3.5)
GLUCOSE SERPL-MCNC: 130 MG/DL (ref 70–99)
HBA1C MFR BLD: 6.9 % (ref 3.5–5.7)
HDLC SERPL-MCNC: 43 MG/DL (ref 40–60)
LDLC SERPL CALC-MCNC: 87 MG/DL (ref 70–100)
MICROALBUMIN/CREAT UR: 29.2 MG/G{CRE} (ref 0–25)
OSMOLALITY SERPL CALC.SUM OF ELEC: 283 MOSM/KG (ref 273–304)
POTASSIUM SERPL-SCNC: 4.5 MMOL/L (ref 3.5–5.3)
SODIUM SERPL-SCNC: 135 MMOL/L (ref 135–147)
TRIGL SERPL-MCNC: 84 MG/DL (ref 40–150)
TSH SERPL-ACNC: 1.87 M[IU]/L (ref 0.27–4.2)
VLDLC SERPL-MCNC: 17 MG/DL (ref 5–37)

## 2021-06-01 ENCOUNTER — OFFICE VISIT CONVERTED (OUTPATIENT)
Dept: FAMILY MEDICINE CLINIC | Age: 46
End: 2021-06-01
Attending: FAMILY MEDICINE

## 2021-06-05 NOTE — PROGRESS NOTES
Ayden Murillo  1975     Office/Outpatient Visit    Visit Date: Tue, Jun 1, 2021 09:12 am    Provider: Arik Franco MD (Assistant: Mary Sheldon RN)    Location: [facility]        Electronically signed by Arik Franco MD on  06/02/2021 02:04:30 PM                             Subjective:        CC: diabetes, blood pressure, cholesterol, a fib    HPI:           Mr. Murillo presents with type 2 diabetes mellitus without complications.  Current meds include an oral hypoglycemic ( Glucophage XR ) and Glyxambi.  He reports home blood glucose readings have been fairly good, with average fasting glucoses running in the 120-150 mg/dL range.  Most recent lab results include glycohemoglobin 6.9%.            Dx with mixed hyperlipidemia; current treatment includes Lipitor.  Compliance with treatment has been good; he takes his medication as directed and follows up as directed.  He denies experiencing any hypercholesterolemia related symptoms.            Concerning essential (primary) hypertension, his current cardiac medication regimen includes a beta-blocker ( Toprol-XL ) and a combination medication ( Lotrel ).  Mr. Murillo does not check his blood pressure other than at his clinic appointments.  He is tolerating the medication well without side effects.  Compliance with treatment has been good; he takes his medication as directed and follows up as directed.        Ayden has noted some issue with maintaining erection.  He may also be having a curving of the penis.    ROS:     CONSTITUTIONAL:  Negative for chills and fever.      CARDIOVASCULAR:  Negative for chest pain and palpitations.      RESPIRATORY:  Negative for recent cough and dyspnea.      GASTROINTESTINAL:  Negative for abdominal pain, nausea and vomiting.      INTEGUMENTARY:  Negative for atypical mole(s) and rash.          Past Medical History / Family History / Social History:         Last Reviewed on 6/01/2021 09:31 AM by  "Arik Franco    Past Medical History:             PAST MEDICAL HISTORY         Asthma    Type 2 Diabetes    Hyperlipidemia    Hypertension         PREVENTIVE HEALTH MAINTENANCE             EYE EXAM: Diabetic Eye Exam during this calendar year and results are in chart was last done 10/2018         Surgical History:     NONE         Family History:         Positive for Coronary Artery Disease ( pat. GM ) and Hypertension ( mother; brother ).      Positive for Asthma ( mother ).      Positive for Cerebrovascular Accident ( pat. GM ).          Social History:     Occupation: Flowers     Marital Status:      Children: None         Tobacco/Alcohol/Supplements:     Last Reviewed on 6/01/2021 09:31 AM by Arik Franco    Tobacco: Current Smoker: He currently smokes every day, 1 pack per day.  \"maybe 6 every 2-3 weeks\"         Substance Abuse History:     Last Reviewed on 6/01/2021 09:31 AM by Arik Franco    NEGATIVE         Mental Health History:     Last Reviewed on 6/01/2021 09:31 AM by Arik Franco        Communicable Diseases (eg STDs):     Last Reviewed on 6/01/2021 09:31 AM by Arik Franco        Current Problems:     Last Reviewed on 6/01/2021 09:31 AM by Arik Franco    Essential (primary) hypertension    Other male erectile dysfunction    Type 2 diabetes mellitus without complications    Mixed hyperlipidemia    Other fatigue    Encounter for immunization    Cough    Unspecified atrial fibrillation    Male erectile dysfunction, unspecified        Immunizations:     influenza, injectable, quadrivalent, preservative free (FLUARIX QUAD 2339-5118) 3/10/2020    influenza, injectable, quadrivalent, preservative free (FLUZONE QUAD 6191-7229) 9/18/2020    Hep A, adult dose 9/21/2018    zzFluzone pf-quadrivalent 3 and up 9/17/2014    Fluzone (3 + years dose) 12/13/2013    Fluzone pf (3+ years dose) 1/19/2016    Fluzone Quadrivalent (3+ years) 9/21/2018    " Influenza Virus Vaccine pf (adult) 2/8/2018    PNEUMOVAX 23 (Pneumococcal PPV23) 12/13/2013        Allergies:     Last Reviewed on 6/01/2021 09:31 AM by Arik Franco    No Known Allergies.        Current Medications:     Last Reviewed on 6/01/2021 09:31 AM by Arik Franco    Blood Glucose Test strips  [Check blood sugar once daily before breakfast]    atorvastatin 10 mg oral tablet [Take 1 tablet by mouth once daily]    Zyrtec 10 mg oral tablet [1 tab daily]    Benadryl Allergy 25 mg oral tablet [2 TAB DAILY]    aspirin 81 mg oral tablet, delayed release (enteric coated) [1 tab daily]    cyclobenzaprine 10 mg oral tablet [Take 1/2 to 1 tablet every 8 hours as needed for muscle spasm]    Ventolin HFA 90mcg/1actuation Oral Inhaler [Inhale 2 puff(s) by mouth 4 times a day as needed]    Glyxambi 25-5 mg oral tablet [take 1 tablet by oral route once daily in the morning]    metFORMIN 500 mg oral tablet [TAKE 1 TABLET BY MOUTH TWICE DAILY WITH MORNING MEAL AND WITH EVENING MEAL]    metoprolol succinate 25 mg oral Tablet, Extended Release 24 hr [take 1/2 tab by oral route once daily]    aspirin 325 mg oral tablet, delayed release (enteric coated) [take 1 tablet (325 mg) by oral route once daily]    BENAZEPRIL   TAB 40MG  [once daily]    sotaloL 80 mg oral tablet [take 1.5 tablets (120 mg) by oral route 2 times per day]    amLODIPine 5 mg oral tablet [take 1 tablet (5 mg) by oral route once daily]        Objective:        Vitals:         Current: 6/1/2021 9:16:56 AM    Ht:  5 ft, 8 in;  Wt: 182.6 lbs;  BMI: 27.8T: 97.8 F (temporal);  BP: 124/89 mm Hg (right arm, sitting);  P: 80 bpm (right arm (BP Cuff), sitting);  sCr: 0.73 mg/dL;  GFR: 118.96        Exams:     PHYSICAL EXAM:     GENERAL: Vitals recorded well developed, well nourished;     EYES: extraocular movements intact; conjunctiva and cornea are normal; PERRL;     NECK: range of motion is normal; thyroid is non-palpable;     RESPIRATORY: normal  respiratory rate and pattern with no distress; normal breath sounds with no rales, rhonchi, wheezes or rubs;     CARDIOVASCULAR: normal rate; rhythm is regular;  no systolic murmur;     GASTROINTESTINAL: nontender; normal bowel sounds; no masses;     GENITOURINARY: there is no focal finding of the penis on limited exam;     LYMPHATIC: no enlargement of cervical or facial nodes; no supraclavicular nodes;     SKIN:  no significant rashes or lesions; no suspicious moles;     NEUROLOGIC: mental status: alert and oriented x 3; cranial nerves II-XII grossly intact;     PSYCHIATRIC: appropriate affect and demeanor; normal psychomotor function;     Foot exam performed.  Declines having a chaperone present during exam.      Left foot exam    Protective sensation using Monofilament test: NORMAL sensation. Patient detects .07 grams of force which is considered normal.    Vascular status: normal peripheral vascular exam with palpable dorsal pedal and posterior tibal pulses and brisk digital capillary refill    Skin is intact without sores or ulcers    Right foot exam    Protective sensation using Monofilament test: NORMAL sensation. Patient detects .07 grams of force which is considered normal.    Vascular status: normal peripheral vascular exam with palpable dorsal pedal and posterior tibal pulses and brisk digital capillary refill    Skin is intact without sores or ulcers         Assessment:         E11.9   Type 2 diabetes mellitus without complications       E78.2   Mixed hyperlipidemia       I10   Essential (primary) hypertension       N52.9   Male erectile dysfunction, unspecified           ORDERS:         Meds Prescribed:       [Recorded] Eliquis 5 mg oral tablet [take 1 tablet (5 mg) by oral route 2 times per day]       [Refilled] Glyxambi 25-5 mg oral tablet [take 1 tablet by oral route once daily in the morning], #90 (ninety) tablets, Refills: 1 (one)       [Refilled] metFORMIN 500 mg oral tablet [TAKE 1 TABLET BY  MOUTH TWICE DAILY WITH MORNING MEAL AND WITH EVENING MEAL], #180 (one hundred and eighty) tablets, Refills: 1 (one)       [Refilled] atorvastatin 10 mg oral tablet [Take 1 tablet by mouth once daily], #90 (ninety) tablets, Refills: 1 (one)       [New Rx] sildenafiL 100 mg oral tablet [take 1/2 to 1 tablet by oral route once daily as needed approximately 1 hour before sexual activity], #30 (thirty) tablets, Refills: 2 (two)         Other Orders:       2027F  Foot examination performed (includes examination through visual inspection, sensory exam with monofilament, and pulse exam - report when any of the three components are completed) (DM)4  (In-House)                      Plan:         Type 2 diabetes mellitus without complications        RECOMMENDATIONS given include: Ayden care is stable today.  Recent labs reviewed.  We have updated medication list.  No changes in meds from our standpoint.  I did consider whether to adjust diabetes medications, but that is not necessary at this time.  We will add medication for ED and see how he does.  Consider urology referral..            Prescriptions:       [Recorded] Eliquis 5 mg oral tablet [take 1 tablet (5 mg) by oral route 2 times per day]       [Refilled] Glyxambi 25-5 mg oral tablet [take 1 tablet by oral route once daily in the morning], #90 (ninety) tablets, Refills: 1 (one)       [Refilled] metFORMIN 500 mg oral tablet [TAKE 1 TABLET BY MOUTH TWICE DAILY WITH MORNING MEAL AND WITH EVENING MEAL], #180 (one hundred and eighty) tablets, Refills: 1 (one)       [Refilled] atorvastatin 10 mg oral tablet [Take 1 tablet by mouth once daily], #90 (ninety) tablets, Refills: 1 (one)       [New Rx] sildenafiL 100 mg oral tablet [take 1/2 to 1 tablet by oral route once daily as needed approximately 1 hour before sexual activity], #30 (thirty) tablets, Refills: 2 (two) - headache, backache, hearing/vision change, priapism          Orders:       2028F  Foot examination performed  (includes examination through visual inspection, sensory exam with monofilament, and pulse exam - report when any of the three components are completed) (DM)4  (In-House)              Mixed hyperlipidemiaAs above.        Essential (primary) hypertensionAs above.        Male erectile dysfunction, unspecifiedAs above.            Charge Capture:         Primary Diagnosis:     E11.9  Type 2 diabetes mellitus without complications           Orders:      55807  Office/outpatient visit; established patient, level 4  (In-House)            2028F  Foot examination performed (includes examination through visual inspection, sensory exam with monofilament, and pulse exam - report when any of the three components are completed) (DM)4  (In-House)              E78.2  Mixed hyperlipidemia     I10  Essential (primary) hypertension     N52.9  Male erectile dysfunction, unspecified

## 2021-07-01 VITALS
TEMPERATURE: 98.1 F | SYSTOLIC BLOOD PRESSURE: 136 MMHG | DIASTOLIC BLOOD PRESSURE: 88 MMHG | HEIGHT: 68 IN | WEIGHT: 192 LBS | HEART RATE: 70 BPM | BODY MASS INDEX: 29.1 KG/M2

## 2021-07-01 VITALS
HEIGHT: 68 IN | TEMPERATURE: 97.7 F | HEART RATE: 97 BPM | SYSTOLIC BLOOD PRESSURE: 132 MMHG | WEIGHT: 195.8 LBS | BODY MASS INDEX: 29.67 KG/M2 | DIASTOLIC BLOOD PRESSURE: 75 MMHG

## 2021-07-01 VITALS
SYSTOLIC BLOOD PRESSURE: 137 MMHG | BODY MASS INDEX: 29.13 KG/M2 | HEIGHT: 68 IN | HEART RATE: 74 BPM | TEMPERATURE: 97.7 F | WEIGHT: 192.2 LBS | DIASTOLIC BLOOD PRESSURE: 76 MMHG

## 2021-07-01 VITALS
BODY MASS INDEX: 28.58 KG/M2 | WEIGHT: 188.6 LBS | TEMPERATURE: 98.4 F | HEART RATE: 64 BPM | HEIGHT: 68 IN | DIASTOLIC BLOOD PRESSURE: 84 MMHG | SYSTOLIC BLOOD PRESSURE: 151 MMHG

## 2021-07-01 VITALS
HEART RATE: 97 BPM | SYSTOLIC BLOOD PRESSURE: 146 MMHG | WEIGHT: 199.8 LBS | HEIGHT: 68 IN | DIASTOLIC BLOOD PRESSURE: 90 MMHG | BODY MASS INDEX: 30.28 KG/M2 | TEMPERATURE: 98.2 F

## 2021-07-02 VITALS
SYSTOLIC BLOOD PRESSURE: 133 MMHG | HEIGHT: 68 IN | DIASTOLIC BLOOD PRESSURE: 77 MMHG | HEART RATE: 83 BPM | WEIGHT: 192.8 LBS | BODY MASS INDEX: 29.22 KG/M2 | TEMPERATURE: 98 F

## 2021-07-02 VITALS
DIASTOLIC BLOOD PRESSURE: 80 MMHG | BODY MASS INDEX: 30.16 KG/M2 | HEART RATE: 106 BPM | HEIGHT: 68 IN | TEMPERATURE: 98.6 F | WEIGHT: 199 LBS | SYSTOLIC BLOOD PRESSURE: 123 MMHG

## 2021-07-02 VITALS
DIASTOLIC BLOOD PRESSURE: 90 MMHG | WEIGHT: 192.8 LBS | BODY MASS INDEX: 29.22 KG/M2 | HEART RATE: 101 BPM | TEMPERATURE: 98.4 F | HEIGHT: 68 IN | SYSTOLIC BLOOD PRESSURE: 147 MMHG

## 2021-07-02 VITALS
SYSTOLIC BLOOD PRESSURE: 124 MMHG | BODY MASS INDEX: 27.68 KG/M2 | DIASTOLIC BLOOD PRESSURE: 89 MMHG | HEIGHT: 68 IN | TEMPERATURE: 97.8 F | WEIGHT: 182.6 LBS | HEART RATE: 80 BPM

## 2021-07-02 VITALS
SYSTOLIC BLOOD PRESSURE: 119 MMHG | HEIGHT: 68 IN | HEART RATE: 78 BPM | WEIGHT: 185.8 LBS | DIASTOLIC BLOOD PRESSURE: 93 MMHG | TEMPERATURE: 97.1 F | BODY MASS INDEX: 28.16 KG/M2

## 2021-12-07 NOTE — TELEPHONE ENCOUNTER
I did send a single refill.  However, I recommend seeing patients with diabetes twice yearly.  Please schedule.  Thanks.

## 2022-01-04 ENCOUNTER — LAB (OUTPATIENT)
Dept: LAB | Facility: HOSPITAL | Age: 47
End: 2022-01-04

## 2022-01-04 ENCOUNTER — OFFICE VISIT (OUTPATIENT)
Dept: FAMILY MEDICINE CLINIC | Age: 47
End: 2022-01-04

## 2022-01-04 VITALS
HEART RATE: 98 BPM | DIASTOLIC BLOOD PRESSURE: 73 MMHG | BODY MASS INDEX: 29.49 KG/M2 | TEMPERATURE: 97.8 F | SYSTOLIC BLOOD PRESSURE: 110 MMHG | WEIGHT: 194.6 LBS | HEIGHT: 68 IN

## 2022-01-04 DIAGNOSIS — M25.572 CHRONIC PAIN OF LEFT ANKLE: ICD-10-CM

## 2022-01-04 DIAGNOSIS — G89.29 CHRONIC PAIN OF LEFT ANKLE: ICD-10-CM

## 2022-01-04 DIAGNOSIS — E11.9 TYPE 2 DIABETES MELLITUS WITHOUT COMPLICATION, WITHOUT LONG-TERM CURRENT USE OF INSULIN: Primary | ICD-10-CM

## 2022-01-04 DIAGNOSIS — I48.11 LONGSTANDING PERSISTENT ATRIAL FIBRILLATION: ICD-10-CM

## 2022-01-04 DIAGNOSIS — Z11.59 NEED FOR HEPATITIS C SCREENING TEST: ICD-10-CM

## 2022-01-04 DIAGNOSIS — Z23 ENCOUNTER FOR IMMUNIZATION: ICD-10-CM

## 2022-01-04 DIAGNOSIS — E78.2 MIXED HYPERLIPIDEMIA: ICD-10-CM

## 2022-01-04 DIAGNOSIS — E11.9 TYPE 2 DIABETES MELLITUS WITHOUT COMPLICATION, WITHOUT LONG-TERM CURRENT USE OF INSULIN: ICD-10-CM

## 2022-01-04 LAB
ALBUMIN SERPL-MCNC: 4.7 G/DL (ref 3.5–5.2)
ALBUMIN/GLOB SERPL: 1.9 G/DL
ALP SERPL-CCNC: 91 U/L (ref 39–117)
ALT SERPL W P-5'-P-CCNC: 26 U/L (ref 1–41)
ANION GAP SERPL CALCULATED.3IONS-SCNC: 8.2 MMOL/L (ref 5–15)
AST SERPL-CCNC: 20 U/L (ref 1–40)
BILIRUB SERPL-MCNC: 0.5 MG/DL (ref 0–1.2)
BUN SERPL-MCNC: 13 MG/DL (ref 6–20)
BUN/CREAT SERPL: 18.8 (ref 7–25)
CALCIUM SPEC-SCNC: 9.5 MG/DL (ref 8.6–10.5)
CHLORIDE SERPL-SCNC: 103 MMOL/L (ref 98–107)
CO2 SERPL-SCNC: 24.8 MMOL/L (ref 22–29)
CREAT SERPL-MCNC: 0.69 MG/DL (ref 0.76–1.27)
DEPRECATED RDW RBC AUTO: 41.6 FL (ref 37–54)
ERYTHROCYTE [DISTWIDTH] IN BLOOD BY AUTOMATED COUNT: 11.9 % (ref 12.3–15.4)
GFR SERPL CREATININE-BSD FRML MDRD: 123 ML/MIN/1.73
GLOBULIN UR ELPH-MCNC: 2.5 GM/DL
GLUCOSE SERPL-MCNC: 126 MG/DL (ref 65–99)
HBA1C MFR BLD: 8 % (ref 4.8–5.6)
HCT VFR BLD AUTO: 48.7 % (ref 37.5–51)
HCV AB SER DONR QL: NORMAL
HGB BLD-MCNC: 17 G/DL (ref 13–17.7)
MCH RBC QN AUTO: 33.7 PG (ref 26.6–33)
MCHC RBC AUTO-ENTMCNC: 34.9 G/DL (ref 31.5–35.7)
MCV RBC AUTO: 96.4 FL (ref 79–97)
PLATELET # BLD AUTO: 192 10*3/MM3 (ref 140–450)
PMV BLD AUTO: 10.7 FL (ref 6–12)
POTASSIUM SERPL-SCNC: 4.2 MMOL/L (ref 3.5–5.2)
PROT SERPL-MCNC: 7.2 G/DL (ref 6–8.5)
RBC # BLD AUTO: 5.05 10*6/MM3 (ref 4.14–5.8)
SODIUM SERPL-SCNC: 136 MMOL/L (ref 136–145)
TSH SERPL DL<=0.05 MIU/L-ACNC: 2.13 UIU/ML (ref 0.27–4.2)
WBC NRBC COR # BLD: 9.42 10*3/MM3 (ref 3.4–10.8)

## 2022-01-04 PROCEDURE — 90686 IIV4 VACC NO PRSV 0.5 ML IM: CPT | Performed by: FAMILY MEDICINE

## 2022-01-04 PROCEDURE — 99214 OFFICE O/P EST MOD 30 MIN: CPT | Performed by: FAMILY MEDICINE

## 2022-01-04 PROCEDURE — 80053 COMPREHEN METABOLIC PANEL: CPT

## 2022-01-04 PROCEDURE — 90471 IMMUNIZATION ADMIN: CPT | Performed by: FAMILY MEDICINE

## 2022-01-04 PROCEDURE — 86803 HEPATITIS C AB TEST: CPT

## 2022-01-04 PROCEDURE — 85027 COMPLETE CBC AUTOMATED: CPT

## 2022-01-04 PROCEDURE — 0003A COVID-19 (PFIZER): CPT | Performed by: FAMILY MEDICINE

## 2022-01-04 PROCEDURE — 36415 COLL VENOUS BLD VENIPUNCTURE: CPT

## 2022-01-04 PROCEDURE — 84443 ASSAY THYROID STIM HORMONE: CPT

## 2022-01-04 PROCEDURE — 83036 HEMOGLOBIN GLYCOSYLATED A1C: CPT

## 2022-01-04 PROCEDURE — 91300 COVID-19 (PFIZER): CPT | Performed by: FAMILY MEDICINE

## 2022-01-04 RX ORDER — BENAZEPRIL HYDROCHLORIDE 40 MG/1
40 TABLET, FILM COATED ORAL DAILY
COMMUNITY
Start: 2022-01-02 | End: 2022-10-17 | Stop reason: SDUPTHER

## 2022-01-04 RX ORDER — CYCLOBENZAPRINE HCL 10 MG
1 TABLET ORAL DAILY PRN
COMMUNITY

## 2022-01-04 RX ORDER — SOTALOL HYDROCHLORIDE 80 MG/1
80 TABLET ORAL 2 TIMES DAILY
COMMUNITY
Start: 2021-12-20 | End: 2022-04-07 | Stop reason: SDUPTHER

## 2022-01-04 RX ORDER — EMPAGLIFLOZIN AND LINAGLIPTIN 25; 5 MG/1; MG/1
1 TABLET, FILM COATED ORAL DAILY
Qty: 90 TABLET | Refills: 1 | Status: SHIPPED | OUTPATIENT
Start: 2022-01-04 | End: 2022-07-22 | Stop reason: SDUPTHER

## 2022-01-04 RX ORDER — CETIRIZINE HYDROCHLORIDE 10 MG/1
10 TABLET ORAL DAILY
COMMUNITY

## 2022-01-04 RX ORDER — APIXABAN 5 MG/1
1 TABLET, FILM COATED ORAL 2 TIMES DAILY
COMMUNITY
Start: 2021-11-23 | End: 2022-04-05 | Stop reason: SDUPTHER

## 2022-01-04 RX ORDER — METFORMIN HYDROCHLORIDE 500 MG/1
1000 TABLET, EXTENDED RELEASE ORAL DAILY
Qty: 180 TABLET | Refills: 1 | Status: SHIPPED | OUTPATIENT
Start: 2022-01-04 | End: 2022-07-05

## 2022-01-04 RX ORDER — ATORVASTATIN CALCIUM 10 MG/1
10 TABLET, FILM COATED ORAL DAILY
Qty: 90 TABLET | Refills: 1 | Status: SHIPPED | OUTPATIENT
Start: 2022-01-04 | End: 2022-07-22 | Stop reason: SDUPTHER

## 2022-01-04 RX ORDER — AMLODIPINE BESYLATE 5 MG/1
1 TABLET ORAL DAILY
COMMUNITY
Start: 2021-11-23 | End: 2022-01-04

## 2022-01-04 RX ORDER — ATORVASTATIN CALCIUM 10 MG/1
1 TABLET, FILM COATED ORAL DAILY
COMMUNITY
Start: 2021-10-21 | End: 2022-01-04 | Stop reason: SDUPTHER

## 2022-01-04 RX ORDER — METOPROLOL SUCCINATE 25 MG/1
12.5 TABLET, EXTENDED RELEASE ORAL 2 TIMES DAILY
COMMUNITY
Start: 2021-12-20 | End: 2022-04-05 | Stop reason: SDUPTHER

## 2022-01-04 RX ORDER — EMPAGLIFLOZIN AND LINAGLIPTIN 25; 5 MG/1; MG/1
1 TABLET, FILM COATED ORAL DAILY
COMMUNITY
Start: 2021-11-15 | End: 2022-01-04 | Stop reason: SDUPTHER

## 2022-01-04 NOTE — PROGRESS NOTES
Ayden Murillo presents to [facility].    Chief Complaint:  Diabetes, blood pressure, cholesterol, pain issues    Subjective       History of Present Illness:  Ayden is in today for follow-up on his usual care.  He has a history of type 2 diabetes for which he remains on Metformin and Glyxambi.  He does check his sugar a few times a week.  He says that it runs in the 130s most of the time, but it can spike up to 200 or so depending on what he eats.  He denies any significant hypoglycemia.    He also has a history of hypertension, hyperlipidemia, and atrial fibrillation for which he remains on treatment.    He does have some pain issues presumably related to arthritis.  He had a previous left ankle surgery and he has pain in that ankle pretty frequently.  He also has pain in the right knee, low back, and right elbow.      Review of Systems:  Review of Systems   Constitutional: Negative for chills and fever.   Respiratory: Negative for cough and shortness of breath.    Cardiovascular: Negative for chest pain and palpitations.   Gastrointestinal: Negative for abdominal pain, nausea and vomiting.        Objective   Medical History:  Past Medical History:   • Asthma   • Atrial fibrillation (HCC)   • Essential hypertension   • Mixed hyperlipidemia   • Type 2 diabetes mellitus (HCC)     Past Surgical History:   • ANKLE SURGERY   • OTHER SURGICAL HISTORY    Cyst removal from neck near spine      Family History   Problem Relation Age of Onset   • Hypertension Mother    • Asthma Mother    • Dementia Mother    • Hypertension Brother      Social History     Tobacco Use   • Smoking status: Current Every Day Smoker     Types: Cigarettes   • Smokeless tobacco: Never Used   • Tobacco comment: Started smoking around age 19.   Substance Use Topics   • Alcohol use: Yes     Comment: 'hardly any anymore'       Health Maintenance Due   Topic Date Due   • COLORECTAL CANCER SCREENING  Never done   •  "ANNUAL PHYSICAL  Never done   • Hepatitis B (1 of 3 - Risk 3-dose series) Never done   • TDAP/TD VACCINES (1 - Tdap) Never done   • COVID-19 Vaccine (2 - Booster for Jim series) 06/26/2021   • INFLUENZA VACCINE  Never done   • HEPATITIS C SCREENING  Never done   • HEMOGLOBIN A1C  12/07/2021   • DIABETIC EYE EXAM  Never done        Immunization History   Administered Date(s) Administered   • COVID-19 (JIM) 05/01/2021       No Known Allergies     Medications:  Current Outpatient Medications on File Prior to Visit   Medication Sig   • benazepril (LOTENSIN) 40 MG tablet Take 40 mg by mouth Daily.   • cetirizine (zyrTEC) 10 MG tablet Take 10 mg by mouth Daily.   • cyclobenzaprine (FLEXERIL) 10 MG tablet Take 1 tablet by mouth Daily As Needed.   • Eliquis 5 MG tablet tablet Take 1 tablet by mouth 2 (Two) Times a Day.   • metoprolol succinate XL (TOPROL-XL) 25 MG 24 hr tablet Take 12.5 tablets by mouth 2 (Two) Times a Day.   • sotalol (BETAPACE) 80 MG tablet Take 80 mg by mouth 2 (Two) Times a Day.   • [DISCONTINUED] amLODIPine (NORVASC) 5 MG tablet Take 1 tablet by mouth Daily.   • [DISCONTINUED] atorvastatin (LIPITOR) 10 MG tablet Take 1 tablet by mouth Daily.   • [DISCONTINUED] Glyxambi 25-5 MG tablet Take 1 tablet by mouth Daily.   • [DISCONTINUED] metFORMIN (GLUCOPHAGE) 500 MG tablet TAKE 1 TABLET BY MOUTH TWICE DAILY WITH MORNING MEAL AND WITH EVENING MEAL     No current facility-administered medications on file prior to visit.       Vital Signs:   /73 (BP Location: Left arm, Patient Position: Sitting)   Pulse 98   Temp 97.8 °F (36.6 °C) (Oral)   Ht 172.7 cm (67.99\")   Wt 88.3 kg (194 lb 9.6 oz)   BMI 29.60 kg/m²       Physical Exam:  Physical Exam  Vitals and nursing note reviewed.   Constitutional:       General: He is not in acute distress.     Appearance: He is not ill-appearing.   HENT:      Right Ear: Tympanic membrane and ear canal normal.      Left Ear: Tympanic membrane and ear canal " normal.      Mouth/Throat:      Mouth: Mucous membranes are moist.      Comments: Pharynx appears normal  Eyes:      Extraocular Movements: Extraocular movements intact.      Pupils: Pupils are equal, round, and reactive to light.   Neck:      Thyroid: No thyromegaly.   Cardiovascular:      Rate and Rhythm: Normal rate and regular rhythm.      Pulses:           Dorsalis pedis pulses are 2+ on the right side and 2+ on the left side.      Heart sounds: No murmur heard.      Pulmonary:      Effort: Pulmonary effort is normal.      Breath sounds: Normal breath sounds.   Abdominal:      General: There is no distension.      Palpations: Abdomen is soft. There is no mass.      Tenderness: There is no abdominal tenderness.   Musculoskeletal:      Cervical back: Normal range of motion.   Feet:      Right foot:      Protective Sensation: 3 sites tested. 3 sites sensed.      Skin integrity: Skin integrity normal.      Left foot:      Protective Sensation: 3 sites tested. 3 sites sensed.      Skin integrity: Skin integrity normal.   Skin:     Findings: No lesion or rash.   Neurological:      General: No focal deficit present.      Mental Status: He is oriented to person, place, and time.      Cranial Nerves: No cranial nerve deficit.   Psychiatric:         Mood and Affect: Mood normal.         Result Review      The following data was reviewed by Arik Franco MD on 01/04/2022.  Lab Results   Component Value Date    WBC 10.10 03/10/2020    HGB 17.40 03/10/2020    HCT 50.5 03/10/2020    MCV 94.7 03/10/2020    .00 03/10/2020     Lab Results   Component Value Date    GLUCOSE 130 (H) 05/27/2021    BUN 15 05/27/2021    CREATININE 0.73 05/27/2021     05/27/2021    K 4.5 05/27/2021    CL 98 (L) 05/27/2021    CO2 27 05/27/2021    CALCIUM 9.2 05/27/2021    PROTEINTOT 7.3 05/27/2021    ALBUMIN 4.4 05/27/2021    ALT 26 05/27/2021    AST 19 05/27/2021    ALKPHOS 85 05/27/2021    BILITOT 0.66 05/27/2021    GLOB 2.9  05/27/2021    BCR 21 (H) 05/27/2021    ANIONGAP 15 05/27/2021      Lab Results   Component Value Date    CHLPL 147 05/27/2021    TRIG 84 05/27/2021    HDL 43 05/27/2021    LDL 87 05/27/2021     Lab Results   Component Value Date    TSH 1.870 05/27/2021     Lab Results   Component Value Date    HGBA1C 6.9 (H) 05/27/2021            Assessment and Plan:   Today, we have reviewed his care.  Ayden seems to be doing well overall.  We will update his labs as noted below.  His medications are refilled.  Cardiology has taken over management of some of his medications due to the atrial fibrillation issue.  Consideration of different treatments for that is ongoing.  He is not sure if he may end up having a procedure related to this or not.  Vaccines will be updated as noted below.  He is having some ongoing issues with pain primarily from working hard and probably some degree of arthritis.  I think for the time being use of Tylenol is our best option.  I did encourage him to be cautious not to ever take more than the package recommends.       Diagnoses and all orders for this visit:    1. Type 2 diabetes mellitus without complication, without long-term current use of insulin (HCC) (Primary)  -     Comprehensive Metabolic Panel; Future  -     TSH; Future  -     Hemoglobin A1c; Future  -     Glyxambi 25-5 MG tablet; Take 1 tablet by mouth Daily.  Dispense: 90 tablet; Refill: 1  -     metFORMIN ER (GLUCOPHAGE-XR) 500 MG 24 hr tablet; Take 2 tablets by mouth Daily.  Dispense: 180 tablet; Refill: 1    2. Mixed hyperlipidemia  -     Comprehensive Metabolic Panel; Future  -     TSH; Future  -     atorvastatin (LIPITOR) 10 MG tablet; Take 1 tablet by mouth Daily.  Dispense: 90 tablet; Refill: 1    3. Longstanding persistent atrial fibrillation (HCC)  -     CBC (No Diff); Future    4. Need for hepatitis C screening test  -     Hepatitis C Antibody; Future    5. Chronic pain of left ankle    6. Encounter for immunization  -      FluLaval/Fluarix/Fluzone >6 Months ([id number])  -     COVID-19 Vaccine (Pfizer)          Follow Up   Return in about 6 months (around 7/4/2022).  Patient was given instructions and counseling regarding his condition or for health maintenance advice. Please see specific information pulled into the AVS if appropriate.

## 2022-01-04 NOTE — PATIENT INSTRUCTIONS
Diabetes Mellitus Basics    Diabetes mellitus, or diabetes, is a long-term (chronic) disease. It occurs when the body does not properly use sugar (glucose) that is released from food after you eat.  Diabetes mellitus may be caused by one or both of these problems:  · Your pancreas does not make enough of a hormone called insulin.  · Your body does not react in a normal way to the insulin that it makes.  Insulin lets glucose enter cells in your body. This gives you energy. If you have diabetes, glucose cannot get into cells. This causes high blood glucose (hyperglycemia).  How to treat and manage diabetes  You may need to take insulin or other diabetes medicines daily to keep your glucose in balance. If you are prescribed insulin, you will learn how to give yourself insulin by injection. You may need to adjust the amount of insulin you take based on the foods that you eat.  You will need to check your blood glucose levels using a glucose monitor as told by your health care provider. The readings can help determine if you have low or high blood glucose.  Generally, you should have these blood glucose levels:  · Before meals (preprandial):  mg/dL (4.4-7.2 mmol/L).  · After meals (postprandial): below 180 mg/dL (10 mmol/L).  · Hemoglobin A1c (HbA1c) level: less than 7%.  Your health care provider will set treatment goals for you.  Keep all follow-up visits. This is important.  Follow these instructions at home:  Diabetes medicines  Take your diabetes medicines every day as told by your health care provider. List your diabetes medicines here:  · Name of medicine: ______________________________  ? Amount (dose): _______________ Time (a.m./p.m.): _______________ Notes: ___________________________________  · Name of medicine: ______________________________  ? Amount (dose): _______________ Time (a.m./p.m.): _______________ Notes: ___________________________________  · Name of medicine:  ______________________________  ? Amount (dose): _______________ Time (a.m./p.m.): _______________ Notes: ___________________________________  Insulin  If you use insulin, list the types of insulin you use here:  · Insulin type: ______________________________  ? Amount (dose): _______________ Time (a.m./p.m.): _______________Notes: ___________________________________  · Insulin type: ______________________________  ? Amount (dose): _______________ Time (a.m./p.m.): _______________ Notes: ___________________________________  · Insulin type: ______________________________  ? Amount (dose): _______________ Time (a.m./p.m.): _______________ Notes: ___________________________________  · Insulin type: ______________________________  ? Amount (dose): _______________ Time (a.m./p.m.): _______________ Notes: ___________________________________  · Insulin type: ______________________________  ? Amount (dose): _______________ Time (a.m./p.m.): _______________ Notes: ___________________________________  Managing blood glucose    Check your blood glucose levels using a glucose monitor as told by your health care provider.  Write down the times that you check your glucose levels here:  · Time: _______________ Notes: ___________________________________  · Time: _______________ Notes: ___________________________________  · Time: _______________ Notes: ___________________________________  · Time: _______________ Notes: ___________________________________  · Time: _______________ Notes: ___________________________________  · Time: _______________ Notes: ___________________________________    Low blood glucose  Low blood glucose (hypoglycemia) is when glucose is at or below 70 mg/dL (3.9 mmol/L). Symptoms may include:  · Feeling:  ? Hungry.  ? Sweaty and clammy.  ? Irritable or easily upset.  ? Dizzy.  ? Sleepy.  · Having:  ? A fast heartbeat.  ? A headache.  ? A change in your vision.  ? Numbness around the mouth, lips, or  tongue.  · Having trouble with:  ? Moving (coordination).  ? Sleeping.  Treating low blood glucose  To treat low blood glucose, eat or drink something containing sugar right away. If you can think clearly and swallow safely, follow the 15:15 rule:  · Take 15 grams of a fast-acting carb (carbohydrate), as told by your health care provider.  · Some fast-acting carbs are:  ? Glucose tablets: take 3-4 tablets.  ? Hard candy: eat 3-5 pieces.  ? Fruit juice: drink 4 oz (120 mL).  ? Regular (not diet) soda: drink 4-6 oz (120-180 mL).  ? Honey or sugar: eat 1 Tbsp (15 mL).  · Check your blood glucose levels 15 minutes after you take the carb.  · If your glucose is still at or below 70 mg/dL (3.9 mmol/L), take 15 grams of a carb again.  · If your glucose does not go above 70 mg/dL (3.9 mmol/L) after 3 tries, get help right away.  · After your glucose goes back to normal, eat a meal or a snack within 1 hour.  Treating very low blood glucose  If your glucose is at or below 54 mg/dL (3 mmol/L), you have very low blood glucose (severe hypoglycemia).  This is an emergency. Do not wait to see if the symptoms will go away. Get medical help right away. Call your local emergency services (911 in the [place] Do not drive yourself to the hospital.  Questions to ask your health care provider  · Should I talk with a diabetes educator?  · What equipment will I need to care for myself at home?  · What diabetes medicines do I need? When should I take them?  · How often do I need to check my blood glucose levels?  · What number can I call if I have questions?  · When is my follow-up visit?  · Where can I find a support group for people with diabetes?  Where to find more information  · American Diabetes Association: www.diabetes.org  · Association of Diabetes Care and Education Specialists: www.diabeteseducator.org  Contact a health care provider if:  · Your blood glucose is at or above 240 mg/dL (13.3 mmol/L) for 2 days in a row.  · You have  been sick or have had a fever for 2 days or more, and you are not getting better.  · You have any of these problems for more than 6 hours:  ? You cannot eat or drink.  ? You feel nauseous.  ? You vomit.  ? You have diarrhea.  Get help right away if:  · Your blood glucose is lower than 54 mg/dL (3 mmol/L).  · You get confused.  · You have trouble thinking clearly.  · You have trouble breathing.  These symptoms may represent a serious problem that is an emergency. Do not wait to see if the symptoms will go away. Get medical help right away. Call your local emergency services (911 in the [place] Do not drive yourself to the hospital.  Summary  · Diabetes mellitus is a chronic disease that occurs when the body does not properly use sugar (glucose) that is released from food after you eat.  · Take insulin and diabetes medicines as told.  · Check your blood glucose every day, as often as told.  · Keep all follow-up visits. This is important.  This information is not intended to replace advice given to you by your health care provider. Make sure you discuss any questions you have with your health care provider.  Document Revised: 04/20/2021 Document Reviewed: 04/20/2021  [employer] Patient Education © 2021 [employer]

## 2022-04-05 ENCOUNTER — OFFICE VISIT (OUTPATIENT)
Dept: FAMILY MEDICINE CLINIC | Age: 47
End: 2022-04-05

## 2022-04-05 VITALS
BODY MASS INDEX: 30.31 KG/M2 | HEART RATE: 90 BPM | SYSTOLIC BLOOD PRESSURE: 134 MMHG | WEIGHT: 200 LBS | DIASTOLIC BLOOD PRESSURE: 95 MMHG | HEIGHT: 68 IN

## 2022-04-05 DIAGNOSIS — E11.9 TYPE 2 DIABETES MELLITUS WITHOUT COMPLICATION, WITHOUT LONG-TERM CURRENT USE OF INSULIN: Primary | ICD-10-CM

## 2022-04-05 DIAGNOSIS — I10 ESSENTIAL HYPERTENSION: ICD-10-CM

## 2022-04-05 DIAGNOSIS — I48.11 LONGSTANDING PERSISTENT ATRIAL FIBRILLATION: ICD-10-CM

## 2022-04-05 LAB
EXPIRATION DATE: NORMAL
HBA1C MFR BLD: 9.4 %
Lab: NORMAL

## 2022-04-05 PROCEDURE — 83036 HEMOGLOBIN GLYCOSYLATED A1C: CPT | Performed by: FAMILY MEDICINE

## 2022-04-05 PROCEDURE — 99214 OFFICE O/P EST MOD 30 MIN: CPT | Performed by: FAMILY MEDICINE

## 2022-04-05 RX ORDER — APIXABAN 5 MG/1
5 TABLET, FILM COATED ORAL 2 TIMES DAILY
Qty: 180 TABLET | Refills: 3 | Status: SHIPPED | OUTPATIENT
Start: 2022-04-05 | End: 2023-01-23 | Stop reason: SDUPTHER

## 2022-04-05 RX ORDER — ASPIRIN 81 MG/1
81 TABLET ORAL DAILY
COMMUNITY
End: 2022-04-05

## 2022-04-05 RX ORDER — METOPROLOL SUCCINATE 50 MG/1
50 TABLET, EXTENDED RELEASE ORAL DAILY
Qty: 90 TABLET | Refills: 3 | Status: SHIPPED | OUTPATIENT
Start: 2022-04-05 | End: 2023-01-23 | Stop reason: SDUPTHER

## 2022-04-05 NOTE — PROGRESS NOTES
Ayden Murillo presents to [facility].    Chief Complaint:  Diabetes, a fib    Subjective       History of Present Illness:  Ayden is here for follow-up on type 2 diabetes.  He was noted on most recent testing to have an A1c of 8%.  He is currently taking Metformin and Glyxambi for diabetes.  His sugar readings vary somewhat depending on what he eats.  His numbers typically run in the 150s.  He admits that he is not very good about watching his diet.    Ayden also has atrial fibrillation.  He is currently taking metoprolol and sotalol for this.  He stopped taking Eliquis though.  He ran out of refills on this medication and opted not to go back over to see cardiology.  We have discussed potential stroke risk related to the diagnosis of atrial fibrillation.  He is taking aspirin 325 mg daily.      Review of Systems:  Review of Systems   Constitutional: Negative for chills and fever.   Respiratory: Negative for cough and shortness of breath.    Cardiovascular: Negative for chest pain and palpitations.   Gastrointestinal: Negative for abdominal pain, nausea and vomiting.        Objective   Medical History:  Past Medical History:   • Asthma   • Atrial fibrillation (HCC)   • Essential hypertension   • Mixed hyperlipidemia   • Type 2 diabetes mellitus (HCC)     Past Surgical History:   • ANKLE SURGERY   • OTHER SURGICAL HISTORY    Cyst removal from neck near spine      Family History   Problem Relation Age of Onset   • Hypertension Mother    • Asthma Mother    • Dementia Mother    • Hypertension Brother      Social History     Tobacco Use   • Smoking status: Current Every Day Smoker     Packs/day: 1.00     Types: Cigarettes   • Smokeless tobacco: Never Used   • Tobacco comment: Started smoking around age 19.   Substance Use Topics   • Alcohol use: Yes     Comment: 'hardly any anymore'       Health Maintenance Due   Topic Date Due   • COLORECTAL CANCER SCREENING  Never done   • ANNUAL  "PHYSICAL  Never done   • Hepatitis B (1 of 3 - Risk 3-dose series) Never done   • TDAP/TD VACCINES (1 - Tdap) Never done   • DIABETIC EYE EXAM  Never done        Immunization History   Administered Date(s) Administered   • COVID-19 (JIM) 05/01/2021   • COVID-19 (PFIZER) PURPLE CAP 01/04/2022   • FluLaval/Fluarix/Fluzone >6 01/04/2022   • Pneumococcal Polysaccharide (PPSV23) 12/13/2013       No Known Allergies     Medications:  Current Outpatient Medications on File Prior to Visit   Medication Sig   • atorvastatin (LIPITOR) 10 MG tablet Take 1 tablet by mouth Daily.   • benazepril (LOTENSIN) 40 MG tablet Take 40 mg by mouth Daily.   • cetirizine (zyrTEC) 10 MG tablet Take 10 mg by mouth Daily.   • cyclobenzaprine (FLEXERIL) 10 MG tablet Take 1 tablet by mouth Daily As Needed.   • Glyxambi 25-5 MG tablet Take 1 tablet by mouth Daily.   • metFORMIN ER (GLUCOPHAGE-XR) 500 MG 24 hr tablet Take 2 tablets by mouth Daily.   • sotalol (BETAPACE) 80 MG tablet Take 80 mg by mouth 2 (Two) Times a Day.   • [DISCONTINUED] aspirin 81 MG EC tablet Take 81 mg by mouth Daily.   • [DISCONTINUED] metoprolol succinate XL (TOPROL-XL) 25 MG 24 hr tablet Take 12.5 tablets by mouth 2 (Two) Times a Day.   • [DISCONTINUED] Eliquis 5 MG tablet tablet Take 1 tablet by mouth 2 (Two) Times a Day.     No current facility-administered medications on file prior to visit.       Vital Signs:   /92 (BP Location: Right arm, Patient Position: Sitting)   Pulse 102   Ht 172.7 cm (67.99\")   Wt 90.7 kg (200 lb)   BMI 30.42 kg/m²       Physical Exam:  Physical Exam  Vitals reviewed.   Constitutional:       General: He is not in acute distress.     Appearance: He is not ill-appearing.   Eyes:      Pupils: Pupils are equal, round, and reactive to light.   Neck:      Comments: No thyromegaly  Cardiovascular:      Rate and Rhythm: Normal rate. Rhythm irregular.   Pulmonary:      Effort: Pulmonary effort is normal.      Breath sounds: Normal breath " sounds.   Abdominal:      General: There is no distension.      Palpations: Abdomen is soft.      Tenderness: There is no abdominal tenderness.   Musculoskeletal:      Cervical back: Neck supple.   Lymphadenopathy:      Cervical: No cervical adenopathy.   Skin:     Findings: No lesion or rash.   Neurological:      Mental Status: He is alert.         Result Review      The following data was reviewed by Arik Franco MD on 04/05/2022.  Lab Results   Component Value Date    WBC 9.42 01/04/2022    HGB 17.0 01/04/2022    HCT 48.7 01/04/2022    MCV 96.4 01/04/2022     01/04/2022     Lab Results   Component Value Date    GLUCOSE 126 (H) 01/04/2022    BUN 13 01/04/2022    CREATININE 0.69 (L) 01/04/2022     01/04/2022    K 4.2 01/04/2022     01/04/2022    CO2 24.8 01/04/2022    CALCIUM 9.5 01/04/2022    PROTEINTOT 7.2 01/04/2022    ALBUMIN 4.70 01/04/2022    ALT 26 01/04/2022    AST 20 01/04/2022    ALKPHOS 91 01/04/2022    BILITOT 0.5 01/04/2022    EGFRIFNONA 123 01/04/2022    GLOB 2.5 01/04/2022    AGRATIO 1.9 01/04/2022    BCR 18.8 01/04/2022    ANIONGAP 8.2 01/04/2022      Lab Results   Component Value Date    CHLPL 147 05/27/2021    TRIG 84 05/27/2021    HDL 43 05/27/2021    LDL 87 05/27/2021     Lab Results   Component Value Date    TSH 2.130 01/04/2022     Lab Results   Component Value Date    HGBA1C 8.00 (H) 01/04/2022     No results found for: PSA         Assessment and Plan:   Today, we have reviewed his care.  Ayden is doing well overall.  We will repeat an A1c and see where his sugar is.  We may need to consider additional care for that.  I have encouraged him to try to be diligent with regard to diet.  Also, we discussed the diagnosis of atrial fibrillation.  He does seem to be an ongoing A. fib.  Specifically, we discussed the potential for stroke risk.  His [id number] score is 2  Which places him in a moderate category.  For this reason, I would recommend ongoing anticoagulation.   Eliquis will be prescribed for him.  We discussed potential risks with Eliquis including rare life-threatening bleeding.  We will change him over to metoprolol succinate 50 mg once daily.  I believe that he has been taking 25 mg twice daily.  He is not sure of the sotalol dose.  We will ask him to double check on that and we will ask him about it tomorrow.  Otherwise, no near-term change.  I do think it would be wise for him to see cardiology at least yearly for their guidance on this.  We will follow from there.    {CC Problem List  Visit Diagnosis  ROS  Review (Popup)  [facility] Maintenance  Quality  BestPractice  Medications  SmartSets  SnapShot Encounters  Media :23}   Diagnoses and all orders for this visit:    1. Type 2 diabetes mellitus without complication, without long-term current use of insulin (HCC) (Primary)  -     POC Glycosylated Hemoglobin (Hb A1C)    2. Longstanding persistent atrial fibrillation (HCC)  -     Eliquis 5 MG tablet tablet; Take 1 tablet by mouth 2 (Two) Times a Day.  Dispense: 180 tablet; Refill: 3  -     metoprolol succinate XL (TOPROL-XL) 50 MG 24 hr tablet; Take 1 tablet by mouth Daily.  Dispense: 90 tablet; Refill: 3    3. Essential hypertension  -     metoprolol succinate XL (TOPROL-XL) 50 MG 24 hr tablet; Take 1 tablet by mouth Daily.  Dispense: 90 tablet; Refill: 3          Follow Up   Return if symptoms worsen or fail to improve, for Recheck.  Patient was given instructions and counseling regarding his condition or for health maintenance advice. Please see specific information pulled into the AVS if appropriate.

## 2022-04-06 ENCOUNTER — TELEPHONE (OUTPATIENT)
Dept: FAMILY MEDICINE CLINIC | Age: 47
End: 2022-04-06

## 2022-04-06 NOTE — TELEPHONE ENCOUNTER
Caller: Ayden Murillo    Relationship: Self    Best call back number: [phone]    What is the best time to reach you: ANYTIME    Who are you requesting to speak with (clinical staff, provider,  specific staff member): TEX    What was the call regarding: A1C RESULTS    Do you require a callback: YES

## 2022-04-07 RX ORDER — SOTALOL HYDROCHLORIDE 80 MG/1
80 TABLET ORAL 2 TIMES DAILY
Qty: 180 TABLET | Refills: 1 | Status: SHIPPED | OUTPATIENT
Start: 2022-04-07 | End: 2022-07-22 | Stop reason: SDUPTHER

## 2022-04-26 ENCOUNTER — TELEPHONE (OUTPATIENT)
Dept: FAMILY MEDICINE CLINIC | Age: 47
End: 2022-04-26

## 2022-04-26 NOTE — TELEPHONE ENCOUNTER
Caller: Ayden Murillo    Relationship: Self    Best call back number: [phone]    What is the best time to reach you: ANY    Who are you requesting to speak with (clinical staff, provider,  specific staff member): CLINICAL STAFF    What was the call regarding: PATIENT WOULD LIKE TO SPEAK TO A NURSE ABOUT HIS BLOOD GLUCOSE LEVELS    Do you require a callback: YES

## 2022-04-27 NOTE — TELEPHONE ENCOUNTER
Noted.  His sugars seem to vary pretty widely.  I am not going to recommend any short-term change in his medication.  However, schedule a follow-up visit in about 3 months for us to review his care.  I would really encourage him between now and then to be diligent with regard to healthy diet and possibly work toward some gradual weight loss.  Let me know if he has other concerns.  Thanks.

## 2022-07-04 DIAGNOSIS — E11.9 TYPE 2 DIABETES MELLITUS WITHOUT COMPLICATION, WITHOUT LONG-TERM CURRENT USE OF INSULIN: ICD-10-CM

## 2022-07-05 RX ORDER — METFORMIN HYDROCHLORIDE 500 MG/1
TABLET, EXTENDED RELEASE ORAL
Qty: 180 TABLET | Refills: 0 | Status: SHIPPED | OUTPATIENT
Start: 2022-07-05 | End: 2022-07-22 | Stop reason: SDUPTHER

## 2022-07-22 ENCOUNTER — LAB (OUTPATIENT)
Dept: LAB | Facility: HOSPITAL | Age: 47
End: 2022-07-22

## 2022-07-22 ENCOUNTER — OFFICE VISIT (OUTPATIENT)
Dept: FAMILY MEDICINE CLINIC | Age: 47
End: 2022-07-22

## 2022-07-22 VITALS
HEIGHT: 68 IN | DIASTOLIC BLOOD PRESSURE: 80 MMHG | WEIGHT: 194.6 LBS | TEMPERATURE: 97.7 F | HEART RATE: 92 BPM | SYSTOLIC BLOOD PRESSURE: 111 MMHG | BODY MASS INDEX: 29.49 KG/M2

## 2022-07-22 DIAGNOSIS — I48.11 LONGSTANDING PERSISTENT ATRIAL FIBRILLATION: ICD-10-CM

## 2022-07-22 DIAGNOSIS — I10 ESSENTIAL HYPERTENSION: ICD-10-CM

## 2022-07-22 DIAGNOSIS — E11.65 TYPE 2 DIABETES MELLITUS WITH HYPERGLYCEMIA, WITHOUT LONG-TERM CURRENT USE OF INSULIN: ICD-10-CM

## 2022-07-22 DIAGNOSIS — F41.1 GENERALIZED ANXIETY DISORDER: ICD-10-CM

## 2022-07-22 DIAGNOSIS — M15.9 GENERALIZED OSTEOARTHRITIS: ICD-10-CM

## 2022-07-22 DIAGNOSIS — E78.2 MIXED HYPERLIPIDEMIA: ICD-10-CM

## 2022-07-22 DIAGNOSIS — Z12.11 SCREEN FOR COLON CANCER: ICD-10-CM

## 2022-07-22 DIAGNOSIS — E11.65 TYPE 2 DIABETES MELLITUS WITH HYPERGLYCEMIA, WITHOUT LONG-TERM CURRENT USE OF INSULIN: Primary | ICD-10-CM

## 2022-07-22 DIAGNOSIS — E11.9 TYPE 2 DIABETES MELLITUS WITHOUT COMPLICATION, WITHOUT LONG-TERM CURRENT USE OF INSULIN: ICD-10-CM

## 2022-07-22 LAB
DEPRECATED RDW RBC AUTO: 42.3 FL (ref 37–54)
ERYTHROCYTE [DISTWIDTH] IN BLOOD BY AUTOMATED COUNT: 11.7 % (ref 12.3–15.4)
HCT VFR BLD AUTO: 48.1 % (ref 37.5–51)
HGB BLD-MCNC: 16 G/DL (ref 13–17.7)
MCH RBC QN AUTO: 31.9 PG (ref 26.6–33)
MCHC RBC AUTO-ENTMCNC: 33.3 G/DL (ref 31.5–35.7)
MCV RBC AUTO: 95.8 FL (ref 79–97)
PLATELET # BLD AUTO: 187 10*3/MM3 (ref 140–450)
PMV BLD AUTO: 9.5 FL (ref 6–12)
RBC # BLD AUTO: 5.02 10*6/MM3 (ref 4.14–5.8)
WBC NRBC COR # BLD: 9.2 10*3/MM3 (ref 3.4–10.8)

## 2022-07-22 PROCEDURE — 80061 LIPID PANEL: CPT

## 2022-07-22 PROCEDURE — 99214 OFFICE O/P EST MOD 30 MIN: CPT | Performed by: FAMILY MEDICINE

## 2022-07-22 PROCEDURE — 85652 RBC SED RATE AUTOMATED: CPT

## 2022-07-22 PROCEDURE — 82550 ASSAY OF CK (CPK): CPT

## 2022-07-22 PROCEDURE — 82043 UR ALBUMIN QUANTITATIVE: CPT

## 2022-07-22 PROCEDURE — 83036 HEMOGLOBIN GLYCOSYLATED A1C: CPT

## 2022-07-22 PROCEDURE — 80050 GENERAL HEALTH PANEL: CPT

## 2022-07-22 PROCEDURE — 36415 COLL VENOUS BLD VENIPUNCTURE: CPT

## 2022-07-22 RX ORDER — SOTALOL HYDROCHLORIDE 80 MG/1
80 TABLET ORAL 2 TIMES DAILY
Qty: 180 TABLET | Refills: 1 | Status: SHIPPED | OUTPATIENT
Start: 2022-07-22 | End: 2023-01-23 | Stop reason: SDUPTHER

## 2022-07-22 RX ORDER — ATORVASTATIN CALCIUM 10 MG/1
10 TABLET, FILM COATED ORAL DAILY
Qty: 90 TABLET | Refills: 1 | Status: SHIPPED | OUTPATIENT
Start: 2022-07-22 | End: 2023-01-23 | Stop reason: SDUPTHER

## 2022-07-22 RX ORDER — EMPAGLIFLOZIN AND LINAGLIPTIN 25; 5 MG/1; MG/1
1 TABLET, FILM COATED ORAL DAILY
Qty: 90 TABLET | Refills: 1 | Status: SHIPPED | OUTPATIENT
Start: 2022-07-22 | End: 2023-01-23 | Stop reason: SDUPTHER

## 2022-07-22 RX ORDER — METFORMIN HYDROCHLORIDE 500 MG/1
1000 TABLET, EXTENDED RELEASE ORAL DAILY
Qty: 180 TABLET | Refills: 1 | Status: SHIPPED | OUTPATIENT
Start: 2022-07-22 | End: 2023-01-23 | Stop reason: SDUPTHER

## 2022-07-22 NOTE — PROGRESS NOTES
Ayden Murillo presents to [facility].    Chief Complaint:  Diabetes follow up, blood pressure, cholesterol, atrial fibrillation    Subjective       History of Present Illness:  Ayden is in today for follow-up on care of his diabetes.  He currently takes Glyxambi and metformin for this.  His blood sugar continues to run higher than we would like to see.  He checks it at most once daily.  His fasting blood sugars have been 150 or greater fairly consistently.    He also has hypertension, elevated cholesterol, and atrial fibrillation.  He remains on medication for these issues.    Ayden continues to struggle with a variety of pain issues.  He thinks a lot of this is related to arthritis issues.  He also works pretty hard.      Review of Systems:  Review of Systems   Constitutional: Negative for chills and fever.   Respiratory: Negative for cough and shortness of breath.    Cardiovascular: Negative for chest pain and palpitations.   Gastrointestinal: Negative for abdominal pain, nausea and vomiting.        Objective   Medical History:  Past Medical History:   • Asthma   • Atrial fibrillation (HCC)   • Essential hypertension   • Mixed hyperlipidemia   • Type 2 diabetes mellitus (HCC)     Past Surgical History:   • ANKLE SURGERY   • OTHER SURGICAL HISTORY    Cyst removal from neck near spine      Family History   Problem Relation Age of Onset   • Hypertension Mother    • Asthma Mother    • Dementia Mother    • Hypertension Brother      Social History     Tobacco Use   • Smoking status: Current Every Day Smoker     Packs/day: 1.00     Types: Cigarettes   • Smokeless tobacco: Never Used   • Tobacco comment: Started smoking around age 19.   Substance Use Topics   • Alcohol use: Yes     Comment: 'hardly any anymore'       Health Maintenance Due   Topic Date Due   • COLORECTAL CANCER SCREENING  Never done   • ANNUAL PHYSICAL  Never done   • Hepatitis B (1 of 3 - Risk 3-dose series) Never  "done   • TDAP/TD VACCINES (1 - Tdap) Never done   • Pneumococcal Vaccine 0-64 (2 - PCV) 12/13/2014   • DIABETIC EYE EXAM  Never done   • LIPID PANEL  05/27/2022   • URINE MICROALBUMIN  05/27/2022        Immunization History   Administered Date(s) Administered   • COVID-19 (JIM) 05/01/2021   • COVID-19 (PFIZER) PURPLE CAP 01/04/2022   • FluLaval/Fluarix/Fluzone >6 01/04/2022   • Pneumococcal Polysaccharide (PPSV23) 12/13/2013       No Known Allergies     Medications:  Current Outpatient Medications on File Prior to Visit   Medication Sig   • benazepril (LOTENSIN) 40 MG tablet Take 40 mg by mouth Daily.   • cetirizine (zyrTEC) 10 MG tablet Take 10 mg by mouth Daily.   • cyclobenzaprine (FLEXERIL) 10 MG tablet Take 1 tablet by mouth Daily As Needed.   • Eliquis 5 MG tablet tablet Take 1 tablet by mouth 2 (Two) Times a Day.   • metoprolol succinate XL (TOPROL-XL) 50 MG 24 hr tablet Take 1 tablet by mouth Daily.   • [DISCONTINUED] atorvastatin (LIPITOR) 10 MG tablet Take 1 tablet by mouth Daily.   • [DISCONTINUED] Glyxambi 25-5 MG tablet Take 1 tablet by mouth Daily.   • [DISCONTINUED] metFORMIN ER (GLUCOPHAGE-XR) 500 MG 24 hr tablet Take 2 tablets by mouth once daily   • [DISCONTINUED] sotalol (BETAPACE) 80 MG tablet Take 1 tablet by mouth 2 (Two) Times a Day.     No current facility-administered medications on file prior to visit.       Vital Signs:   /80 (BP Location: Right arm, Patient Position: Sitting)   Pulse 92   Temp 97.7 °F (36.5 °C) (Oral)   Ht 172.7 cm (67.99\")   Wt 88.3 kg (194 lb 9.6 oz)   BMI 29.60 kg/m²       Physical Exam:  Physical Exam  Vitals and nursing note reviewed.   Constitutional:       General: He is not in acute distress.     Appearance: He is not ill-appearing.   HENT:      Right Ear: Tympanic membrane and ear canal normal.      Left Ear: Tympanic membrane and ear canal normal.      Mouth/Throat:      Mouth: Mucous membranes are moist.      Comments: Pharynx appears " normal  Eyes:      Extraocular Movements: Extraocular movements intact.      Pupils: Pupils are equal, round, and reactive to light.   Neck:      Thyroid: No thyromegaly.   Cardiovascular:      Rate and Rhythm: Normal rate. Rhythm irregular.      Pulses:           Dorsalis pedis pulses are 2+ on the right side and 2+ on the left side.      Heart sounds: No murmur heard.  Pulmonary:      Effort: Pulmonary effort is normal.      Breath sounds: Normal breath sounds.   Abdominal:      General: There is no distension.      Palpations: Abdomen is soft. There is no mass.      Tenderness: There is no abdominal tenderness.   Musculoskeletal:      Cervical back: Normal range of motion.   Feet:      Right foot:      Protective Sensation: 3 sites tested. 3 sites sensed.      Skin integrity: Skin integrity normal.      Left foot:      Protective Sensation: 3 sites tested. 3 sites sensed.      Skin integrity: Skin integrity normal.   Skin:     Findings: No lesion or rash.   Neurological:      General: No focal deficit present.      Mental Status: He is oriented to person, place, and time.      Cranial Nerves: No cranial nerve deficit.   Psychiatric:         Mood and Affect: Mood normal.         Result Review      The following data was reviewed by Arik Franco MD on 07/22/2022.  Lab Results   Component Value Date    WBC 9.42 01/04/2022    HGB 17.0 01/04/2022    HCT 48.7 01/04/2022    MCV 96.4 01/04/2022     01/04/2022     Lab Results   Component Value Date    GLUCOSE 126 (H) 01/04/2022    BUN 13 01/04/2022    CREATININE 0.69 (L) 01/04/2022     01/04/2022    K 4.2 01/04/2022     01/04/2022    CO2 24.8 01/04/2022    CALCIUM 9.5 01/04/2022    PROTEINTOT 7.2 01/04/2022    ALBUMIN 4.70 01/04/2022    ALT 26 01/04/2022    AST 20 01/04/2022    ALKPHOS 91 01/04/2022    BILITOT 0.5 01/04/2022    EGFRIFNONA 123 01/04/2022    GLOB 2.5 01/04/2022    AGRATIO 1.9 01/04/2022    BCR 18.8 01/04/2022    ANIONGAP 8.2  01/04/2022      Lab Results   Component Value Date    CHLPL 147 05/27/2021    TRIG 84 05/27/2021    HDL 43 05/27/2021    LDL 87 05/27/2021     Lab Results   Component Value Date    TSH 2.130 01/04/2022     Lab Results   Component Value Date    HGBA1C 9.4 04/05/2022            Assessment and Plan:   Today, we have reviewed his care.  My main concern is his diabetes.  His A1c was not in a good range on most recent testing.  It sounds like his sugars are still running above goal.  We may be at a point where considering insulin would be reasonable.  We will update his labs and then move forward from there.  I will refill needed medications today.  Cologuard is to be ordered after discussing its limitations.  I am going to do a couple of tests related to his pain issues.  I think he mostly has arthritis pain.  The safest options for him would actually be Tylenol on a fairly regular basis followed by naproxen.  He is on Eliquis, and there could be some concerns about NSAIDs though.  We will follow.  He has also been struggling with some degree of anxiety.  He would be interested in possibly taking something for this.  We will give him a trial of sertraline and see if it is helpful.  He is not suicidal.       Diagnoses and all orders for this visit:    1. Type 2 diabetes mellitus with hyperglycemia, without long-term current use of insulin (HCC) (Primary)  -     MicroAlbumin, Urine, Random - Urine, Clean Catch; Future  -     Hemoglobin A1c; Future    2. Mixed hyperlipidemia  -     Lipid Panel; Future  -     Comprehensive Metabolic Panel; Future  -     TSH; Future  -     atorvastatin (LIPITOR) 10 MG tablet; Take 1 tablet by mouth Daily.  Dispense: 90 tablet; Refill: 1  -     CK; Future    3. Longstanding persistent atrial fibrillation (HCC)  -     CBC (No Diff); Future  -     sotalol (BETAPACE) 80 MG tablet; Take 1 tablet by mouth 2 (Two) Times a Day.  Dispense: 180 tablet; Refill: 1    4. Essential hypertension  -      Lipid Panel; Future    5. Type 2 diabetes mellitus without complication, without long-term current use of insulin (HCC)  -     metFORMIN ER (GLUCOPHAGE-XR) 500 MG 24 hr tablet; Take 2 tablets by mouth Daily.  Dispense: 180 tablet; Refill: 1  -     Glyxambi 25-5 MG tablet; Take 1 tablet by mouth Daily.  Dispense: 90 tablet; Refill: 1    6. Generalized osteoarthritis  -     Sedimentation Rate; Future    7. Screen for colon cancer  -     Cologuard - Stool, Per Rectum; Future    8. Generalized anxiety disorder  -     sertraline (ZOLOFT) 50 MG tablet; Take 1 tablet by mouth Daily.  Dispense: 30 tablet; Refill: 1          Follow Up   Return in about 6 months (around 1/22/2023) for Recheck.  Patient was given instructions and counseling regarding his condition or for health maintenance advice. Please see specific information pulled into the AVS if appropriate.

## 2022-07-23 LAB
ALBUMIN SERPL-MCNC: 4.4 G/DL (ref 3.5–5.2)
ALBUMIN UR-MCNC: <1.2 MG/DL
ALBUMIN/GLOB SERPL: 1.8 G/DL
ALP SERPL-CCNC: 92 U/L (ref 39–117)
ALT SERPL W P-5'-P-CCNC: 28 U/L (ref 1–41)
ANION GAP SERPL CALCULATED.3IONS-SCNC: 11 MMOL/L (ref 5–15)
AST SERPL-CCNC: 17 U/L (ref 1–40)
BILIRUB SERPL-MCNC: 0.6 MG/DL (ref 0–1.2)
BUN SERPL-MCNC: 18 MG/DL (ref 6–20)
BUN/CREAT SERPL: 24.7 (ref 7–25)
CALCIUM SPEC-SCNC: 9.1 MG/DL (ref 8.6–10.5)
CHLORIDE SERPL-SCNC: 101 MMOL/L (ref 98–107)
CHOLEST SERPL-MCNC: 119 MG/DL (ref 0–200)
CK SERPL-CCNC: 90 U/L (ref 20–200)
CO2 SERPL-SCNC: 24 MMOL/L (ref 22–29)
CREAT SERPL-MCNC: 0.73 MG/DL (ref 0.76–1.27)
EGFRCR SERPLBLD CKD-EPI 2021: 112.2 ML/MIN/1.73
ERYTHROCYTE [SEDIMENTATION RATE] IN BLOOD: 8 MM/HR (ref 0–15)
GLOBULIN UR ELPH-MCNC: 2.4 GM/DL
GLUCOSE SERPL-MCNC: 157 MG/DL (ref 65–99)
HBA1C MFR BLD: 8.9 % (ref 4.8–5.6)
HDLC SERPL-MCNC: 35 MG/DL (ref 40–60)
LDLC SERPL CALC-MCNC: 69 MG/DL (ref 0–100)
LDLC/HDLC SERPL: 2 {RATIO}
POTASSIUM SERPL-SCNC: 4.6 MMOL/L (ref 3.5–5.2)
PROT SERPL-MCNC: 6.8 G/DL (ref 6–8.5)
SODIUM SERPL-SCNC: 136 MMOL/L (ref 136–145)
TRIGL SERPL-MCNC: 70 MG/DL (ref 0–150)
TSH SERPL DL<=0.05 MIU/L-ACNC: 1.59 UIU/ML (ref 0.27–4.2)
VLDLC SERPL-MCNC: 15 MG/DL (ref 5–40)

## 2022-08-08 ENCOUNTER — TELEPHONE (OUTPATIENT)
Dept: FAMILY MEDICINE CLINIC | Age: 47
End: 2022-08-08

## 2022-08-08 NOTE — TELEPHONE ENCOUNTER
----- Message from Zeinab Santos LPN sent at 7/25/2022 12:03 PM EDT -----  How are fasting sugars running, what dose of Tresiba is pt using?

## 2022-08-09 NOTE — TELEPHONE ENCOUNTER
Noted.  I would recommend the followin.  Increase Tresiba to 12 units daily.  2.  He should continue to check and keep a written log of his fasting blood sugars.  3.  I want him to intentionally increase the Tresiba by 2 units every 3 days until his fasting sugar is running below 130 on a consistent basis.  4.  So, increase to 12 units today, and then plan to increase to 14 units starting on Friday, 16 units starting on Monday, and up from there.  5.  When his fasting sugar is running below 130, he should stay at the current dose at that time.  6.  Please TICKLE to call him back in 3 weeks to confirm his insulin dose and to see how his fasting sugars are running.  And, he should be aware there is some risk of low blood sugars with any change in insulin, but I think that risk is low given how his numbers have been.    Let me know if he has other concerns.  Thanks.

## 2022-08-30 ENCOUNTER — TELEPHONE (OUTPATIENT)
Dept: FAMILY MEDICINE CLINIC | Age: 47
End: 2022-08-30

## 2022-09-01 NOTE — TELEPHONE ENCOUNTER
Noted.  I would recommend he continue current care for diabetes including his current dose of insulin..  Please TICKLE for fingerstick A1c after 10/22/2022.  Let me know if he has other concerns.  Thanks.

## 2022-09-19 DIAGNOSIS — F41.1 GENERALIZED ANXIETY DISORDER: ICD-10-CM

## 2022-10-17 DIAGNOSIS — I48.11 LONGSTANDING PERSISTENT ATRIAL FIBRILLATION: ICD-10-CM

## 2022-10-17 RX ORDER — BENAZEPRIL HYDROCHLORIDE 40 MG/1
40 TABLET, FILM COATED ORAL DAILY
Qty: 90 TABLET | Refills: 1 | Status: SHIPPED | OUTPATIENT
Start: 2022-10-17 | End: 2023-01-23 | Stop reason: SDUPTHER

## 2022-10-17 NOTE — TELEPHONE ENCOUNTER
Caller: Ayden Murillo    Relationship: Self    Best call back number: [phone]    Requested Prescriptions:   Requested Prescriptions     Pending Prescriptions Disp Refills   • benazepril (LOTENSIN) 40 MG tablet       Sig: Take 1 tablet by mouth Daily.        Pharmacy where request should be sent: [facility] [address] [place] [address] DEVAN FUNEZ [facility] - [phone]  - [phone]     Additional details provided by patient: PATIENT STATES DR FARMER DENIED THE REFILL AND WOULD LIKE TO KNOW IF DR DONALD WOULD REFILL IT.    Does the patient have less than a 3 day supply:  [x] Yes  [] No    Gallo Castellon Rep   10/17/22 12:34 EDT

## 2022-10-24 ENCOUNTER — TELEPHONE (OUTPATIENT)
Dept: FAMILY MEDICINE CLINIC | Age: 47
End: 2022-10-24

## 2022-10-24 NOTE — TELEPHONE ENCOUNTER
----- Message from Zeinab Santos LPN sent at 9/1/2022  8:58 AM EDT -----  TICKLE for fingerstick A1c after 10/22/2022.

## 2022-10-25 ENCOUNTER — CLINICAL SUPPORT (OUTPATIENT)
Dept: FAMILY MEDICINE CLINIC | Age: 47
End: 2022-10-25

## 2022-10-25 DIAGNOSIS — E11.65 TYPE 2 DIABETES MELLITUS WITH HYPERGLYCEMIA, WITHOUT LONG-TERM CURRENT USE OF INSULIN: Primary | ICD-10-CM

## 2022-10-25 LAB
EXPIRATION DATE: NORMAL
HBA1C MFR BLD: 7.8 %
Lab: NORMAL

## 2022-10-25 PROCEDURE — 83036 HEMOGLOBIN GLYCOSYLATED A1C: CPT | Performed by: FAMILY MEDICINE

## 2022-11-23 ENCOUNTER — TELEPHONE (OUTPATIENT)
Dept: FAMILY MEDICINE CLINIC | Age: 47
End: 2022-11-23

## 2022-11-23 NOTE — TELEPHONE ENCOUNTER
----- Message from Zeinab Santos LPN sent at 11/1/2022  9:54 AM EDT -----  TICKLE to call him again in 3 weeks and see how the fasting sugars are doing AND to confirm his insulin dose.

## 2022-11-24 NOTE — TELEPHONE ENCOUNTER
Noted.  Since he is having some sugars below 100, I would recommend he stay at 30 units for the time being.  I would encourage him to be especially diligent with regard to diet and weight over the Christmas season.  We will recheck labs at his follow-up visit in January.  If he has a significant change with his care between now then, he should reach out to us.  Let me know if he has other concerns.  Thanks.

## 2023-01-17 NOTE — PROGRESS NOTES
Ayden Murillo. 1975     Office/Outpatient Visit    Visit Date: Fri, Sep 21, 2018 09:28 am    Provider: Arik Franco MD (Assistant: Mandy Keane MA)    Location: [facility]        Electronically signed by Arik Franco MD on  09/22/2018 06:13:30 AM                             SUBJECTIVE:        CC: diabetes, blood pressure, cholesterol         HPI:         Patient presents with type 2 diabetes.  Compliance with treatment has been poor; he does not follow-up as directed.  Current meds include an oral hypoglycemic ( Glucophage XR and Jardiance ).  He reports home blood glucose readings have been fairly good, with average fasting glucoses running in the 120-150 mg/dL range.  Most recent lab results include Hemoglobin A1c:  10.1 (%) (01/30/2018), LDL:  89 (mg/dL) (01/30/2018).          Additionally, he presents with history of hypercholesterolemia.  current treatment includes Lipitor.  Compliance with treatment has been good; he takes his medication as directed and follows up as directed.  He denies experiencing any hypercholesterolemia related symptoms.          In regard to the essential hypertension, his current cardiac medication regimen includes a combination medication ( Lotrel ).  Mr. Murillo does not check his blood pressure other than at his clinic appointments.  He is tolerating the medication well without side effects.  Compliance with treatment has been poor; he runs out of medication and doesn't follow up.      ROS:     CONSTITUTIONAL:  Negative for chills and fever.      CARDIOVASCULAR:  Negative for chest pain and palpitations.      RESPIRATORY:  Negative for recent cough and dyspnea.      GASTROINTESTINAL:  Negative for abdominal pain, nausea and vomiting.          PMH/FM/SH:     Last Reviewed on 9/21/2018 10:00 AM by Arik Franco    Past Medical History:         Asthma    Hypertension         Surgical History:     NONE         Family History:         Positive for  "Coronary Artery Disease ( pat. GM ) and Hypertension ( mother; brother ).      Positive for Asthma ( mother ).      Positive for Cerebrovascular Accident ( pat. GM ).          Social History:     Occupation: Flowers     Marital Status:      Children: None         Tobacco/Alcohol/Supplements:     Last Reviewed on 9/21/2018 10:00 AM by Arik Franco    Tobacco: Currently smokes 1/2 to 1 pack per day.          Alcohol: \"maybe 6 every 2-3 weeks\"         Substance Abuse History:     Last Reviewed on 9/21/2018 10:00 AM by Arik Franco    NEGATIVE         Mental Health History:     Last Reviewed on 9/21/2018 10:00 AM by Arik Franco        Communicable Diseases (eg STDs):     Last Reviewed on 9/21/2018 10:00 AM by Arik Franco            Current Problems:     Last Reviewed on 9/21/2018 10:00 AM by Arik Franco    Depression with anxiety     Hypercholesterolemia     Allergic rhinitis     Type 2 diabetes     Erectile dysfunction due to organic reasons     Essential hypertension         Immunizations:     zzFluzone pf-quadrivalent 3 and up 9/17/2014     Fluzone (3 + years dose) 12/13/2013     Fluzone pf (3+ years dose) 1/19/2016     Influenza Virus Vaccine pf (adult) 2/8/2018     PNEUMOVAX 23 (Pneumococcal PPV23) 12/13/2013         Allergies:     Last Reviewed on 9/21/2018 10:00 AM by Arik Franco      No Known Drug Allergies.         Current Medications:     Last Reviewed on 9/21/2018 10:00 AM by Arik Franco    Lipitor 10mg Tablet Take 1 tablet(s) by mouth daily     Lotrel 5mg/20mg Capsules Take 1 capsule(s) by mouth daily     Jardiance 25mg Tablet Take 1 tablet(s) by mouth qam     Metformin HCl 500mg Tablets, Extended Release Take 2 tablet(s) by mouth daily     Cyclobenzaprine HCl 10mg Tablet Take 1/2 to 1 tablet every 8 hours as needed for muscle spasm     Glucose Reagent Blood Test Strips  Reagent Strips Check blood sugar once daily before " breakfast     Aspirin (ASA) 81mg Tablets, Enteric Coated 1 tab daily     Benadryl Allergy 25mg Tablet 2 TAB DAILY     Zyrtec 10mg Tablet 1 tab daily     Diclofenac Sodium 75mg Tablets, Enteric Coated 1 tab bid         OBJECTIVE:        Vitals:         Current: 9/21/2018 9:33:09 AM    Ht:  5 ft, 8 in;  Wt: 188.6 lbs;  BMI: 28.7    T: 98.4 F (oral);  BP: 151/84 mm Hg (left arm, sitting);  P: 64 bpm (left arm (BP Cuff), sitting);  sCr: 0.71 mg/dL;  GFR: 127.89        Exams:     PHYSICAL EXAM:     GENERAL: Vitals recorded well developed, well nourished;     EYES: extraocular movements intact; conjunctiva and cornea are normal; PERRL;     E/N/T: EARS:  normal external auditory canals and tympanic membranes;  grossly normal hearing; OROPHARYNX:  normal mucosa, dentition, gingiva, and posterior pharynx;     NECK: range of motion is normal; thyroid is non-palpable;     RESPIRATORY: normal respiratory rate and pattern with no distress; normal breath sounds with no rales, rhonchi, wheezes or rubs;     CARDIOVASCULAR: normal rate; rhythm is regular;  no systolic murmur;     GASTROINTESTINAL: nontender; normal bowel sounds; no masses;     SKIN:  no significant rashes or lesions; no suspicious moles;     Foot exam performed.      Left foot exam    Protective sensation using Monofilament test: NORMAL sensation. Patient detects .07 grams of force which is considered normal.    Vascular status: normal peripheral vascular exam with palpable dorsal pedal and posterior tibal pulses and brisk digital capillary refill    Skin is intact without sores or ulcers    Right foot exam    Protective sensation using Monofilament test: NORMAL sensation. Patient detects .07 grams of force which is considered normal.    Vascular status: normal peripheral vascular exam with palpable dorsal pedal and posterior tibal pulses and brisk digital capillary refill    Skin is intact without sores or ulcers         Procedures:     Influenza vaccination     1.  Influenza, seasonal PF (children 3 years to adult): 0.5 ml unit dose given IM in the right upper arm; administered by AS;  lot number [id number]; expires 06/30/19 Regarding contraindications to an Influenza vaccine: Denies moderate/severe illness with/without fever; serious reaction to eggs, egg proteins, gentamicin, gelatin, arginine, neomycin or polymixin; serious reaction after recieving previous influenza vaccines; and history of Guillain-P[place]cair[place] Syndrome.          Vaccination against viral hepatitis     1. Hepatitis A (adult): 0.5 ml given IM in the left upper arm; administered by AS;  lot number [id number]; expires 01/29/21             ASSESSMENT           250.00   E11.9  Type 2 diabetes              DDx:     272.0   E78.4  Hypercholesterolemia              DDx:     401.1   I10  Essential hypertension              DDx:     V04.81   Z23  Influenza vaccination              DDx:     V05.3   Z23  Vaccination against viral hepatitis              DDx:         ORDERS:         Meds Prescribed:       Refill of: Lipitor (Atorvastatin Calcium) 10mg Tablet Take 1 tablet(s) by mouth daily  #90 (Ninety) tablet(s) Refills: 1       Refill of: Lotrel (Amlodipine/Benazepril) 5mg/20mg Capsules Take 1 capsule(s) by mouth daily  #90 (Ninety) capsule(s) Refills: 1       Refill of: Metformin HCl 500mg Tablets, Extended Release Take 2 tablet(s) by mouth daily  #180 (One [place] and Eighty) tablet(s) Refills: 1       Refill of: Cyclobenzaprine HCl 10mg Tablet Take 1/2 to 1 tablet every 8 hours as needed for muscle spasm  #90 (Ninety) tablet(s) Refills: 1         Lab Orders:       06871  DIAB2 - [facility] CMP A1C LIPID AND MICRO ALBUM CR RATIO: [id number]  (Send-Out)         14740  TSH - [facility] TSH  (Send-Out)           Procedures Ordered:       27373  Immunization administration; each additional vaccine/toxoid  (In-House)         33983  HepA vaccine adult dose for intramuscular use  (In-House)         59283  Immunization  administration; one vaccine  (In-House)           Other Orders:       2028F  Foot examination performed (includes examination through visual inspection, sensory exam with monofi  (In-House)         32647  Influenza virus vaccine, quadrivalent, split virus, preservative free 3 years of age & older  (In-House)         4004F  Pt scrnd tobacco use rcvd tobacco cessation talk  (In-House)           Calculated BMI above the upper parameter and a follow-up plan was documented in the medical record  (In-House)                   PLAN:          Type 2 diabetes     LABORATORY:  Labs ordered to be performed today include Diabetes Panel 2;CMP, A1C, Lipid, Microalbumin:Creatinine Ratio and TSH.      RECOMMENDATIONS given include: Today, we have again reviewed Ayden care.  He has lost weight, and I'm hopeful the addition of Jardiance will show an improved A1C.  I have encouraged him to be compliant with follow up and other care recommendations.  We will try to arrange an eye exam for him.  Labs to be done today.  Vaccines as noted..  MIPS Smoking cessation encouraged. Counseling for less than 3 minutes.      BMI Elevated - Follow-Up Plan: He was provided education on weight loss strategies           Prescriptions:       Refill of: Metformin HCl 500mg Tablets, Extended Release Take 2 tablet(s) by mouth daily  #180 (One [place] and Eighty) tablet(s) Refills: 1           Orders:       2028F  Foot examination performed (includes examination through visual inspection, sensory exam with monofi  (In-House)         58574  DIAB2 - [facility] CMP A1C LIPID AND MICRO ALBUM CR RATIO: [id number]  (Send-Out)         54459  TSH - [facility] TSH  (Send-Out)         4004F  Pt scrnd tobacco use rcvd tobacco cessation talk  (In-House)           Calculated BMI above the upper parameter and a follow-up plan was documented in the medical record  (In-House)             Patient Education Handouts:       Non-Insulin Dependent Diabetes  Detail Level: Simple Mellitus (NIDDM)           Hypercholesterolemia           Prescriptions:       Refill of: Lipitor (Atorvastatin Calcium) 10mg Tablet Take 1 tablet(s) by mouth daily  #90 (Ninety) tablet(s) Refills: 1          Essential hypertension           Prescriptions:       Refill of: Lotrel (Amlodipine/Benazepril) 5mg/20mg Capsules Take 1 capsule(s) by mouth daily  #90 (Ninety) capsule(s) Refills: 1          Influenza vaccination           Orders:       79017  Immunization administration; one vaccine  (In-House)         63204  Influenza virus vaccine, quadrivalent, split virus, preservative free 3 years of age & older  (In-House)            Vaccination against viral hepatitis           Orders:       10509  Immunization administration; each additional vaccine/toxoid  (In-House)         39116  HepA vaccine adult dose for intramuscular use  (In-House)               Other Prescriptions:       Refill of: Cyclobenzaprine HCl 10mg Tablet Take 1/2 to 1 tablet every 8 hours as needed for muscle spasm  #90 (Ninety) tablet(s) Refills: 1         CHARGE CAPTURE           **Please note: ICD descriptions below are intended for billing purposes only and may not represent clinical diagnoses**        Primary Diagnosis:         250.00 Type 2 diabetes            E11.9    Type 2 diabetes mellitus without complications              Orders:          22651   Office/outpatient visit; established patient, level 4  (In-House)             2028F   Foot examination performed (includes examination through visual inspection, sensory exam with monofi  (In-House)             4004F   Pt scrnd tobacco use rcvd tobacco cessation talk  (In-House)                Calculated BMI above the upper parameter and a follow-up plan was documented in the medical record  (In-House)           272.0 Hypercholesterolemia            E78.4    Other hyperlipidemia    401.1 Essential hypertension            I10    Essential (primary) hypertension    V04.81 Influenza vaccination             Z23    Encounter for immunization              Orders:          94391   Immunization administration; one vaccine  (In-House)             28967   Influenza virus vaccine, quadrivalent, split virus, preservative free 3 years of age & older  (In-House)           V05.3 Vaccination against viral hepatitis            Z23    Encounter for immunization              Orders:          71943   Immunization administration; each additional vaccine/toxoid  (In-House)             02233   HepA vaccine adult dose for intramuscular use  (In-House)

## 2023-01-23 ENCOUNTER — LAB (OUTPATIENT)
Dept: LAB | Facility: HOSPITAL | Age: 48
End: 2023-01-23
Payer: COMMERCIAL

## 2023-01-23 ENCOUNTER — OFFICE VISIT (OUTPATIENT)
Dept: FAMILY MEDICINE CLINIC | Age: 48
End: 2023-01-23
Payer: COMMERCIAL

## 2023-01-23 VITALS
SYSTOLIC BLOOD PRESSURE: 113 MMHG | HEIGHT: 68 IN | DIASTOLIC BLOOD PRESSURE: 88 MMHG | HEART RATE: 89 BPM | TEMPERATURE: 97.9 F | BODY MASS INDEX: 31.98 KG/M2 | WEIGHT: 211 LBS

## 2023-01-23 DIAGNOSIS — E11.9 TYPE 2 DIABETES MELLITUS WITHOUT COMPLICATION, WITHOUT LONG-TERM CURRENT USE OF INSULIN: ICD-10-CM

## 2023-01-23 DIAGNOSIS — E11.65 TYPE 2 DIABETES MELLITUS WITH HYPERGLYCEMIA, WITHOUT LONG-TERM CURRENT USE OF INSULIN: ICD-10-CM

## 2023-01-23 DIAGNOSIS — Z00.00 PHYSICAL EXAM: Primary | ICD-10-CM

## 2023-01-23 DIAGNOSIS — E78.2 MIXED HYPERLIPIDEMIA: ICD-10-CM

## 2023-01-23 DIAGNOSIS — I10 ESSENTIAL HYPERTENSION: ICD-10-CM

## 2023-01-23 DIAGNOSIS — F41.1 GENERALIZED ANXIETY DISORDER: ICD-10-CM

## 2023-01-23 DIAGNOSIS — Z23 ENCOUNTER FOR IMMUNIZATION: ICD-10-CM

## 2023-01-23 DIAGNOSIS — I48.11 LONGSTANDING PERSISTENT ATRIAL FIBRILLATION: ICD-10-CM

## 2023-01-23 LAB
ALBUMIN SERPL-MCNC: 4.4 G/DL (ref 3.5–5.2)
ALBUMIN/GLOB SERPL: 1.5 G/DL
ALP SERPL-CCNC: 102 U/L (ref 39–117)
ALT SERPL W P-5'-P-CCNC: 27 U/L (ref 1–41)
ANION GAP SERPL CALCULATED.3IONS-SCNC: 11.4 MMOL/L (ref 5–15)
AST SERPL-CCNC: 25 U/L (ref 1–40)
BILIRUB SERPL-MCNC: 0.5 MG/DL (ref 0–1.2)
BUN SERPL-MCNC: 12 MG/DL (ref 6–20)
BUN/CREAT SERPL: 14.8 (ref 7–25)
CALCIUM SPEC-SCNC: 9.2 MG/DL (ref 8.6–10.5)
CHLORIDE SERPL-SCNC: 100 MMOL/L (ref 98–107)
CO2 SERPL-SCNC: 25.6 MMOL/L (ref 22–29)
CREAT SERPL-MCNC: 0.81 MG/DL (ref 0.76–1.27)
DEPRECATED RDW RBC AUTO: 43.2 FL (ref 37–54)
EGFRCR SERPLBLD CKD-EPI 2021: 108.8 ML/MIN/1.73
ERYTHROCYTE [DISTWIDTH] IN BLOOD BY AUTOMATED COUNT: 12.1 % (ref 12.3–15.4)
GLOBULIN UR ELPH-MCNC: 3 GM/DL
GLUCOSE SERPL-MCNC: 141 MG/DL (ref 65–99)
HBA1C MFR BLD: 9.2 % (ref 4.8–5.6)
HCT VFR BLD AUTO: 53.9 % (ref 37.5–51)
HGB BLD-MCNC: 18.2 G/DL (ref 13–17.7)
MCH RBC QN AUTO: 32.2 PG (ref 26.6–33)
MCHC RBC AUTO-ENTMCNC: 33.8 G/DL (ref 31.5–35.7)
MCV RBC AUTO: 95.2 FL (ref 79–97)
PLATELET # BLD AUTO: 189 10*3/MM3 (ref 140–450)
PMV BLD AUTO: 9.5 FL (ref 6–12)
POTASSIUM SERPL-SCNC: 4.9 MMOL/L (ref 3.5–5.2)
PROT SERPL-MCNC: 7.4 G/DL (ref 6–8.5)
RBC # BLD AUTO: 5.66 10*6/MM3 (ref 4.14–5.8)
SODIUM SERPL-SCNC: 137 MMOL/L (ref 136–145)
TSH SERPL DL<=0.05 MIU/L-ACNC: 2.41 UIU/ML (ref 0.27–4.2)
WBC NRBC COR # BLD: 11.17 10*3/MM3 (ref 3.4–10.8)

## 2023-01-23 PROCEDURE — 91312 COVID-19 (PFIZER) BIVALENT BOOSTER 12+YRS: CPT | Performed by: FAMILY MEDICINE

## 2023-01-23 PROCEDURE — 85027 COMPLETE CBC AUTOMATED: CPT

## 2023-01-23 PROCEDURE — 36415 COLL VENOUS BLD VENIPUNCTURE: CPT

## 2023-01-23 PROCEDURE — 84443 ASSAY THYROID STIM HORMONE: CPT

## 2023-01-23 PROCEDURE — 80053 COMPREHEN METABOLIC PANEL: CPT

## 2023-01-23 PROCEDURE — 90471 IMMUNIZATION ADMIN: CPT | Performed by: FAMILY MEDICINE

## 2023-01-23 PROCEDURE — 99396 PREV VISIT EST AGE 40-64: CPT | Performed by: FAMILY MEDICINE

## 2023-01-23 PROCEDURE — 90677 PCV20 VACCINE IM: CPT | Performed by: FAMILY MEDICINE

## 2023-01-23 PROCEDURE — 0124A COVID-19 (PFIZER) BIVALENT BOOSTER 12+YRS: CPT | Performed by: FAMILY MEDICINE

## 2023-01-23 PROCEDURE — 83036 HEMOGLOBIN GLYCOSYLATED A1C: CPT

## 2023-01-23 RX ORDER — APIXABAN 5 MG/1
5 TABLET, FILM COATED ORAL 2 TIMES DAILY
Qty: 180 TABLET | Refills: 3 | Status: SHIPPED | OUTPATIENT
Start: 2023-01-23

## 2023-01-23 RX ORDER — BENAZEPRIL HYDROCHLORIDE 40 MG/1
40 TABLET, FILM COATED ORAL DAILY
Qty: 90 TABLET | Refills: 1 | Status: SHIPPED | OUTPATIENT
Start: 2023-01-23

## 2023-01-23 RX ORDER — METOPROLOL SUCCINATE 50 MG/1
50 TABLET, EXTENDED RELEASE ORAL DAILY
Qty: 90 TABLET | Refills: 3 | Status: SHIPPED | OUTPATIENT
Start: 2023-01-23

## 2023-01-23 RX ORDER — METFORMIN HYDROCHLORIDE 500 MG/1
1000 TABLET, EXTENDED RELEASE ORAL DAILY
Qty: 180 TABLET | Refills: 1 | Status: SHIPPED | OUTPATIENT
Start: 2023-01-23

## 2023-01-23 RX ORDER — ATORVASTATIN CALCIUM 10 MG/1
10 TABLET, FILM COATED ORAL DAILY
Qty: 90 TABLET | Refills: 1 | Status: SHIPPED | OUTPATIENT
Start: 2023-01-23

## 2023-01-23 RX ORDER — SOTALOL HYDROCHLORIDE 80 MG/1
80 TABLET ORAL 2 TIMES DAILY
Qty: 180 TABLET | Refills: 1 | Status: SHIPPED | OUTPATIENT
Start: 2023-01-23

## 2023-01-23 RX ORDER — EMPAGLIFLOZIN AND LINAGLIPTIN 25; 5 MG/1; MG/1
1 TABLET, FILM COATED ORAL DAILY
Qty: 90 TABLET | Refills: 1 | Status: SHIPPED | OUTPATIENT
Start: 2023-01-23

## 2023-01-23 NOTE — PROGRESS NOTES
Ayden Murillo presents to [facility].    Chief Complaint:  Annual physical, diabetes, blood pressure, cholesterol    Subjective       History of Present Illness:  Ayden is in today for annual physical.  He is 47 years old and works for [employer] where he has been for almost 5 years.  He does smoke cigarettes, about a pack daily.  He states he does not use alcohol any longer.  He is up-to-date on Cologuard which was done in August of last year.    In addition, Ayden is here for follow-up on his care.  He has type 2 diabetes for which he is currently using 30 units of Tresiba.  His weight is up about 17 pounds since he was seen most recently.  He continues to take Glyxambi and metformin.    Ayden also has hypertension, elevated cholesterol, and atrial fibrillation for which he remains on treatments as noted below.    Review of Systems:  Review of Systems   Constitutional: Negative for chills and fever.   Respiratory: Negative for cough and shortness of breath.    Cardiovascular: Negative for chest pain and palpitations.   Gastrointestinal: Negative for abdominal pain, nausea and vomiting.   Musculoskeletal: Positive for arthralgias (presumably from arthritis pain).        Objective   Medical History:  Past Medical History:   • Asthma   • Atrial fibrillation (HCC)   • Essential hypertension   • Mixed hyperlipidemia   • Type 2 diabetes mellitus (HCC)     Past Surgical History:   • ANKLE SURGERY   • OTHER SURGICAL HISTORY    Cyst removal from neck near spine      Family History   Problem Relation Age of Onset   • Hypertension Mother    • Asthma Mother    • Dementia Mother    • Hypertension Brother      Social History     Tobacco Use   • Smoking status: Every Day     Packs/day: 1.00     Types: Cigarettes   • Smokeless tobacco: Never   • Tobacco comments:     Started smoking around age 19.   Substance Use Topics   • Alcohol use: Yes     Comment: 'hardly any anymore'  Last Appointment:  1/20/2020  No future appointments.      Patient asking for Anderson Mcginnis, she is out and in [place]       Health Maintenance Due   Topic Date Due   • Hepatitis B (1 of 3 - 3-dose series) Never done   • TDAP/TD VACCINES (1 - Tdap) Never done   • DIABETIC EYE EXAM  Never done        Immunization History   Administered Date(s) Administered   • COVID-19 (JIM) 05/01/2021   • COVID-19 (PFIZER) BIVALENT BOOSTER 12+YRS 01/23/2023   • COVID-19 (PFIZER) PURPLE CAP 01/04/2022   • FluLaval/Fluzone >6mos 01/04/2022, 12/15/2022   • Pneumococcal Conjugate 20-Valent (PCV20) 01/23/2023   • Pneumococcal Polysaccharide (PPSV23) 12/13/2013       No Known Allergies     Medications:  Current Outpatient Medications on File Prior to Visit   Medication Sig   • cetirizine (zyrTEC) 10 MG tablet Take 10 mg by mouth Daily.   • cyclobenzaprine (FLEXERIL) 10 MG tablet Take 1 tablet by mouth Daily As Needed.   • [DISCONTINUED] atorvastatin (LIPITOR) 10 MG tablet Take 1 tablet by mouth Daily.   • [DISCONTINUED] benazepril (LOTENSIN) 40 MG tablet Take 1 tablet by mouth Daily.   • [DISCONTINUED] Eliquis 5 MG tablet tablet Take 1 tablet by mouth 2 (Two) Times a Day.   • [DISCONTINUED] Glyxambi 25-5 MG tablet Take 1 tablet by mouth Daily.   • [DISCONTINUED] insulin degludec (TRESIBA FLEXTOUCH) 100 UNIT/ML solution pen-injector injection Inject 20 Units under the skin into the appropriate area as directed Daily. (Patient taking differently: Inject 30 Units under the skin into the appropriate area as directed Daily.)   • [DISCONTINUED] metFORMIN ER (GLUCOPHAGE-XR) 500 MG 24 hr tablet Take 2 tablets by mouth Daily.   • [DISCONTINUED] metoprolol succinate XL (TOPROL-XL) 50 MG 24 hr tablet Take 1 tablet by mouth Daily.   • [DISCONTINUED] sertraline (ZOLOFT) 50 MG tablet Take 1 tablet by mouth Daily.   • [DISCONTINUED] sotalol (BETAPACE) 80 MG tablet Take 1 tablet by mouth 2 (Two) Times a Day.     No current facility-administered medications on file prior to visit.       Vital Signs:   /88 (BP Location: Right arm, Patient Position: Sitting)    "Pulse 89   Temp 97.9 °F (36.6 °C) (Oral)   Ht 172.7 cm (67.99\")   Wt 95.7 kg (211 lb)   BMI 32.09 kg/m²       Physical Exam:  Physical Exam  Vitals and nursing note reviewed.   Constitutional:       General: He is not in acute distress.     Appearance: He is not ill-appearing.   HENT:      Right Ear: Tympanic membrane and ear canal normal.      Left Ear: Tympanic membrane and ear canal normal.      Mouth/Throat:      Mouth: Mucous membranes are moist.      Comments: Pharynx appears normal  Eyes:      Extraocular Movements: Extraocular movements intact.      Pupils: Pupils are equal, round, and reactive to light.   Neck:      Thyroid: No thyromegaly.   Cardiovascular:      Rate and Rhythm: Normal rate and regular rhythm.      Heart sounds: No murmur heard.  Pulmonary:      Effort: Pulmonary effort is normal.      Breath sounds: Normal breath sounds.   Abdominal:      General: There is no distension.      Palpations: Abdomen is soft. There is no mass.      Tenderness: There is no abdominal tenderness.   Musculoskeletal:      Cervical back: Normal range of motion.   Skin:     Findings: No lesion or rash.   Neurological:      General: No focal deficit present.      Mental Status: He is oriented to person, place, and time.      Cranial Nerves: No cranial nerve deficit.   Psychiatric:         Mood and Affect: Mood normal.         Result Review      The following data was reviewed by Arik Franco MD on 01/23/2023.  Lab Results   Component Value Date    WBC 9.20 07/22/2022    HGB 16.0 07/22/2022    HCT 48.1 07/22/2022    MCV 95.8 07/22/2022     07/22/2022     Lab Results   Component Value Date    GLUCOSE 157 (H) 07/22/2022    BUN 18 07/22/2022    CREATININE 0.73 (L) 07/22/2022     07/22/2022    K 4.6 07/22/2022     07/22/2022    CO2 24.0 07/22/2022    CALCIUM 9.1 07/22/2022    PROTEINTOT 6.8 07/22/2022    ALBUMIN 4.40 07/22/2022    ALT 28 07/22/2022    AST 17 07/22/2022    ALKPHOS 92 " 07/22/2022    BILITOT 0.6 07/22/2022    EGFRIFNONA 123 01/04/2022    GLOB 2.4 07/22/2022    AGRATIO 1.8 07/22/2022    BCR 24.7 07/22/2022    ANIONGAP 11.0 07/22/2022      Lab Results   Component Value Date    CHOL 119 07/22/2022    CHLPL 147 05/27/2021    TRIG 70 07/22/2022    HDL 35 (L) 07/22/2022    LDL 69 07/22/2022     Lab Results   Component Value Date    TSH 1.590 07/22/2022     Lab Results   Component Value Date    HGBA1C 7.8 10/25/2022            Assessment and Plan:   Today, we have reviewed his care.  We need to update testing for Ayden and see where his problem stand.  We will go ahead and refill needed medications and update labs.  It has been sometime since he has seen cardiology, and I will recommend referral for follow-up.  It is a little unusual to be on both metoprolol and sotalol at the same time, and I would like their guidance on this.  We will plan to see him back in about 6 months tentatively.       Diagnoses and all orders for this visit:    1. Physical exam (Primary)    2. Type 2 diabetes mellitus with hyperglycemia, without long-term current use of insulin (HCC)  -     insulin degludec (TRESIBA FLEXTOUCH) 100 UNIT/ML solution pen-injector injection; Inject 40 Units under the skin into the appropriate area as directed Daily.  Dispense: 36 mL; Refill: 3  -     TSH; Future  -     Hemoglobin A1c; Future    3. Mixed hyperlipidemia  -     atorvastatin (LIPITOR) 10 MG tablet; Take 1 tablet by mouth Daily.  Dispense: 90 tablet; Refill: 1  -     TSH; Future    4. Longstanding persistent atrial fibrillation (HCC)  -     Eliquis 5 MG tablet tablet; Take 1 tablet by mouth 2 (Two) Times a Day.  Dispense: 180 tablet; Refill: 3  -     metoprolol succinate XL (TOPROL-XL) 50 MG 24 hr tablet; Take 1 tablet by mouth Daily.  Dispense: 90 tablet; Refill: 3  -     sotalol (BETAPACE) 80 MG tablet; Take 1 tablet by mouth 2 (Two) Times a Day.  Dispense: 180 tablet; Refill: 1  -     Ambulatory Referral to  Cardiology  -     CBC (No Diff); Future    5. Essential hypertension  -     benazepril (LOTENSIN) 40 MG tablet; Take 1 tablet by mouth Daily.  Dispense: 90 tablet; Refill: 1  -     metoprolol succinate XL (TOPROL-XL) 50 MG 24 hr tablet; Take 1 tablet by mouth Daily.  Dispense: 90 tablet; Refill: 3  -     Comprehensive Metabolic Panel; Future    6. Type 2 diabetes mellitus without complication, without long-term current use of insulin (HCC)  -     Glyxambi 25-5 MG tablet; Take 1 tablet by mouth Daily.  Dispense: 90 tablet; Refill: 1  -     metFORMIN ER (GLUCOPHAGE-XR) 500 MG 24 hr tablet; Take 2 tablets by mouth Daily.  Dispense: 180 tablet; Refill: 1    7. Generalized anxiety disorder  -     sertraline (ZOLOFT) 50 MG tablet; Take 1 tablet by mouth Daily.  Dispense: 90 tablet; Refill: 1    8. Encounter for immunization  -     Pneumococcal Conjugate Vaccine 20-Valent (PCV20)  -     COVID-19 Bivalent Booster (Pfizer) 12+yrs      Ayden Murillo  reports that he has been smoking cigarettes. He has been smoking an average of 1 pack per day. He has never used smokeless tobacco.. I have educated him on the risk of diseases from using tobacco products such as cancer, COPD and heart disease.     I advised him to quit and he is not willing to quit.    I spent 3  minutes counseling the patient.    Follow Up   Return in about 6 months (around 7/23/2023) for Recheck.  Patient was given instructions and counseling regarding his condition or for health maintenance advice. Please see specific information pulled into the AVS if appropriate.

## 2023-01-24 ENCOUNTER — TELEPHONE (OUTPATIENT)
Dept: FAMILY MEDICINE CLINIC | Age: 48
End: 2023-01-24
Payer: COMMERCIAL

## 2023-01-24 DIAGNOSIS — E11.65 TYPE 2 DIABETES MELLITUS WITH HYPERGLYCEMIA, WITHOUT LONG-TERM CURRENT USE OF INSULIN: Primary | ICD-10-CM

## 2023-01-24 NOTE — TELEPHONE ENCOUNTER
Caller: Ayden Murillo    Relationship to patient: Self    Best call back number: [phone] OKAY TO LEAVE MESSAGE ON PHONE    Patient is needing: PATIENT CALLED IN AND IS REQUESTING A CALL BACK REGARDING TEST RESULTS AND THE STATUS OF HIS NOTE RECOMMENDING MEDICAL MARIJUANA

## 2023-01-24 NOTE — TELEPHONE ENCOUNTER
----- Message from Arik Franco MD sent at 2023  6:44 AM EST -----  Please let Ayden know the following regarding his testin.  His diabetes is uncontrolled.  His A1c is 9.2% which is up from 7.8% on the most recent check.  This means that his average blood sugar over the last 90 days has been just below 220.  This is increasing the potential risk for complications from type 2 diabetes including damage to the retina, kidneys, and nerves in the feet.  It is also potentially increasing the risk of heart and other cardiovascular disease.  Since we have not been able to get good control, I would advise referral to endocrinology.  We need to do our best to help him gain long-lasting control of his diabetes.  Please refer to Aziza Brock or endocrinology of choice.  2.  The white blood count and hemoglobin levels are mildly elevated.  This is of uncertain significance.  It may be in part related to smoking.  I am not advising any immediate change related to this, but we will plan to recheck the blood counts at follow-up later in the year.    Let me know if he has other concerns, and forward him a copy of this testing if he would like.  Thanks.

## 2023-01-25 NOTE — TELEPHONE ENCOUNTER
Please let Ayden know that I have completed the letter he requested and forwarded to him over [employer].  It is simply a verification of the diagnosis of arthritis.  That is the extent of my role in this.  If he decides to move ahead with legal purchase of medical marijuana outside of [place], he would need to read through the governor's order and comply with it.  Thanks.

## 2023-03-14 ENCOUNTER — TELEPHONE (OUTPATIENT)
Dept: FAMILY MEDICINE CLINIC | Age: 48
End: 2023-03-14

## 2023-03-14 DIAGNOSIS — E11.65 TYPE 2 DIABETES MELLITUS WITH HYPERGLYCEMIA, WITH LONG-TERM CURRENT USE OF INSULIN: Primary | ICD-10-CM

## 2023-03-14 DIAGNOSIS — Z79.4 TYPE 2 DIABETES MELLITUS WITH HYPERGLYCEMIA, WITH LONG-TERM CURRENT USE OF INSULIN: Primary | ICD-10-CM

## 2023-03-14 RX ORDER — FLASH GLUCOSE SENSOR
1 KIT MISCELLANEOUS DAILY
Qty: 2 EACH | Refills: 2 | Status: CANCELLED | OUTPATIENT
Start: 2023-03-14

## 2023-03-14 NOTE — TELEPHONE ENCOUNTER
Caller: Ayden Murillo    Relationship: Self    Best call back number: [phone]    What is the best time to reach you: ANYTIME    Who are you requesting to speak with (clinical staff, provider,  specific staff member): CLINICAL    What was the call regarding: THE PATIENT STATED HE WOULD LIKE A PRESCRIPTION FOR A FREESTYLE GILLIAN SENT TO [facility]. HE WOULD LIKE A CALL BACK TO CONFIRM    Do you require a callback: YES

## 2023-03-15 RX ORDER — BLOOD-GLUCOSE SENSOR
1 EACH MISCELLANEOUS CONTINUOUS PRN
Qty: 2 EACH | Refills: 11 | Status: SHIPPED | OUTPATIENT
Start: 2023-03-15 | End: 2023-03-28 | Stop reason: SDUPTHER

## 2023-03-15 RX ORDER — FLASH GLUCOSE SCANNING READER
1 EACH MISCELLANEOUS DAILY
Qty: 2 EACH | Refills: 2 | Status: SHIPPED | OUTPATIENT
Start: 2023-03-15 | End: 2023-03-28 | Stop reason: SDUPTHER

## 2023-03-28 ENCOUNTER — TELEPHONE (OUTPATIENT)
Dept: FAMILY MEDICINE CLINIC | Age: 48
End: 2023-03-28
Payer: COMMERCIAL

## 2023-03-28 DIAGNOSIS — E11.65 TYPE 2 DIABETES MELLITUS WITH HYPERGLYCEMIA, WITH LONG-TERM CURRENT USE OF INSULIN: ICD-10-CM

## 2023-03-28 DIAGNOSIS — Z79.4 TYPE 2 DIABETES MELLITUS WITH HYPERGLYCEMIA, WITH LONG-TERM CURRENT USE OF INSULIN: ICD-10-CM

## 2023-03-28 RX ORDER — FLASH GLUCOSE SCANNING READER
1 EACH MISCELLANEOUS DAILY
Qty: 2 EACH | Refills: 2 | Status: SHIPPED | OUTPATIENT
Start: 2023-03-28

## 2023-03-28 RX ORDER — BLOOD-GLUCOSE SENSOR
1 EACH MISCELLANEOUS CONTINUOUS PRN
Qty: 2 EACH | Refills: 11 | Status: SHIPPED | OUTPATIENT
Start: 2023-03-28

## 2023-07-24 ENCOUNTER — LAB (OUTPATIENT)
Dept: LAB | Facility: HOSPITAL | Age: 48
End: 2023-07-24
Payer: COMMERCIAL

## 2023-07-24 ENCOUNTER — OFFICE VISIT (OUTPATIENT)
Dept: FAMILY MEDICINE CLINIC | Age: 48
End: 2023-07-24
Payer: COMMERCIAL

## 2023-07-24 VITALS
BODY MASS INDEX: 32.43 KG/M2 | HEIGHT: 68 IN | WEIGHT: 214 LBS | TEMPERATURE: 98 F | HEART RATE: 107 BPM | SYSTOLIC BLOOD PRESSURE: 112 MMHG | DIASTOLIC BLOOD PRESSURE: 80 MMHG

## 2023-07-24 DIAGNOSIS — F41.1 GENERALIZED ANXIETY DISORDER: ICD-10-CM

## 2023-07-24 DIAGNOSIS — E11.65 TYPE 2 DIABETES MELLITUS WITH HYPERGLYCEMIA, WITHOUT LONG-TERM CURRENT USE OF INSULIN: ICD-10-CM

## 2023-07-24 DIAGNOSIS — I10 ESSENTIAL HYPERTENSION: ICD-10-CM

## 2023-07-24 DIAGNOSIS — E78.2 MIXED HYPERLIPIDEMIA: ICD-10-CM

## 2023-07-24 DIAGNOSIS — E11.65 TYPE 2 DIABETES MELLITUS WITH HYPERGLYCEMIA, WITHOUT LONG-TERM CURRENT USE OF INSULIN: Primary | ICD-10-CM

## 2023-07-24 DIAGNOSIS — I48.11 LONGSTANDING PERSISTENT ATRIAL FIBRILLATION: ICD-10-CM

## 2023-07-24 LAB
ALBUMIN SERPL-MCNC: 4.4 G/DL (ref 3.5–5.2)
ALBUMIN UR-MCNC: <1.2 MG/DL
ALBUMIN/GLOB SERPL: 1.6 G/DL
ALP SERPL-CCNC: 101 U/L (ref 39–117)
ALT SERPL W P-5'-P-CCNC: 28 U/L (ref 1–41)
ANION GAP SERPL CALCULATED.3IONS-SCNC: 12.2 MMOL/L (ref 5–15)
AST SERPL-CCNC: 19 U/L (ref 1–40)
BACTERIA UR QL AUTO: NORMAL /HPF
BILIRUB SERPL-MCNC: 0.6 MG/DL (ref 0–1.2)
BILIRUB UR QL STRIP: NEGATIVE
BUN SERPL-MCNC: 13 MG/DL (ref 6–20)
BUN/CREAT SERPL: 16.7 (ref 7–25)
CALCIUM SPEC-SCNC: 9.5 MG/DL (ref 8.6–10.5)
CHLORIDE SERPL-SCNC: 104 MMOL/L (ref 98–107)
CHOLEST SERPL-MCNC: 139 MG/DL (ref 0–200)
CLARITY UR: CLEAR
CO2 SERPL-SCNC: 23.8 MMOL/L (ref 22–29)
COLOR UR: YELLOW
CREAT SERPL-MCNC: 0.78 MG/DL (ref 0.76–1.27)
DEPRECATED RDW RBC AUTO: 43.7 FL (ref 37–54)
EGFRCR SERPLBLD CKD-EPI 2021: 109.3 ML/MIN/1.73
ERYTHROCYTE [DISTWIDTH] IN BLOOD BY AUTOMATED COUNT: 12.3 % (ref 12.3–15.4)
GLOBULIN UR ELPH-MCNC: 2.8 GM/DL
GLUCOSE SERPL-MCNC: 170 MG/DL (ref 65–99)
GLUCOSE UR STRIP-MCNC: ABNORMAL MG/DL
HBA1C MFR BLD: 9.6 % (ref 4.8–5.6)
HCT VFR BLD AUTO: 50.3 % (ref 37.5–51)
HDLC SERPL-MCNC: 31 MG/DL (ref 40–60)
HGB BLD-MCNC: 16.9 G/DL (ref 13–17.7)
HGB UR QL STRIP.AUTO: ABNORMAL
KETONES UR QL STRIP: NEGATIVE
LDLC SERPL CALC-MCNC: 93 MG/DL (ref 0–100)
LDLC/HDLC SERPL: 3.01 {RATIO}
LEUKOCYTE ESTERASE UR QL STRIP.AUTO: NEGATIVE
MCH RBC QN AUTO: 32.2 PG (ref 26.6–33)
MCHC RBC AUTO-ENTMCNC: 33.6 G/DL (ref 31.5–35.7)
MCV RBC AUTO: 95.8 FL (ref 79–97)
NITRITE UR QL STRIP: NEGATIVE
PH UR STRIP.AUTO: 5.5 [PH] (ref 5–8)
PLATELET # BLD AUTO: 217 10*3/MM3 (ref 140–450)
PMV BLD AUTO: 10 FL (ref 6–12)
POTASSIUM SERPL-SCNC: 4.5 MMOL/L (ref 3.5–5.2)
PROT SERPL-MCNC: 7.2 G/DL (ref 6–8.5)
PROT UR QL STRIP: NEGATIVE
RBC # BLD AUTO: 5.25 10*6/MM3 (ref 4.14–5.8)
RBC # UR STRIP: NORMAL /HPF
REF LAB TEST METHOD: NORMAL
SODIUM SERPL-SCNC: 140 MMOL/L (ref 136–145)
SP GR UR STRIP: 1.02 (ref 1–1.03)
SQUAMOUS #/AREA URNS HPF: NORMAL /HPF
TRIGL SERPL-MCNC: 74 MG/DL (ref 0–150)
UROBILINOGEN UR QL STRIP: ABNORMAL
VLDLC SERPL-MCNC: 15 MG/DL (ref 5–40)
WBC # UR STRIP: NORMAL /HPF
WBC NRBC COR # BLD: 9.72 10*3/MM3 (ref 3.4–10.8)

## 2023-07-24 PROCEDURE — 36415 COLL VENOUS BLD VENIPUNCTURE: CPT

## 2023-07-24 PROCEDURE — 81001 URINALYSIS AUTO W/SCOPE: CPT

## 2023-07-24 PROCEDURE — 83036 HEMOGLOBIN GLYCOSYLATED A1C: CPT

## 2023-07-24 PROCEDURE — 80053 COMPREHEN METABOLIC PANEL: CPT

## 2023-07-24 PROCEDURE — 85027 COMPLETE CBC AUTOMATED: CPT

## 2023-07-24 PROCEDURE — 82043 UR ALBUMIN QUANTITATIVE: CPT

## 2023-07-24 PROCEDURE — 99214 OFFICE O/P EST MOD 30 MIN: CPT | Performed by: FAMILY MEDICINE

## 2023-07-24 PROCEDURE — 80061 LIPID PANEL: CPT

## 2023-07-24 RX ORDER — METFORMIN HYDROCHLORIDE 500 MG/1
1000 TABLET, EXTENDED RELEASE ORAL DAILY
Qty: 180 TABLET | Refills: 3 | Status: SHIPPED | OUTPATIENT
Start: 2023-07-24

## 2023-07-24 RX ORDER — APIXABAN 5 MG/1
5 TABLET, FILM COATED ORAL 2 TIMES DAILY
Qty: 180 TABLET | Refills: 3 | Status: SHIPPED | OUTPATIENT
Start: 2023-07-24

## 2023-07-24 RX ORDER — METOPROLOL SUCCINATE 50 MG/1
50 TABLET, EXTENDED RELEASE ORAL DAILY
Qty: 90 TABLET | Refills: 3 | Status: SHIPPED | OUTPATIENT
Start: 2023-07-24

## 2023-07-24 RX ORDER — BENAZEPRIL HYDROCHLORIDE 40 MG/1
40 TABLET, FILM COATED ORAL DAILY
Qty: 90 TABLET | Refills: 3 | Status: SHIPPED | OUTPATIENT
Start: 2023-07-24

## 2023-07-24 RX ORDER — EMPAGLIFLOZIN AND LINAGLIPTIN 25; 5 MG/1; MG/1
1 TABLET, FILM COATED ORAL DAILY
Qty: 90 TABLET | Refills: 3 | Status: SHIPPED | OUTPATIENT
Start: 2023-07-24

## 2023-07-24 RX ORDER — SILDENAFIL 100 MG/1
TABLET, FILM COATED ORAL
Qty: 30 TABLET | Refills: 2 | Status: SHIPPED | OUTPATIENT
Start: 2023-07-24

## 2023-07-24 RX ORDER — ATORVASTATIN CALCIUM 10 MG/1
10 TABLET, FILM COATED ORAL DAILY
Qty: 90 TABLET | Refills: 3 | Status: SHIPPED | OUTPATIENT
Start: 2023-07-24

## 2023-07-24 NOTE — PROGRESS NOTES
Ayden Murillo presents to [facility].    Chief Complaint:  Diabetes, blood pressure, cholesterol    Subjective        History of Present Illness:  Ayden is being seen today for follow-up on his care.  He has type 2 diabetes and remains on treatments as outlined below.  His current dose of Tresiba is 40 units daily.  He states his blood sugar has been running 120 and 150 fasting.  He does have issues with the sugar going up significantly after eating though.  We did make referral to see endocrinology for additional guidance for his diabetes, but he had to cancel an appointment.  He does have follow-up scheduled, but that is about 6 weeks off still.    Ayden also has hypertension and elevated cholesterol.  He remains on treatment for these issues as noted below.    Review of Systems:  Review of Systems   Constitutional:  Negative for chills and fever.   Respiratory:  Negative for cough and shortness of breath.    Cardiovascular:  Negative for chest pain and palpitations.   Gastrointestinal:  Negative for abdominal pain, nausea and vomiting.      Objective   Medical History:  Past Medical History:    Asthma    Atrial fibrillation    Essential hypertension    Mixed hyperlipidemia    Type 2 diabetes mellitus     Past Surgical History:    ANKLE SURGERY    OTHER SURGICAL HISTORY    Cyst removal from neck near spine      Family History   Problem Relation Age of Onset    Hypertension Mother     Asthma Mother     Dementia Mother     Hypertension Brother      Social History     Tobacco Use    Smoking status: Every Day     Packs/day: 1.00     Types: Cigarettes    Smokeless tobacco: Never    Tobacco comments:     Started smoking around age 19.   Substance Use Topics    Alcohol use: Yes     Comment: 'hardly any anymore'       Health Maintenance Due   Topic Date Due    Hepatitis B (1 of 3 - 3-dose series) Never done    TDAP/TD VACCINES (1 - Tdap) Never done    DIABETIC EYE EXAM  Never done     LIPID PANEL  07/22/2023    URINE MICROALBUMIN  07/22/2023    HEMOGLOBIN A1C  07/23/2023        Immunization History   Administered Date(s) Administered    COVID-19 (JIM) 05/01/2021    COVID-19 (PFIZER) BIVALENT 12+YRS 01/23/2023    COVID-19 (PFIZER) Purple Cap Monovalent 01/04/2022    FluLaval/Fluzone >6mos 01/04/2022, 12/15/2022    Pneumococcal Conjugate 20-Valent (PCV20) 01/23/2023    Pneumococcal Polysaccharide (PPSV23) 12/13/2013       No Known Allergies     Medications:  Current Outpatient Medications on File Prior to Visit   Medication Sig    cetirizine (zyrTEC) 10 MG tablet Take 1 tablet by mouth Daily.    Continuous Blood Gluc  (ugichemyle Mc 14 Day [employer]) device 1 each Daily.    Continuous Blood Gluc Sensor (TripnaryStyle Mc 3 Sensor) misc 1 each Continuous As Needed (glucose readings). 2 sensors/month NDC# [address]-54    cyclobenzaprine (FLEXERIL) 10 MG tablet Take 1 tablet by mouth Daily As Needed.    insulin degludec (TRESIBA FLEXTOUCH) 100 UNIT/ML solution pen-injector injection Inject 40 Units under the skin into the appropriate area as directed Daily.    sotalol (BETAPACE) 80 MG tablet Take 1 tablet by mouth 2 (Two) Times a Day.    [DISCONTINUED] atorvastatin (LIPITOR) 10 MG tablet Take 1 tablet by mouth once daily    [DISCONTINUED] benazepril (LOTENSIN) 40 MG tablet Take 1 tablet by mouth Daily.    [DISCONTINUED] Eliquis 5 MG tablet tablet Take 1 tablet by mouth 2 (Two) Times a Day.    [DISCONTINUED] Glyxambi 25-5 MG tablet Take 1 tablet by mouth Daily.    [DISCONTINUED] metFORMIN ER (GLUCOPHAGE-XR) 500 MG 24 hr tablet Take 2 tablets by mouth Daily.    [DISCONTINUED] metoprolol succinate XL (TOPROL-XL) 50 MG 24 hr tablet Take 1 tablet by mouth Daily.    [DISCONTINUED] sertraline (ZOLOFT) 50 MG tablet Take 1 tablet by mouth Daily.     No current facility-administered medications on file prior to visit.       Vital Signs:   /80 (BP Location: Left arm, Patient Position: Sitting)    "Pulse 107   Temp 98 °F (36.7 °C) (Oral)   Ht 172.7 cm (67.99\")   Wt 97.1 kg (214 lb)   BMI 32.55 kg/m²       Physical Exam:  Physical Exam  Vitals and nursing note reviewed.   Constitutional:       General: He is not in acute distress.     Appearance: He is not ill-appearing.   Eyes:      Extraocular Movements: Extraocular movements intact.      Pupils: Pupils are equal, round, and reactive to light.   Neck:      Thyroid: No thyromegaly.   Cardiovascular:      Rate and Rhythm: Normal rate and regular rhythm.      Heart sounds: No murmur heard.  Pulmonary:      Effort: Pulmonary effort is normal.      Breath sounds: Normal breath sounds.   Abdominal:      General: There is no distension.      Palpations: Abdomen is soft. There is no mass.      Tenderness: There is no abdominal tenderness.   Musculoskeletal:      Cervical back: Normal range of motion.   Skin:     Findings: No lesion or rash.   Neurological:      General: No focal deficit present.      Mental Status: He is oriented to person, place, and time.      Cranial Nerves: No cranial nerve deficit.   Psychiatric:         Mood and Affect: Mood normal.       Result Review      The following data was reviewed by Arik Franco MD on 07/24/2023.  Lab Results   Component Value Date    WBC 11.17 (H) 01/23/2023    HGB 18.2 (H) 01/23/2023    HCT 53.9 (H) 01/23/2023    MCV 95.2 01/23/2023     01/23/2023     Lab Results   Component Value Date    GLUCOSE 141 (H) 01/23/2023    BUN 12 01/23/2023    CREATININE 0.81 01/23/2023     01/23/2023    K 4.9 01/23/2023     01/23/2023    CO2 25.6 01/23/2023    CALCIUM 9.2 01/23/2023    PROTEINTOT 7.4 01/23/2023    ALBUMIN 4.4 01/23/2023    ALT 27 01/23/2023    AST 25 01/23/2023    ALKPHOS 102 01/23/2023    BILITOT 0.5 01/23/2023    EGFR 108.8 01/23/2023    GLOB 3.0 01/23/2023    AGRATIO 1.5 01/23/2023    BCR 14.8 01/23/2023    ANIONGAP 11.4 01/23/2023      Lab Results   Component Value Date    CHOL 119 " 07/22/2022    CHLPL 147 05/27/2021    TRIG 70 07/22/2022    HDL 35 (L) 07/22/2022    LDL 69 07/22/2022     Lab Results   Component Value Date    TSH 2.410 01/23/2023     Lab Results   Component Value Date    HGBA1C 9.20 (H) 01/23/2023            Assessment and Plan:   Today, we have reviewed his care.  Overall, Ayden seems to be doing well.  Regarding type 2 diabetes, we will move ahead with updated labs and then consider how to move forward with his insulin.  He does have scheduled follow-up with endocrinology again in about 6 weeks, and I likely not to make changes between now and then.  His other medications will be refilled as noted.  We will [facility]       Diagnoses and all orders for this visit:    1. Type 2 diabetes mellitus with hyperglycemia, without long-term current use of insulin (Primary)  -     Glyxambi 25-5 MG tablet; Take 1 tablet by mouth Daily.  Dispense: 90 tablet; Refill: 3  -     metFORMIN ER (GLUCOPHAGE-XR) 500 MG 24 hr tablet; Take 2 tablets by mouth Daily.  Dispense: 180 tablet; Refill: 3  -     MicroAlbumin, Urine, Random - Urine, Clean Catch; Future  -     Hemoglobin A1c; Future  -     Urinalysis With Microscopic - Urine, Clean Catch; Future    2. Mixed hyperlipidemia  -     atorvastatin (LIPITOR) 10 MG tablet; Take 1 tablet by mouth Daily.  Dispense: 90 tablet; Refill: 3  -     Lipid Panel; Future    3. Essential hypertension  -     benazepril (LOTENSIN) 40 MG tablet; Take 1 tablet by mouth Daily.  Dispense: 90 tablet; Refill: 3  -     metoprolol succinate XL (TOPROL-XL) 50 MG 24 hr tablet; Take 1 tablet by mouth Daily.  Dispense: 90 tablet; Refill: 3  -     Comprehensive Metabolic Panel; Future  -     Urinalysis With Microscopic - Urine, Clean Catch; Future    4. Longstanding persistent atrial fibrillation  -     Eliquis 5 MG tablet tablet; Take 1 tablet by mouth 2 (Two) Times a Day.  Dispense: 180 tablet; Refill: 3  -     metoprolol succinate XL  (TOPROL-XL) 50 MG 24 hr tablet; Take 1 tablet by mouth Daily.  Dispense: 90 tablet; Refill: 3  -     Comprehensive Metabolic Panel; Future  -     CBC (No Diff); Future    5. Generalized anxiety disorder  -     sertraline (ZOLOFT) 50 MG tablet; Take 1 tablet by mouth Daily.  Dispense: 90 tablet; Refill: 3    Other orders  -     sildenafil (VIAGRA) 100 MG tablet; Take 0.5 to 1 tablet approximately 1 hour prior to sex as needed  Dispense: 30 tablet; Refill: 2    Follow Up   Return in about 6 months (around 1/24/2024) for Recheck.  Patient was given instructions and counseling regarding his condition or for health maintenance advice. Please see specific information pulled into the AVS if appropriate.

## 2023-09-07 NOTE — PROGRESS NOTES
Chief Complaint  Diabetes (New pt, est care, med mgt, A1c eval, cgm eval )    Referred By: Arik Franco, *    Subjective          Ayden Kal Lidia presents to [facility] for diabetes medication management    History of Present Illness    Visit type:  to establish care  Diabetes type:  Type 2  Age at time of dx/Year of dx/Number of years:  2013  Family History of Diabetes: mother and brother  Current diabetes status/concerns/issues: He has been referred to our office by his primary care provider for assistance in managing his diabetes.  The patient has had persistent elevated A1c despite changes in medications.  Other current health concerns: arthritis and some stress  Current Diabetes symptoms:    Polyuria: No   Polydipsia: No   Polyphagia: No   Blurred vision: No   Excessive fatigue: No  Known Diabetes complications:  Neuropathy: None; Location: N/A  Renal: None  Eyes: None; Location: N/A; Last Eye Exam:  greater than 3 years ; Location: N/A  Amputation/Wounds: None  GI: None  Cardiovascular: Hypertension and Hyperlipidemia  ED: Patient Reported  Other: None  Hospitalizations/ED/911 secondary to DM?  No  Hypoglycemia:  None reported at this time  Hypoglycemia Symptoms:  No hypoglycemia at this time  Current Diabetes treatment:  Glyxambi 25-5 mg once a day, Tresiba 40 units once a day, metformin  mg 2 tablets once a day  Prior diabetes treatments:  Jardiance, Invokamet  Using ACEI or ARB: Yes, benazepril, Managed by other provider  Using Statin: Yes, atorvastatin, Managed by other provider  Blood glucose device:  Mc CGM  Blood glucose monitoring frequency:  Continuous per CGM  Blood glucose range/average:  The 14-day sensor report shows an average glucose of 255mg/dL, with 18% in target range ( mgdL), 31% in the high range (181-250 mg/dL), 51% in the very high range (>250 mg/dL), 0% in the low range (54-70 mg/dL) and 0% in the very low range (<54 mg/dL).  "  Glucose Source: Device Reviewed  Dietary behavior:  \"Eat what I want\"/No diet plan, Number of meals each day - 2-3; Number of snacks each day - 1, He does drink one small sugary drink a day.  He gets migraines with sugar substitutes  Activity/Exercise:   he is active with work  Last Foot Exam:  per PCP  Diabetes Education Hx: None  Social Determinants of Health: Financial concerns; he tries to be cautious with the cost of his medications    Past Medical History:   Diagnosis Date    Asthma     Atrial fibrillation     Essential hypertension     Mixed hyperlipidemia     Type 2 diabetes mellitus      Past Surgical History:   Procedure Laterality Date    ANKLE SURGERY Left     OTHER SURGICAL HISTORY      Cyst removal from neck near spine     Family History   Problem Relation Age of Onset    Diabetes Mother     Hypertension Mother     Asthma Mother     Dementia Mother     Diabetes Brother     Hypertension Brother      Social History     Socioeconomic History    Marital status:     Number of children: 0   Tobacco Use    Smoking status: Every Day     Packs/day: 1.00     Types: Cigarettes    Smokeless tobacco: Never    Tobacco comments:     Started smoking around age 19.   Vaping Use    Vaping Use: Some days   Substance and Sexual Activity    Alcohol use: Yes     Comment: 'hardly any anymore'     No Known Allergies    Current Outpatient Medications:     atorvastatin (LIPITOR) 10 MG tablet, Take 1 tablet by mouth Daily., Disp: 90 tablet, Rfl: 3    benazepril (LOTENSIN) 40 MG tablet, Take 1 tablet by mouth Daily., Disp: 90 tablet, Rfl: 3    cetirizine (zyrTEC) 10 MG tablet, Take 1 tablet by mouth Daily., Disp: , Rfl:     Continuous Blood Gluc Sensor (FreeStyle Mc 3 Sensor) misc, 1 each Continuous As Needed (glucose readings). 2 sensors/month NDC# [address]-34, Disp: 2 each, Rfl: 11    cyclobenzaprine (FLEXERIL) 10 MG tablet, Take 1 tablet by mouth Daily As Needed., Disp: , Rfl:     Eliquis 5 MG tablet tablet, " "Take 1 tablet by mouth 2 (Two) Times a Day., Disp: 180 tablet, Rfl: 3    Glyxambi 25-5 MG tablet, Take 1 tablet by mouth Daily., Disp: 90 tablet, Rfl: 3    insulin degludec (TRESIBA FLEXTOUCH) 100 UNIT/ML solution pen-injector injection, Inject 40 Units under the skin into the appropriate area as directed Daily., Disp: 36 mL, Rfl: 3    metFORMIN ER (GLUCOPHAGE-XR) 500 MG 24 hr tablet, Take 2 tablets by mouth Daily., Disp: 180 tablet, Rfl: 3    metoprolol succinate XL (TOPROL-XL) 50 MG 24 hr tablet, Take 1 tablet by mouth Daily., Disp: 90 tablet, Rfl: 3    sertraline (ZOLOFT) 50 MG tablet, Take 1 tablet by mouth Daily., Disp: 90 tablet, Rfl: 3    sildenafil (VIAGRA) 100 MG tablet, Take 0.5 to 1 tablet approximately 1 hour prior to sex as needed, Disp: 30 tablet, Rfl: 2    sotalol (BETAPACE) 80 MG tablet, Take 1 tablet by mouth 2 (Two) Times a Day., Disp: 180 tablet, Rfl: 1    Semaglutide,0.25 or 0.5MG/DOS, (Ozempic, 0.25 or 0.5 MG/DOSE,) 2 MG/3ML solution pen-injector, Inject 0.5 mg under the skin into the appropriate area as directed 1 (One) Time Per Week., Disp: 3 mL, Rfl: 5    Objective     Vitals:    09/08/23 1515   BP: 128/87   BP Location: Right arm   Patient Position: Sitting   Cuff Size: Adult   Pulse: 95   SpO2: 97%   Weight: 95.7 kg (211 lb)   Height: 172.7 cm (68\")   PainSc:   4     Body mass index is 32.08 kg/m².    Physical Exam  Constitutional:       Appearance: Normal appearance. He is obese.      Comments: Obesity (BMI 30 - 39.9) Pt Current BMI = 32.08    HENT:      Head: Normocephalic and atraumatic.      Right Ear: External ear normal.      Left Ear: External ear normal.      Nose: Nose normal.   Eyes:      Extraocular Movements: Extraocular movements intact.      Conjunctiva/sclera: Conjunctivae normal.   Pulmonary:      Effort: Pulmonary effort is normal.   Musculoskeletal:         General: Normal range of motion.      Cervical back: Normal range of motion.   Skin:     General: Skin is warm and " dry.   Neurological:      General: No focal deficit present.      Mental Status: He is alert and oriented to person, place, and time. Mental status is at baseline.   Psychiatric:         Mood and Affect: Mood normal.         Behavior: Behavior normal.         Thought Content: Thought content normal.         Judgment: Judgment normal.           Result Review :   The following data was reviewed by: JENISE Pacheco on 09/08/2023:    Most Recent A1C          9/8/2023    15:22   HGBA1C Most Recent   Hemoglobin A1C 9.3        A1C Last 3 Results          1/23/2023    12:05 7/24/2023    11:11 9/8/2023    15:22   HGBA1C Last 3 Results   Hemoglobin A1C 9.20  9.60  9.3      A1c collected in the office today is 9.3%, indicating Uncontrolled Type II diabetes.  This result is down from the prior result of 9.6% collected on 7/24/23       Creatinine   Date Value Ref Range Status   07/24/2023 0.78 0.76 - 1.27 mg/dL Final   01/23/2023 0.81 0.76 - 1.27 mg/dL Final     eGFR   Date Value Ref Range Status   07/24/2023 109.3 >60.0 mL/min/1.73 Final   01/23/2023 108.8 >60.0 mL/min/1.73 Final     Labs collected on 7/24/2023 show Normal values    Microalbumin, Urine   Date Value Ref Range Status   07/24/2023 <1.2 mg/dL Final   07/22/2022 <1.2 mg/dL Final     Urine microalbuminuria collected on 7/24/2023 is negative for microalbuminuria    Total Cholesterol   Date Value Ref Range Status   07/24/2023 139 0 - 200 mg/dL Final   07/22/2022 119 0 - 200 mg/dL Final     Triglycerides   Date Value Ref Range Status   07/24/2023 74 0 - 150 mg/dL Final   07/22/2022 70 0 - 150 mg/dL Final     HDL Cholesterol   Date Value Ref Range Status   07/24/2023 31 (L) 40 - 60 mg/dL Final   07/22/2022 35 (L) 40 - 60 mg/dL Final     LDL Cholesterol    Date Value Ref Range Status   07/24/2023 93 0 - 100 mg/dL Final   07/22/2022 69 0 - 100 mg/dL Final     Lipid panel collected on 7/24/2023 shows normal lipid panel and low HDL            Assessment: Patient's  A1c is uncontrolled.  The CGM does show significant hyperglycemia.  He is having some hyperglycemia after meals.  Some glucose levels are well above 350 mg/dL      Diagnoses and all orders for this visit:    1. Uncontrolled type 2 diabetes mellitus with hyperglycemia, with long-term current use of insulin (Primary)  -     POC Glycosylated Hemoglobin (Hb A1C)  -     Semaglutide,0.25 or 0.5MG/DOS, (Ozempic, 0.25 or 0.5 MG/DOSE,) 2 MG/3ML solution pen-injector; Inject 0.5 mg under the skin into the appropriate area as directed 1 (One) Time Per Week.  Dispense: 3 mL; Refill: 5    2. Uses self-applied continuous glucose monitoring device    3. Obesity (BMI 30-39.9)        Plan: He will continue taking his current diabetes medications as prescribed.  We will add Ozempic with a starting dose of 0.25 mg once weekly x4 doses then he will increase to 0.5 mg once weekly thereafter.  If he has any difficulties tolerating medication he will contact our office.  He is to focus on dietary strategies and physical activity to help promote glucose control and possible weight loss as well.    The patient will monitor his blood glucose levels using his continuous glucose sensor.  If he develops problematic hyperglycemia or hypoglycemia or adverse drug reactions, he will contact the office for further instructions.        Follow Up     Return in about 3 months (around 12/8/2023) for Medication Management.    Patient was given instructions and counseling regarding his condition or for health maintenance advice. Please see specific information pulled into the AVS if appropriate.     Aziza Brock, JENISE  09/08/2023      Dictated Utilizing Dragon Dictation.  Please note that portions of this note were completed with a voice recognition program.  Part of this note may be an electronic transcription/translation of spoken language to printed text using the Dragon Dictation System.

## 2023-09-08 ENCOUNTER — OFFICE VISIT (OUTPATIENT)
Dept: DIABETES SERVICES | Facility: CLINIC | Age: 48
End: 2023-09-08
Payer: COMMERCIAL

## 2023-09-08 VITALS
SYSTOLIC BLOOD PRESSURE: 128 MMHG | HEART RATE: 95 BPM | BODY MASS INDEX: 31.98 KG/M2 | WEIGHT: 211 LBS | OXYGEN SATURATION: 97 % | DIASTOLIC BLOOD PRESSURE: 87 MMHG | HEIGHT: 68 IN

## 2023-09-08 DIAGNOSIS — E11.65 UNCONTROLLED TYPE 2 DIABETES MELLITUS WITH HYPERGLYCEMIA, WITH LONG-TERM CURRENT USE OF INSULIN: Primary | ICD-10-CM

## 2023-09-08 DIAGNOSIS — E66.9 OBESITY (BMI 30-39.9): ICD-10-CM

## 2023-09-08 DIAGNOSIS — Z79.4 UNCONTROLLED TYPE 2 DIABETES MELLITUS WITH HYPERGLYCEMIA, WITH LONG-TERM CURRENT USE OF INSULIN: Primary | ICD-10-CM

## 2023-09-08 DIAGNOSIS — Z97.8 USES SELF-APPLIED CONTINUOUS GLUCOSE MONITORING DEVICE: ICD-10-CM

## 2023-09-08 LAB
EXPIRATION DATE: ABNORMAL
HBA1C MFR BLD: 9.3 %
Lab: ABNORMAL

## 2023-09-08 PROCEDURE — 99204 OFFICE O/P NEW MOD 45 MIN: CPT | Performed by: NURSE PRACTITIONER

## 2023-09-08 PROCEDURE — 95251 CONT GLUC MNTR ANALYSIS I&R: CPT | Performed by: NURSE PRACTITIONER

## 2023-09-08 RX ORDER — SEMAGLUTIDE 0.68 MG/ML
0.5 INJECTION, SOLUTION SUBCUTANEOUS WEEKLY
Qty: 3 ML | Refills: 5 | Status: SHIPPED | OUTPATIENT
Start: 2023-09-08

## 2023-09-08 NOTE — LETTER
September 8, 2023     Patient: Ayden Murillo   YOB: 1975   Date of Visit: 9/8/2023       To Whom It May Concern:    This note is to verify that the above patient was seen in the [facility] for an office appointment on 9/8/23.           Sincerely,        This document has been signed by JENISE FAJARDO ON September 8, 2023 16:09 EDT      JENISE Pacheco

## 2023-09-08 NOTE — PATIENT INSTRUCTIONS
Begin taking Ozempic 2.5 mg once weekly x4 doses on the same day of each week.  If tolerated, increase the dose to 0.5 mg once weekly and remain on this dose.  If you have complications with this medication, contact the office.  Watch for excessive nausea with vomiting, abdominal pain, or significant diarrhea or constipation.  Should these occur, contact the office.    Continue taking your other diabetes medications as previously prescribed.

## 2023-09-29 DIAGNOSIS — I48.11 LONGSTANDING PERSISTENT ATRIAL FIBRILLATION: ICD-10-CM

## 2023-09-29 RX ORDER — SOTALOL HYDROCHLORIDE 80 MG/1
80 TABLET ORAL 2 TIMES DAILY
Qty: 180 TABLET | Refills: 0 | OUTPATIENT
Start: 2023-09-29

## 2023-09-29 NOTE — TELEPHONE ENCOUNTER
This is a type of medication that cardiology would normally fill.  I do not see that we have refilled it recently.  I would recommend he reach out to his cardiologist regarding this.  If we need to refill for 30 days though, please make it so.  I do not want him to run out of it.  Thanks

## 2023-10-24 ENCOUNTER — TELEPHONE (OUTPATIENT)
Dept: DIABETES SERVICES | Facility: HOSPITAL | Age: 48
End: 2023-10-24
Payer: COMMERCIAL

## 2023-10-24 DIAGNOSIS — Z79.4 UNCONTROLLED TYPE 2 DIABETES MELLITUS WITH HYPERGLYCEMIA, WITH LONG-TERM CURRENT USE OF INSULIN: ICD-10-CM

## 2023-10-24 DIAGNOSIS — E11.65 UNCONTROLLED TYPE 2 DIABETES MELLITUS WITH HYPERGLYCEMIA, WITH LONG-TERM CURRENT USE OF INSULIN: ICD-10-CM

## 2023-10-24 NOTE — TELEPHONE ENCOUNTER
Hub staff attempted to follow warm transfer process and was unsuccessful     Caller: Ayden Murillo    Relationship to patient: Self    Best call back number: [phone]     Patient is needing: PT CALLING STATING THE PHARMACY WONT REFILL THEIR OZEMPIC SINCE PT ALSO TAKES GLYXAMBI.  PT IS ASKING IF THERE IS ANYTHING WE CAN DO TO HELP WITH THIS, PLEASE CHECK AND ADVISE.

## 2023-10-24 NOTE — TELEPHONE ENCOUNTER
Patient called and stated that pharmacy will not cover ozempic due to patient also being on Glyxambi, patient stated after calling [employer] insurance the clinical line will need to call and state that this is not a duplicate for it to be filled again. Patient stated the call back number is [phone] to call to state it is okay for him to be on both.

## 2023-11-03 RX ORDER — SEMAGLUTIDE 0.68 MG/ML
0.5 INJECTION, SOLUTION SUBCUTANEOUS WEEKLY
Qty: 3 ML | Refills: 5 | Status: SHIPPED | OUTPATIENT
Start: 2023-11-03

## 2023-11-06 ENCOUNTER — TELEPHONE (OUTPATIENT)
Dept: DIABETES SERVICES | Facility: HOSPITAL | Age: 48
End: 2023-11-06
Payer: COMMERCIAL

## 2023-11-06 NOTE — TELEPHONE ENCOUNTER
Ayden Murillo (Payton: [id number])  Rx #: [id number]  Ozempic (0.25 or 0.5 MG/DOSE) 2MG/3ML pen-injectors      Message from Plan  PA Case: [id number], Status: Approved, Coverage Starts on: 1/1/2023 12:00:00 AM, Coverage Ends on: 12/31/2024 12:00:00 AM. Questions? Contact [phone].

## 2023-11-09 NOTE — PROGRESS NOTES
Chief Complaint  Diabetes (Follow up, med mgt, A1c eval, cgm eval )    Referred By: No ref. provider found    Subjective          Ayden Murillo presents to [facility] for diabetes medication management    History of Present Illness    Visit type:  follow-up  Diabetes type:  Type 2  Current diabetes status/concerns/issues:  He tolerated the Ozempic with some mild nausea.  He took the initial 4 doses of 0.25 and then 2 doses of 0.5 mg but then his insurance would not refill it because he was simultaneously receiving Glyxambi.  He has been without the Ozempic since he completed those 6 doses.  He states he did notice his glucose levels were better while he was on the medication  Other health concerns: none reported  Current Diabetes symptoms:    Polyuria: No   Polydipsia: No   Polyphagia: No   Blurred vision: No   Excessive fatigue: No  Known Diabetes complications:  Neuropathy: None; Location: N/A  Renal: None  Eyes: None; Location: N/A; Last Eye Exam:  greater than 3 years ; Location: N/A  Amputation/Wounds: None  GI: None  Cardiovascular: Hypertension and Hyperlipidemia  ED: Patient Reported  Other: None  Hypoglycemia:  None reported at this time  Hypoglycemia Symptoms:  No hypoglycemia at this time  Current diabetes treatment:  Glyxambi 25-5 mg once a day, Tresiba 40 units once a day, metformin  mg 2 tablets once a day.  Ozempic 0.5 mg once weekly was added at the last appointment but he has been without this for the last 8 weeks or so   Blood glucose device:  [employer] CGM  Blood glucose monitoring frequency:  Continuous per CGM  Blood glucose range/average:  The 14-day sensor report shows an average glucose of 214 mg/dL, with 32% in target range ( mgdL), 41% in the high range (181-250 mg/dL), 27% in the very high range (>250 mg/dL), 0% in the low range (54-70 mg/dL) and 0% in the very low range (<54 mg/dL).   Glucose Source: Device Reviewed  Diet:  Limits high  carb/sweet foods, Avoids sugary drinks  Activity/Exercise:   active with work    Past Medical History:   Diagnosis Date    Asthma     Atrial fibrillation     Essential hypertension     Mixed hyperlipidemia     Type 2 diabetes mellitus      Past Surgical History:   Procedure Laterality Date    ANKLE SURGERY Left     OTHER SURGICAL HISTORY      Cyst removal from neck near spine     Family History   Problem Relation Age of Onset    Diabetes Mother     Hypertension Mother     Asthma Mother     Dementia Mother     Diabetes Brother     Hypertension Brother      Social History     Socioeconomic History    Marital status:     Number of children: 0   Tobacco Use    Smoking status: Every Day     Packs/day: 1     Types: Cigarettes    Smokeless tobacco: Never    Tobacco comments:     Started smoking around age 19.   Vaping Use    Vaping Use: Some days   Substance and Sexual Activity    Alcohol use: Yes     Comment: 'hardly any anymore'     No Known Allergies    Current Outpatient Medications:     atorvastatin (LIPITOR) 10 MG tablet, Take 1 tablet by mouth Daily., Disp: 90 tablet, Rfl: 3    benazepril (LOTENSIN) 40 MG tablet, Take 1 tablet by mouth Daily., Disp: 90 tablet, Rfl: 3    cetirizine (zyrTEC) 10 MG tablet, Take 1 tablet by mouth Daily., Disp: , Rfl:     Continuous Blood Gluc Sensor (FreeStyle Mc 3 Sensor) misc, 1 each Continuous As Needed (glucose readings). 2 sensors/month NDC# [address]-66, Disp: 2 each, Rfl: 11    cyclobenzaprine (FLEXERIL) 10 MG tablet, Take 1 tablet by mouth Daily As Needed., Disp: , Rfl:     Eliquis 5 MG tablet tablet, Take 1 tablet by mouth 2 (Two) Times a Day., Disp: 180 tablet, Rfl: 3    insulin degludec (TRESIBA FLEXTOUCH) 100 UNIT/ML solution pen-injector injection, Inject 40 Units under the skin into the appropriate area as directed Daily., Disp: 36 mL, Rfl: 3    metFORMIN ER (GLUCOPHAGE-XR) 500 MG 24 hr tablet, Take 2 tablets by mouth Daily., Disp: 180 tablet, Rfl: 3     "metoprolol succinate XL (TOPROL-XL) 50 MG 24 hr tablet, Take 1 tablet by mouth Daily., Disp: 90 tablet, Rfl: 3    Semaglutide,0.25 or 0.5MG/DOS, (Ozempic, 0.25 or 0.5 MG/DOSE,) 2 MG/3ML solution pen-injector, Inject 0.5 mg under the skin into the appropriate area as directed 1 (One) Time Per Week., Disp: 3 mL, Rfl: 5    sertraline (ZOLOFT) 50 MG tablet, Take 1 tablet by mouth Daily., Disp: 90 tablet, Rfl: 3    sildenafil (VIAGRA) 100 MG tablet, Take 0.5 to 1 tablet approximately 1 hour prior to sex as needed, Disp: 30 tablet, Rfl: 2    sotalol (BETAPACE) 80 MG tablet, Take 1 tablet by mouth 2 (Two) Times a Day., Disp: 180 tablet, Rfl: 1    empagliflozin (Jardiance) 25 MG tablet tablet, Take 1 tablet by mouth Daily for 180 days., Disp: 90 tablet, Rfl: 1    Objective     Vitals:    11/10/23 1504   BP: 112/83   BP Location: Right arm   Patient Position: Sitting   Cuff Size: Adult   Pulse: 91   SpO2: 99%   Weight: 96.6 kg (213 lb)   Height: 172.7 cm (68\")   PainSc:   5     Body mass index is 32.39 kg/m².    Physical Exam  Constitutional:       Appearance: Normal appearance. He is obese.      Comments: Obesity (BMI 30 - 39.9) Pt Current BMI = 32.39     HENT:      Head: Normocephalic and atraumatic.      Right Ear: External ear normal.      Left Ear: External ear normal.      Nose: Nose normal.   Eyes:      Extraocular Movements: Extraocular movements intact.      Conjunctiva/sclera: Conjunctivae normal.   Pulmonary:      Effort: Pulmonary effort is normal.   Musculoskeletal:         General: Normal range of motion.      Cervical back: Normal range of motion.   Skin:     General: Skin is warm and dry.   Neurological:      General: No focal deficit present.      Mental Status: He is alert and oriented to person, place, and time. Mental status is at baseline.   Psychiatric:         Mood and Affect: Mood normal.         Behavior: Behavior normal.         Thought Content: Thought content normal.         Judgment: Judgment " normal.             Result Review :   The following data was reviewed by: JENISE Pacheco on 11/10/2023:    Most Recent A1C          11/10/2023    15:07   HGBA1C Most Recent   Hemoglobin A1C 8.6        A1C Last 3 Results          7/24/2023    11:11 9/8/2023    15:22 11/10/2023    15:07   HGBA1C Last 3 Results   Hemoglobin A1C 9.60  9.3  8.6      A1c collected in the office today is 8.6%, indicating Uncontrolled Type II diabetes.  This result is down from the prior result of 9.3% collected on 9/8/23               Assessment: He has had improvement in his A1c but remains above target.  Unfortunately the patient only was able to take the Ozempic for 6 doses because his insurance would not refill it because he was still on Glyxambi.      Diagnoses and all orders for this visit:    1. Uncontrolled type 2 diabetes mellitus with hyperglycemia, with long-term current use of insulin (Primary)  -     POC Glycosylated Hemoglobin (Hb A1C)  -     empagliflozin (Jardiance) 25 MG tablet tablet; Take 1 tablet by mouth Daily for 180 days.  Dispense: 90 tablet; Refill: 1    2. Uses self-applied continuous glucose monitoring device    3. Obesity (BMI 30-39.9)        Plan: We will discontinue the Glyxambi and start the patient on Jardiance 25 mg once a day.  The patient will restart the Ozempic at 0.5 mg once weekly.  He was advised that he can take a couple doses of 0.25 to make sure that his stomach will tolerate the restart of the medication.  No other changes were made to his treatment plan at this time.    The patient will monitor his blood glucose levels 1-2 times each day.  If he develops problematic hyperglycemia or hypoglycemia or adverse drug reactions, he will contact the office for further instructions.        Follow Up     Return in about 3 months (around 2/10/2024) for Medication Management.    Patient was given instructions and counseling regarding his condition or for health maintenance advice. Please see  specific information pulled into the AVS if appropriate.     Aziza Brock, APRN  11/10/2023      Dictated Utilizing Dragon Dictation.  Please note that portions of this note were completed with a voice recognition program.  Part of this note may be an electronic transcription/translation of spoken language to printed text using the Dragon Dictation System.

## 2023-11-10 ENCOUNTER — OFFICE VISIT (OUTPATIENT)
Dept: DIABETES SERVICES | Facility: CLINIC | Age: 48
End: 2023-11-10
Payer: COMMERCIAL

## 2023-11-10 VITALS
HEART RATE: 91 BPM | SYSTOLIC BLOOD PRESSURE: 112 MMHG | OXYGEN SATURATION: 99 % | DIASTOLIC BLOOD PRESSURE: 83 MMHG | WEIGHT: 213 LBS | HEIGHT: 68 IN | BODY MASS INDEX: 32.28 KG/M2

## 2023-11-10 DIAGNOSIS — E11.65 UNCONTROLLED TYPE 2 DIABETES MELLITUS WITH HYPERGLYCEMIA, WITH LONG-TERM CURRENT USE OF INSULIN: Primary | ICD-10-CM

## 2023-11-10 DIAGNOSIS — Z97.8 USES SELF-APPLIED CONTINUOUS GLUCOSE MONITORING DEVICE: ICD-10-CM

## 2023-11-10 DIAGNOSIS — Z79.4 UNCONTROLLED TYPE 2 DIABETES MELLITUS WITH HYPERGLYCEMIA, WITH LONG-TERM CURRENT USE OF INSULIN: Primary | ICD-10-CM

## 2023-11-10 DIAGNOSIS — E66.9 OBESITY (BMI 30-39.9): ICD-10-CM

## 2023-11-10 LAB
EXPIRATION DATE: ABNORMAL
HBA1C MFR BLD: 8.6 % (ref 4.5–5.7)
Lab: ABNORMAL

## 2023-11-10 PROCEDURE — 99214 OFFICE O/P EST MOD 30 MIN: CPT | Performed by: NURSE PRACTITIONER

## 2023-11-10 PROCEDURE — 95251 CONT GLUC MNTR ANALYSIS I&R: CPT | Performed by: NURSE PRACTITIONER

## 2023-11-10 NOTE — PATIENT INSTRUCTIONS
Stop the Glyxambi    Start Jardiance 25 mg once a day    Continue taking your Tresiba 40 units once a day, metformin  mg 2 tablets once a day    Restart the Ozempic.  You may take 0.25 mg x 2 doses and then go up to the 0.5 mg dose afterwards and remain on this dose until your follow-up appointment

## 2024-01-02 DIAGNOSIS — E11.65 TYPE 2 DIABETES MELLITUS WITH HYPERGLYCEMIA, WITHOUT LONG-TERM CURRENT USE OF INSULIN: ICD-10-CM

## 2024-01-18 ENCOUNTER — TELEPHONE (OUTPATIENT)
Dept: DIABETES SERVICES | Facility: HOSPITAL | Age: 49
End: 2024-01-18
Payer: COMMERCIAL

## 2024-01-18 NOTE — TELEPHONE ENCOUNTER
Ayden Murillo (Payton: [id number])  Rx #: [id number]  Ozempic (0.25 or 0.5 MG/DOSE) 2MG/3ML pen-injectors

## 2024-01-22 ENCOUNTER — LAB (OUTPATIENT)
Dept: LAB | Facility: HOSPITAL | Age: 49
End: 2024-01-22
Payer: COMMERCIAL

## 2024-01-22 ENCOUNTER — OFFICE VISIT (OUTPATIENT)
Dept: FAMILY MEDICINE CLINIC | Age: 49
End: 2024-01-22
Payer: COMMERCIAL

## 2024-01-22 VITALS
WEIGHT: 211.2 LBS | DIASTOLIC BLOOD PRESSURE: 82 MMHG | HEART RATE: 105 BPM | HEIGHT: 68 IN | TEMPERATURE: 97.9 F | SYSTOLIC BLOOD PRESSURE: 117 MMHG | BODY MASS INDEX: 32.01 KG/M2

## 2024-01-22 DIAGNOSIS — E11.65 TYPE 2 DIABETES MELLITUS WITH HYPERGLYCEMIA, WITH LONG-TERM CURRENT USE OF INSULIN: Primary | ICD-10-CM

## 2024-01-22 DIAGNOSIS — I10 ESSENTIAL HYPERTENSION: ICD-10-CM

## 2024-01-22 DIAGNOSIS — Z23 ENCOUNTER FOR IMMUNIZATION: ICD-10-CM

## 2024-01-22 DIAGNOSIS — E78.2 MIXED HYPERLIPIDEMIA: ICD-10-CM

## 2024-01-22 DIAGNOSIS — F41.1 GENERALIZED ANXIETY DISORDER: ICD-10-CM

## 2024-01-22 DIAGNOSIS — I48.11 LONGSTANDING PERSISTENT ATRIAL FIBRILLATION: ICD-10-CM

## 2024-01-22 DIAGNOSIS — M15.9 GENERALIZED OSTEOARTHRITIS: ICD-10-CM

## 2024-01-22 DIAGNOSIS — E66.09 CLASS 1 OBESITY DUE TO EXCESS CALORIES WITH SERIOUS COMORBIDITY AND BODY MASS INDEX (BMI) OF 32.0 TO 32.9 IN ADULT: ICD-10-CM

## 2024-01-22 DIAGNOSIS — Z79.4 TYPE 2 DIABETES MELLITUS WITH HYPERGLYCEMIA, WITH LONG-TERM CURRENT USE OF INSULIN: Primary | ICD-10-CM

## 2024-01-22 DIAGNOSIS — E11.65 TYPE 2 DIABETES MELLITUS WITH HYPERGLYCEMIA, WITH LONG-TERM CURRENT USE OF INSULIN: ICD-10-CM

## 2024-01-22 DIAGNOSIS — Z79.4 TYPE 2 DIABETES MELLITUS WITH HYPERGLYCEMIA, WITH LONG-TERM CURRENT USE OF INSULIN: ICD-10-CM

## 2024-01-22 PROBLEM — E66.811 CLASS 1 OBESITY DUE TO EXCESS CALORIES WITH SERIOUS COMORBIDITY AND BODY MASS INDEX (BMI) OF 32.0 TO 32.9 IN ADULT: Status: ACTIVE | Noted: 2024-01-22

## 2024-01-22 LAB
ALBUMIN SERPL-MCNC: 4.4 G/DL (ref 3.5–5.2)
ALBUMIN/GLOB SERPL: 1.5 G/DL
ALP SERPL-CCNC: 111 U/L (ref 39–117)
ALT SERPL W P-5'-P-CCNC: 22 U/L (ref 1–41)
ANION GAP SERPL CALCULATED.3IONS-SCNC: 11.5 MMOL/L (ref 5–15)
AST SERPL-CCNC: 17 U/L (ref 1–40)
BILIRUB SERPL-MCNC: 0.4 MG/DL (ref 0–1.2)
BUN SERPL-MCNC: 11 MG/DL (ref 6–20)
BUN/CREAT SERPL: 14.5 (ref 7–25)
CALCIUM SPEC-SCNC: 9.8 MG/DL (ref 8.6–10.5)
CHLORIDE SERPL-SCNC: 100 MMOL/L (ref 98–107)
CO2 SERPL-SCNC: 27.5 MMOL/L (ref 22–29)
CREAT SERPL-MCNC: 0.76 MG/DL (ref 0.76–1.27)
DEPRECATED RDW RBC AUTO: 44.2 FL (ref 37–54)
EGFRCR SERPLBLD CKD-EPI 2021: 110.2 ML/MIN/1.73
ERYTHROCYTE [DISTWIDTH] IN BLOOD BY AUTOMATED COUNT: 12.1 % (ref 12.3–15.4)
GLOBULIN UR ELPH-MCNC: 3 GM/DL
GLUCOSE SERPL-MCNC: 177 MG/DL (ref 65–99)
HCT VFR BLD AUTO: 53.9 % (ref 37.5–51)
HGB BLD-MCNC: 17.6 G/DL (ref 13–17.7)
MCH RBC QN AUTO: 31.8 PG (ref 26.6–33)
MCHC RBC AUTO-ENTMCNC: 32.7 G/DL (ref 31.5–35.7)
MCV RBC AUTO: 97.5 FL (ref 79–97)
PLATELET # BLD AUTO: 248 10*3/MM3 (ref 140–450)
PMV BLD AUTO: 9.8 FL (ref 6–12)
POTASSIUM SERPL-SCNC: 4.9 MMOL/L (ref 3.5–5.2)
PROT SERPL-MCNC: 7.4 G/DL (ref 6–8.5)
RBC # BLD AUTO: 5.53 10*6/MM3 (ref 4.14–5.8)
SODIUM SERPL-SCNC: 139 MMOL/L (ref 136–145)
TSH SERPL DL<=0.05 MIU/L-ACNC: 3.64 UIU/ML (ref 0.27–4.2)
WBC NRBC COR # BLD AUTO: 10.29 10*3/MM3 (ref 3.4–10.8)

## 2024-01-22 PROCEDURE — 36415 COLL VENOUS BLD VENIPUNCTURE: CPT

## 2024-01-22 PROCEDURE — 90471 IMMUNIZATION ADMIN: CPT | Performed by: FAMILY MEDICINE

## 2024-01-22 PROCEDURE — 90480 ADMN SARSCOV2 VAC 1/ONLY CMP: CPT | Performed by: FAMILY MEDICINE

## 2024-01-22 PROCEDURE — 90686 IIV4 VACC NO PRSV 0.5 ML IM: CPT | Performed by: FAMILY MEDICINE

## 2024-01-22 PROCEDURE — 99214 OFFICE O/P EST MOD 30 MIN: CPT | Performed by: FAMILY MEDICINE

## 2024-01-22 PROCEDURE — 80050 GENERAL HEALTH PANEL: CPT

## 2024-01-22 PROCEDURE — 91320 SARSCV2 VAC 30MCG TRS-SUC IM: CPT | Performed by: FAMILY MEDICINE

## 2024-01-22 RX ORDER — METFORMIN HYDROCHLORIDE 500 MG/1
1000 TABLET, EXTENDED RELEASE ORAL DAILY
Qty: 180 TABLET | Refills: 3 | Status: SHIPPED | OUTPATIENT
Start: 2024-01-22

## 2024-01-22 RX ORDER — SILDENAFIL 100 MG/1
TABLET, FILM COATED ORAL
Qty: 30 TABLET | Refills: 2 | Status: SHIPPED | OUTPATIENT
Start: 2024-01-22

## 2024-01-22 RX ORDER — BENAZEPRIL HYDROCHLORIDE 40 MG/1
40 TABLET ORAL DAILY
Qty: 90 TABLET | Refills: 3 | Status: SHIPPED | OUTPATIENT
Start: 2024-01-22

## 2024-01-22 RX ORDER — METOPROLOL SUCCINATE 50 MG/1
50 TABLET, EXTENDED RELEASE ORAL DAILY
Qty: 90 TABLET | Refills: 3 | Status: SHIPPED | OUTPATIENT
Start: 2024-01-22

## 2024-01-22 RX ORDER — APIXABAN 5 MG/1
5 TABLET, FILM COATED ORAL 2 TIMES DAILY
Qty: 180 TABLET | Refills: 3 | Status: SHIPPED | OUTPATIENT
Start: 2024-01-22

## 2024-01-22 RX ORDER — ATORVASTATIN CALCIUM 10 MG/1
10 TABLET, FILM COATED ORAL DAILY
Qty: 90 TABLET | Refills: 3 | Status: SHIPPED | OUTPATIENT
Start: 2024-01-22

## 2024-01-22 RX ORDER — SOTALOL HYDROCHLORIDE 80 MG/1
80 TABLET ORAL 2 TIMES DAILY
Qty: 180 TABLET | Refills: 3 | Status: SHIPPED | OUTPATIENT
Start: 2024-01-22

## 2024-01-22 RX ORDER — CYCLOBENZAPRINE HCL 10 MG
10 TABLET ORAL DAILY PRN
Qty: 40 TABLET | Refills: 1 | Status: SHIPPED | OUTPATIENT
Start: 2024-01-22

## 2024-01-22 NOTE — PROGRESS NOTES
Ayden Murillo presents to [facility].    Chief Complaint:  Diabetes, blood pressure, cholesterol, atrial fibrillation    Subjective   History of Present Illness:  Ayden is being seen today for follow-up on his care.  He has type 2 diabetes for which he remains on Jardiance, metformin, Ozempic, and Tresiba.  His current dose of Tresiba is 40 units once daily.  His sugar is usually around 130 in the mornings.  It does go up significantly when he eats though.    He also has hypertension, elevated cholesterol, and atrial fibrillation.  Ayden continues on treatment for these issues.  He is due for repeat blood work today.  His blood pressure is well-controlled here.    Review of Systems:  Review of Systems   Constitutional:  Negative for chills and fever.   Respiratory:  Negative for cough and shortness of breath.    Cardiovascular:  Negative for chest pain and palpitations.   Gastrointestinal:  Positive for GERD and indigestion (a little bit). Negative for abdominal pain, nausea and vomiting.      Objective   Medical History:  Past Medical History:    Allergic    Arthritis    Asthma    Atrial fibrillation    Erectile dysfunction    Essential hypertension    Heart murmur    HL (hearing loss)    Mixed hyperlipidemia    Type 2 diabetes mellitus     Past Surgical History:    ANKLE SURGERY    FRACTURE SURGERY    OTHER SURGICAL HISTORY    Cyst removal from neck near spine    SPINE SURGERY      Family History   Problem Relation Age of Onset    Diabetes Mother     Hypertension Mother     Asthma Mother     Dementia Mother     Diabetes Brother     Hypertension Brother      Social History     Tobacco Use    Smoking status: Every Day     Packs/day: 1.00     Years: 20.00     Additional pack years: 0.00     Total pack years: 20.00     Types: Cigarettes    Smokeless tobacco: Never    Tobacco comments:     Started smoking around age 19.   Substance Use Topics    Alcohol use: Not Currently      Comment: 'hardly any anymore'       Health Maintenance Due   Topic Date Due    Hepatitis B (1 of 3 - 3-dose series) Never done    TDAP/TD VACCINES (1 - Tdap) Never done    DIABETIC EYE EXAM  Never done        Immunization History   Administered Date(s) Administered    COVID-19 (JIM) 05/01/2021    COVID-19 (PFIZER) BIVALENT 12+YRS 01/23/2023    COVID-19 (PFIZER) Purple Cap Monovalent 01/04/2022    COVID-19 F23 (PFIZER) 12YRS+ (COMIRNATY) 01/22/2024    Fluzone (or Fluarix & Flulaval for VFC) >6mos 01/04/2022, 12/15/2022, 01/22/2024    Pneumococcal Conjugate 20-Valent (PCV20) 01/23/2023    Pneumococcal Polysaccharide (PPSV23) 12/13/2013       No Known Allergies     Medications:  Current Outpatient Medications on File Prior to Visit   Medication Sig    cetirizine (zyrTEC) 10 MG tablet Take 1 tablet by mouth Daily.    Continuous Blood Gluc Sensor (Swippyle Mc 3 Sensor) misc 1 each Continuous As Needed (glucose readings). 2 sensors/month NDC# [address]-79    empagliflozin (Jardiance) 25 MG tablet tablet Take 1 tablet by mouth Daily for 180 days.    insulin degludec (TRESIBA FLEXTOUCH) 100 UNIT/ML solution pen-injector injection Inject 40 Units under the skin into the appropriate area as directed Daily.    Semaglutide,0.25 or 0.5MG/DOS, (Ozempic, 0.25 or 0.5 MG/DOSE,) 2 MG/3ML solution pen-injector Inject 0.5 mg under the skin into the appropriate area as directed 1 (One) Time Per Week.    [DISCONTINUED] atorvastatin (LIPITOR) 10 MG tablet Take 1 tablet by mouth Daily.    [DISCONTINUED] benazepril (LOTENSIN) 40 MG tablet Take 1 tablet by mouth Daily.    [DISCONTINUED] cyclobenzaprine (FLEXERIL) 10 MG tablet Take 1 tablet by mouth Daily As Needed.    [DISCONTINUED] Eliquis 5 MG tablet tablet Take 1 tablet by mouth 2 (Two) Times a Day.    [DISCONTINUED] metFORMIN ER (GLUCOPHAGE-XR) 500 MG 24 hr tablet Take 2 tablets by mouth Daily.    [DISCONTINUED] metoprolol succinate XL (TOPROL-XL) 50 MG 24 hr tablet Take 1  "tablet by mouth Daily.    [DISCONTINUED] sertraline (ZOLOFT) 50 MG tablet Take 1 tablet by mouth Daily.    [DISCONTINUED] sildenafil (VIAGRA) 100 MG tablet Take 0.5 to 1 tablet approximately 1 hour prior to sex as needed    [DISCONTINUED] sotalol (BETAPACE) 80 MG tablet Take 1 tablet by mouth 2 (Two) Times a Day.     No current facility-administered medications on file prior to visit.       Vital Signs:   /82 (BP Location: Left arm, Patient Position: Sitting)   Pulse 105   Temp 97.9 °F (36.6 °C) (Oral)   Ht 172.7 cm (67.99\")   Wt 95.8 kg (211 lb 3.2 oz)   BMI 32.12 kg/m²       Physical Exam:  Physical Exam  Vitals reviewed.   Constitutional:       General: He is not in acute distress.     Appearance: He is obese. He is not ill-appearing.   Eyes:      Pupils: Pupils are equal, round, and reactive to light.   Neck:      Comments: No thyromegaly  Cardiovascular:      Rate and Rhythm: Normal rate and regular rhythm.      Pulses:           Dorsalis pedis pulses are 2+ on the right side and 2+ on the left side.   Pulmonary:      Effort: Pulmonary effort is normal.      Breath sounds: Normal breath sounds.   Abdominal:      General: There is no distension.      Palpations: Abdomen is soft.      Tenderness: There is no abdominal tenderness.   Musculoskeletal:      Cervical back: Neck supple.   Feet:      Right foot:      Protective Sensation: 3 sites tested.  3 sites sensed.      Skin integrity: Skin integrity normal.      Left foot:      Protective Sensation: 3 sites tested.  3 sites sensed.      Skin integrity: Skin integrity normal.   Lymphadenopathy:      Cervical: No cervical adenopathy.   Skin:     Findings: No lesion or rash.   Neurological:      Mental Status: He is alert.         Result Review   The following data was reviewed by Arik Franco MD on 01/22/2024.  Lab Results   Component Value Date    WBC 10.29 01/22/2024    HGB 17.6 01/22/2024    HCT 53.9 (H) 01/22/2024    MCV 97.5 (H) 01/22/2024 "     01/22/2024     Lab Results   Component Value Date    GLUCOSE 170 (H) 07/24/2023    BUN 13 07/24/2023    CREATININE 0.78 07/24/2023     07/24/2023    K 4.5 07/24/2023     07/24/2023    CO2 23.8 07/24/2023    CALCIUM 9.5 07/24/2023    PROTEINTOT 7.2 07/24/2023    ALBUMIN 4.4 07/24/2023    ALT 28 07/24/2023    AST 19 07/24/2023    ALKPHOS 101 07/24/2023    BILITOT 0.6 07/24/2023    EGFR 109.3 07/24/2023    GLOB 2.8 07/24/2023    AGRATIO 1.6 07/24/2023    BCR 16.7 07/24/2023    ANIONGAP 12.2 07/24/2023      Lab Results   Component Value Date    CHOL 139 07/24/2023    CHLPL 147 05/27/2021    TRIG 74 07/24/2023    HDL 31 (L) 07/24/2023    LDL 93 07/24/2023     Lab Results   Component Value Date    TSH 2.410 01/23/2023     Lab Results   Component Value Date    HGBA1C 8.6 (A) 11/10/2023     BMI is >= 30 and <35. (Class 1 Obesity). The following options were offered after discussion;: exercise counseling/recommendations and nutrition counseling/recommendations      Assessment and Plan:   Today, we have reviewed his care.  Ayden is doing fairly well overall.  He will be seeing JENISE Astudillo in the next few weeks for diabetes follow-up as well.  For the near term, we will refill needed medications and update labs as noted.  Tentatively, we will plan to see him back in about 6 months.    Diagnoses and all orders for this visit:    1. Type 2 diabetes mellitus with hyperglycemia, with long-term current use of insulin (Primary)  -     metFORMIN ER (GLUCOPHAGE-XR) 500 MG 24 hr tablet; Take 2 tablets by mouth Daily.  Dispense: 180 tablet; Refill: 3  -     Comprehensive Metabolic Panel; Future  -     Ambulatory Referral for Diabetic Eye Exam-Optometry    2. Essential hypertension  -     benazepril (LOTENSIN) 40 MG tablet; Take 1 tablet by mouth Daily.  Dispense: 90 tablet; Refill: 3  -     metoprolol succinate XL (TOPROL-XL) 50 MG 24 hr tablet; Take 1 tablet by mouth Daily.  Dispense: 90 tablet; Refill:  3  -     Comprehensive Metabolic Panel; Future    3. Longstanding persistent atrial fibrillation  -     Eliquis 5 MG tablet tablet; Take 1 tablet by mouth 2 (Two) Times a Day.  Dispense: 180 tablet; Refill: 3  -     metoprolol succinate XL (TOPROL-XL) 50 MG 24 hr tablet; Take 1 tablet by mouth Daily.  Dispense: 90 tablet; Refill: 3  -     sotalol (BETAPACE) 80 MG tablet; Take 1 tablet by mouth 2 (Two) Times a Day.  Dispense: 180 tablet; Refill: 3  -     CBC (No Diff); Future  -     Comprehensive Metabolic Panel; Future    4. Mixed hyperlipidemia  -     atorvastatin (LIPITOR) 10 MG tablet; Take 1 tablet by mouth Daily.  Dispense: 90 tablet; Refill: 3  -     TSH; Future    5. Generalized anxiety disorder  -     sertraline (ZOLOFT) 50 MG tablet; Take 1 tablet by mouth Daily.  Dispense: 90 tablet; Refill: 3    6. Class 1 obesity due to excess calories with serious comorbidity and body mass index (BMI) of 32.0 to 32.9 in adult  -     TSH; Future    7. Generalized osteoarthritis  -     cyclobenzaprine (FLEXERIL) 10 MG tablet; Take 1 tablet by mouth Daily As Needed for Muscle Spasms.  Dispense: 40 tablet; Refill: 1    8. Encounter for immunization  -     Fluzone >6 Months ([id number])  -     COVID-19 F23 (Pfizer) 12yrs+ (COMIRNATY)    Other orders  -     sildenafil (VIAGRA) 100 MG tablet; Take 0.5 to 1 tablet approximately 1 hour prior to sex as needed  Dispense: 30 tablet; Refill: 2    Follow Up  Return in about 6 months (around 7/22/2024) for Recheck.  Patient was given instructions and counseling regarding his condition or for health maintenance advice. Please see specific information pulled into the AVS if appropriate.

## 2024-01-25 ENCOUNTER — TELEPHONE (OUTPATIENT)
Dept: FAMILY MEDICINE CLINIC | Age: 49
End: 2024-01-25
Payer: COMMERCIAL

## 2024-01-25 NOTE — TELEPHONE ENCOUNTER
Caller: Ayden Murillo    Relationship: Self    Best call back number:     [phone]       What form or medical record are you requesting: DOCTOR NOTE 1.23.24    Who is requesting this form or medical record from you: EMPLOYER    How would you like to receive the form or medical records (pick-up, mail, fax):       Timeframe paperwork needed: AS SOON AS POSSIBLE    Additional notes: DUE TO SIDE EFFECTS FROM COVID SHOT.

## 2024-01-30 ENCOUNTER — TELEPHONE (OUTPATIENT)
Dept: DIABETES SERVICES | Facility: HOSPITAL | Age: 49
End: 2024-01-30
Payer: COMMERCIAL

## 2024-01-30 NOTE — TELEPHONE ENCOUNTER
Pt called, left a voicemail asking for information regarding a Prior Authorization. Pt did not leave as to what medication he was calling about, just that he wanted to talk with someone about a prior authorization.   
No

## 2024-02-09 ENCOUNTER — TELEPHONE (OUTPATIENT)
Dept: DIABETES SERVICES | Facility: HOSPITAL | Age: 49
End: 2024-02-09
Payer: COMMERCIAL

## 2024-02-09 NOTE — TELEPHONE ENCOUNTER
Ayden Murillo Payton: [id number] - Rx #: [id number]  Jardiance 25MG tabletsApprovedtoday  PA Case: [id number], Status: Approved, Coverage Starts on: 2/9/2024 12:00:00 AM, Coverage Ends on: 2/8/2025 12:00:00 AM.

## 2024-02-26 DIAGNOSIS — Z79.4 TYPE 2 DIABETES MELLITUS WITH HYPERGLYCEMIA, WITH LONG-TERM CURRENT USE OF INSULIN: ICD-10-CM

## 2024-02-26 DIAGNOSIS — E11.65 TYPE 2 DIABETES MELLITUS WITH HYPERGLYCEMIA, WITH LONG-TERM CURRENT USE OF INSULIN: ICD-10-CM

## 2024-02-26 RX ORDER — BLOOD-GLUCOSE SENSOR
EACH MISCELLANEOUS
Qty: 2 EACH | Refills: 0 | Status: SHIPPED | OUTPATIENT
Start: 2024-02-26

## 2024-03-06 NOTE — PROGRESS NOTES
Chief Complaint  Diabetes (Follow up, med mgt, A1c eval, cgm eval )    Referred By: No ref. provider found    Subjective          Ayden Murillo presents to [facility] for diabetes medication management    History of Present Illness    Visit type:  follow-up  Diabetes type:  Type 2  Current diabetes status/concerns/issues:  He is tolerating the Jardiance without difficulty.  He has lost 9 pounds since his last appointment  Other health concerns: No new health concerns  Current Diabetes symptoms:    Polyuria: No   Polydipsia: No   Polyphagia: No   Blurred vision: No   Excessive fatigue: No  Known Diabetes complications:  Neuropathy: None; Location: N/A  Renal: None  Eyes: No Retinopathy reported on current eye exam; Location: N/A  Amputation/Wounds: None  GI: None  Cardiovascular: Hypertension and Hyperlipidemia  ED: Patient Reported  Other: None  Hypoglycemia:  None reported at this time  Hypoglycemia Symptoms:  No hypoglycemia at this time  Current diabetes treatment:   Tresiba 40 units once a day, metformin  mg 2 tablets once a day.  Ozempic 0.5 mg once weekly.  Jardiance 25 mg once weekly was started at the last appointment  Blood glucose device:  [email or web address] CGM  Blood glucose monitoring frequency:  Continuous per CGM  Blood glucose range/average:  The 14-day sensor report shows an average glucose of 204 mg/dL, with 39% in target range ( mgdL), 38% in the high range (181-250 mg/dL), 23% in the very high range (>250 mg/dL), 0% in the low range (54-70 mg/dL) and 0% in the very low range (<54 mg/dL).   Glucose Source: Device Reviewed  Diet:  Limits high carb/sweet foods, Avoids sugary drinks  Activity/Exercise:  None    Past Medical History:   Diagnosis Date    Allergic 2013    Arthritis 2017    Asthma     Atrial fibrillation     Erectile dysfunction 2022    Essential hypertension     Heart murmur 1974    HL (hearing loss) 1983    Mixed hyperlipidemia     Type 2 diabetes  mellitus      Past Surgical History:   Procedure Laterality Date    ANKLE SURGERY Left     FRACTURE SURGERY  2017    OTHER SURGICAL HISTORY      Cyst removal from neck near spine    SPINE SURGERY  2012     Family History   Problem Relation Age of Onset    Diabetes Mother     Hypertension Mother     Asthma Mother     Dementia Mother     Diabetes Brother     Hypertension Brother      Social History     Socioeconomic History    Marital status:     Number of children: 0   Tobacco Use    Smoking status: Every Day     Current packs/day: 1.00     Average packs/day: 1 pack/day for 20.0 years (20.0 ttl pk-yrs)     Types: Cigarettes    Smokeless tobacco: Never    Tobacco comments:     Started smoking around age 19.   Vaping Use    Vaping status: Some Days   Substance and Sexual Activity    Alcohol use: Not Currently     Comment: 'hardly any anymore'    Drug use: Never    Sexual activity: Not Currently     Partners: Female     No Known Allergies    Current Outpatient Medications:     atorvastatin (LIPITOR) 10 MG tablet, Take 1 tablet by mouth Daily., Disp: 90 tablet, Rfl: 3    benazepril (LOTENSIN) 40 MG tablet, Take 1 tablet by mouth Daily., Disp: 90 tablet, Rfl: 3    cetirizine (zyrTEC) 10 MG tablet, Take 1 tablet by mouth Daily., Disp: , Rfl:     Continuous Blood Gluc Sensor (FreeStyle Mc 3 Sensor) misc, 1 each by Other route Every 14 (Fourteen) Days., Disp: 6 each, Rfl: 1    cyclobenzaprine (FLEXERIL) 10 MG tablet, Take 1 tablet by mouth Daily As Needed for Muscle Spasms., Disp: 40 tablet, Rfl: 1    Eliquis 5 MG tablet tablet, Take 1 tablet by mouth 2 (Two) Times a Day., Disp: 180 tablet, Rfl: 3    empagliflozin (Jardiance) 25 MG tablet tablet, Take 1 tablet by mouth Daily for 180 days., Disp: 90 tablet, Rfl: 1    metFORMIN ER (GLUCOPHAGE-XR) 500 MG 24 hr tablet, Take 2 tablets by mouth Daily., Disp: 180 tablet, Rfl: 3    metoprolol succinate XL (TOPROL-XL) 50 MG 24 hr tablet, Take 1 tablet by mouth Daily.,  "Disp: 90 tablet, Rfl: 3    sertraline (ZOLOFT) 50 MG tablet, Take 1 tablet by mouth Daily., Disp: 90 tablet, Rfl: 3    sildenafil (VIAGRA) 100 MG tablet, Take 0.5 to 1 tablet approximately 1 hour prior to sex as needed, Disp: 30 tablet, Rfl: 2    sotalol (BETAPACE) 80 MG tablet, Take 1 tablet by mouth 2 (Two) Times a Day., Disp: 180 tablet, Rfl: 3    insulin degludec (TRESIBA FLEXTOUCH) 100 UNIT/ML solution pen-injector injection, Inject 40 Units under the skin into the appropriate area as directed Daily., Disp: 36 mL, Rfl: 3    Semaglutide, 1 MG/DOSE, (Ozempic, 1 MG/DOSE,) 4 MG/3ML solution pen-injector, Inject 1 mg under the skin into the appropriate area as directed 1 (One) Time Per Week., Disp: 3 mL, Rfl: 5    Objective     Vitals:    03/08/24 1559   BP: 111/79   BP Location: Right arm   Patient Position: Sitting   Cuff Size: Adult   Pulse: 94   SpO2: 97%   Weight: 92.5 kg (204 lb)   Height: 172.7 cm (68\")   PainSc:   4     Body mass index is 31.02 kg/m².    Physical Exam  Constitutional:       Appearance: Normal appearance. He is obese.      Comments: Obesity (BMI 30 - 39.9) Pt Current BMI = 31.02    HENT:      Head: Normocephalic and atraumatic.      Right Ear: External ear normal.      Left Ear: External ear normal.      Nose: Nose normal.   Eyes:      Extraocular Movements: Extraocular movements intact.      Conjunctiva/sclera: Conjunctivae normal.   Pulmonary:      Effort: Pulmonary effort is normal.   Musculoskeletal:         General: Normal range of motion.      Cervical back: Normal range of motion.   Skin:     General: Skin is warm and dry.   Neurological:      General: No focal deficit present.      Mental Status: He is alert and oriented to person, place, and time. Mental status is at baseline.   Psychiatric:         Mood and Affect: Mood normal.         Behavior: Behavior normal.         Thought Content: Thought content normal.         Judgment: Judgment normal.             Result Review :   The " following data was reviewed by: JENISE Pacheco on 03/08/2024:    Most Recent A1C          3/8/2024    16:02   HGBA1C Most Recent   Hemoglobin A1C 8.1        A1C Last 3 Results          9/8/2023    15:22 11/10/2023    15:07 3/8/2024    16:02   HGBA1C Last 3 Results   Hemoglobin A1C 9.3  8.6  8.1      A1c collected in the office today is 8.1%, indicating Uncontrolled Type II diabetes.  This result is down from the prior result of 8.6% collected on 11/10/23     Creatinine   Date Value Ref Range Status   01/22/2024 0.76 0.76 - 1.27 mg/dL Final   07/24/2023 0.78 0.76 - 1.27 mg/dL Final     eGFR   Date Value Ref Range Status   01/22/2024 110.2 >60.0 mL/min/1.73 Final   07/24/2023 109.3 >60.0 mL/min/1.73 Final     Labs collected on 1/22/2024 show Normal values              Assessment: The patient has had some improvement in his A1c but remains above target.  He has tolerated the addition of the Jardiance that was added at the last appointment.      Diagnoses and all orders for this visit:    1. Uncontrolled type 2 diabetes mellitus with hyperglycemia, with long-term current use of insulin (Primary)  -     POC Glycosylated Hemoglobin (Hb A1C)  -     Continuous Blood Gluc Sensor (FreeStyle Mc 3 Sensor) misc; 1 each by Other route Every 14 (Fourteen) Days.  Dispense: 6 each; Refill: 1  -     Semaglutide, 1 MG/DOSE, (Ozempic, 1 MG/DOSE,) 4 MG/3ML solution pen-injector; Inject 1 mg under the skin into the appropriate area as directed 1 (One) Time Per Week.  Dispense: 3 mL; Refill: 5    2. Uses self-applied continuous glucose monitoring device    3. Obesity (BMI 30-39.9)        Plan: We will have the patient increase the Tresiba to 45 units once a day.  We will also increase the Ozempic to 1 mg once weekly with his next dose.  He is to focus on diet and physical activity to help promote glucose control and possible weight loss.    The patient will monitor his blood glucose levels using the CGM.  If he develops  problematic hyperglycemia or hypoglycemia or adverse drug reactions, he will contact the office for further instructions.        Follow Up     Return in about 3 months (around 6/8/2024) for Medication Management, CGM Follow-up.    Patient was given instructions and counseling regarding his condition or for health maintenance advice. Please see specific information pulled into the AVS if appropriate.     Aziza Brock, JENISE  03/08/2024      Dictated Utilizing Dragon Dictation.  Please note that portions of this note were completed with a voice recognition program.  Part of this note may be an electronic transcription/translation of spoken language to printed text using the Dragon Dictation System.

## 2024-03-08 ENCOUNTER — OFFICE VISIT (OUTPATIENT)
Dept: DIABETES SERVICES | Facility: CLINIC | Age: 49
End: 2024-03-08
Payer: COMMERCIAL

## 2024-03-08 VITALS
DIASTOLIC BLOOD PRESSURE: 79 MMHG | HEIGHT: 68 IN | OXYGEN SATURATION: 97 % | SYSTOLIC BLOOD PRESSURE: 111 MMHG | BODY MASS INDEX: 30.92 KG/M2 | WEIGHT: 204 LBS | HEART RATE: 94 BPM

## 2024-03-08 DIAGNOSIS — Z79.4 UNCONTROLLED TYPE 2 DIABETES MELLITUS WITH HYPERGLYCEMIA, WITH LONG-TERM CURRENT USE OF INSULIN: Primary | ICD-10-CM

## 2024-03-08 DIAGNOSIS — Z97.8 USES SELF-APPLIED CONTINUOUS GLUCOSE MONITORING DEVICE: ICD-10-CM

## 2024-03-08 DIAGNOSIS — E66.9 OBESITY (BMI 30-39.9): ICD-10-CM

## 2024-03-08 DIAGNOSIS — E11.65 UNCONTROLLED TYPE 2 DIABETES MELLITUS WITH HYPERGLYCEMIA, WITH LONG-TERM CURRENT USE OF INSULIN: Primary | ICD-10-CM

## 2024-03-08 LAB
EXPIRATION DATE: ABNORMAL
HBA1C MFR BLD: 8.1 % (ref 4.5–5.7)
Lab: ABNORMAL

## 2024-03-08 PROCEDURE — 99214 OFFICE O/P EST MOD 30 MIN: CPT | Performed by: NURSE PRACTITIONER

## 2024-03-08 PROCEDURE — 95251 CONT GLUC MNTR ANALYSIS I&R: CPT | Performed by: NURSE PRACTITIONER

## 2024-03-08 RX ORDER — BLOOD-GLUCOSE SENSOR
1 EACH MISCELLANEOUS
Qty: 6 EACH | Refills: 1 | Status: SHIPPED | OUTPATIENT
Start: 2024-03-08

## 2024-03-08 RX ORDER — SEMAGLUTIDE 1.34 MG/ML
1 INJECTION, SOLUTION SUBCUTANEOUS WEEKLY
Qty: 3 ML | Refills: 5 | Status: SHIPPED | OUTPATIENT
Start: 2024-03-08

## 2024-03-08 NOTE — LETTER
March 8, 2024     Patient: Ayden Vo   YOB: 1975   Date of Visit: 3/8/2024       To Whom It May Concern:    This letter is to verify that Mr. Ayden vo was seen in the diabetes care clinic in [facility] today         Sincerely,        This document has been signed by JENISE FAJARDO ON March 8, 2024 16:21 EST          JENISE Pacheco    CC: No Recipients

## 2024-03-08 NOTE — PATIENT INSTRUCTIONS
Increase the Tresiba to 45 units once a day.    We will increase your Ozempic to 1 mg once weekly at your next dose.

## 2024-03-19 DIAGNOSIS — E11.65 TYPE 2 DIABETES MELLITUS WITH HYPERGLYCEMIA, WITHOUT LONG-TERM CURRENT USE OF INSULIN: ICD-10-CM

## 2024-05-22 DIAGNOSIS — Z79.4 UNCONTROLLED TYPE 2 DIABETES MELLITUS WITH HYPERGLYCEMIA, WITH LONG-TERM CURRENT USE OF INSULIN: ICD-10-CM

## 2024-05-22 DIAGNOSIS — E11.65 UNCONTROLLED TYPE 2 DIABETES MELLITUS WITH HYPERGLYCEMIA, WITH LONG-TERM CURRENT USE OF INSULIN: ICD-10-CM

## 2024-05-23 RX ORDER — EMPAGLIFLOZIN 25 MG/1
TABLET, FILM COATED ORAL
Qty: 90 TABLET | Refills: 1 | Status: SHIPPED | OUTPATIENT
Start: 2024-05-23

## 2024-07-12 ENCOUNTER — OFFICE VISIT (OUTPATIENT)
Dept: DIABETES SERVICES | Facility: CLINIC | Age: 49
End: 2024-07-12
Payer: COMMERCIAL

## 2024-07-12 VITALS
DIASTOLIC BLOOD PRESSURE: 93 MMHG | SYSTOLIC BLOOD PRESSURE: 127 MMHG | WEIGHT: 197 LBS | HEART RATE: 55 BPM | BODY MASS INDEX: 29.86 KG/M2 | HEIGHT: 68 IN | OXYGEN SATURATION: 98 %

## 2024-07-12 DIAGNOSIS — E11.65 UNCONTROLLED TYPE 2 DIABETES MELLITUS WITH HYPERGLYCEMIA, WITH LONG-TERM CURRENT USE OF INSULIN: Primary | ICD-10-CM

## 2024-07-12 DIAGNOSIS — Z97.8 USES SELF-APPLIED CONTINUOUS GLUCOSE MONITORING DEVICE: ICD-10-CM

## 2024-07-12 DIAGNOSIS — Z79.4 UNCONTROLLED TYPE 2 DIABETES MELLITUS WITH HYPERGLYCEMIA, WITH LONG-TERM CURRENT USE OF INSULIN: Primary | ICD-10-CM

## 2024-07-12 DIAGNOSIS — E66.3 OVERWEIGHT (BMI 25.0-29.9): ICD-10-CM

## 2024-07-12 LAB
EXPIRATION DATE: ABNORMAL
HBA1C MFR BLD: 7.6 % (ref 4.5–5.7)
Lab: ABNORMAL

## 2024-07-12 PROCEDURE — 99214 OFFICE O/P EST MOD 30 MIN: CPT | Performed by: NURSE PRACTITIONER

## 2024-07-12 PROCEDURE — 95251 CONT GLUC MNTR ANALYSIS I&R: CPT | Performed by: NURSE PRACTITIONER

## 2024-07-12 RX ORDER — SEMAGLUTIDE 2.68 MG/ML
2 INJECTION, SOLUTION SUBCUTANEOUS
Qty: 9 ML | Refills: 1 | Status: SHIPPED | OUTPATIENT
Start: 2024-07-12 | End: 2025-01-08

## 2024-07-12 NOTE — PROGRESS NOTES
Chief Complaint  Diabetes (Follow up, med mgt, A1c eval, cgm eval )    Referred By: No ref. provider found    Subjective          Ayden Murillo presents to [facility] for diabetes medication management    History of Present Illness    Visit type:  follow-up  Diabetes type:  Type 2  Current diabetes status/concerns/issues:  The patient's Ozempic was increased to 1 mg once weekly at the last appointment and is tolerating without difficulty.  Other health concerns:  Patient has been experiencing some right thigh pain that he attributes to sciatica  Current Diabetes symptoms:    Polyuria: No   Polydipsia: No   Polyphagia: No   Blurred vision: No   Excessive fatigue: No  Known Diabetes complications:  Neuropathy: None; Location: N/A  Renal: Normal eGFR per current labs  Eyes: No Retinopathy reported on current eye exam; Location: N/A  Amputation/Wounds: None  GI: None  Cardiovascular: Hypertension and Hyperlipidemia  ED: Patient Reported  Other: None  Hypoglycemia:  None reported at this time  Hypoglycemia Symptoms:  No hypoglycemia at this time  Current diabetes treatment:   Tresiba 45 units once a day, metformin  mg 2 tablets once a day.  Ozempic 1 mg once weekly.  Jardiance 25 mg once daily  Blood glucose device:  [employer] CGM  Blood glucose monitoring frequency:  Continuous per CGM  Blood glucose range/average:  The 14-day sensor report shows an average glucose of 192mg/dL, with 47% in target range ( mgdL), 35% in the high range (181-250 mg/dL), 18% in the very high range (>250 mg/dL), 0% in the low range (54-70 mg/dL) and 0% in the very low range (<54 mg/dL).   Glucose Source: Device Reviewed  Diet:  Limits high carb/sweet foods, admits to consuming regular sodas and homemade sweet tea  Activity/Exercise:   busy with work    Past Medical History:   Diagnosis Date    Allergic 2013    Arthritis 2017    Asthma     Atrial fibrillation     Erectile dysfunction 2022     Essential hypertension     Heart murmur 1974    HL (hearing loss) 1983    Mixed hyperlipidemia     Type 2 diabetes mellitus      Past Surgical History:   Procedure Laterality Date    ANKLE SURGERY Left     FRACTURE SURGERY  2017    OTHER SURGICAL HISTORY      Cyst removal from neck near spine    SPINE SURGERY  2012     Family History   Problem Relation Age of Onset    Diabetes Mother     Hypertension Mother     Asthma Mother     Dementia Mother     Diabetes Brother     Hypertension Brother      Social History     Socioeconomic History    Marital status:     Number of children: 0   Tobacco Use    Smoking status: Every Day     Current packs/day: 1.00     Average packs/day: 1 pack/day for 20.0 years (20.0 ttl pk-yrs)     Types: Cigarettes    Smokeless tobacco: Never    Tobacco comments:     Started smoking around age 19.   Vaping Use    Vaping status: Some Days   Substance and Sexual Activity    Alcohol use: Not Currently     Comment: 'hardly any anymore'    Drug use: Never    Sexual activity: Not Currently     Partners: Female     No Known Allergies    Current Outpatient Medications:     atorvastatin (LIPITOR) 10 MG tablet, Take 1 tablet by mouth Daily., Disp: 90 tablet, Rfl: 3    benazepril (LOTENSIN) 40 MG tablet, Take 1 tablet by mouth Daily., Disp: 90 tablet, Rfl: 3    cetirizine (zyrTEC) 10 MG tablet, Take 1 tablet by mouth Daily., Disp: , Rfl:     Continuous Blood Gluc Sensor (FreeStyle Mc 3 Sensor) misc, 1 each by Other route Every 14 (Fourteen) Days., Disp: 6 each, Rfl: 1    cyclobenzaprine (FLEXERIL) 10 MG tablet, Take 1 tablet by mouth Daily As Needed for Muscle Spasms., Disp: 40 tablet, Rfl: 1    Eliquis 5 MG tablet tablet, Take 1 tablet by mouth 2 (Two) Times a Day., Disp: 180 tablet, Rfl: 3    empagliflozin (Jardiance) 25 MG tablet tablet, TAKE 1 TABLET BY MOUTH ONCE DAILY FOR  180  DAYS, Disp: 90 tablet, Rfl: 1    insulin degludec (TRESIBA FLEXTOUCH) 100 UNIT/ML solution pen-injector  "injection, Inject 50 Units under the skin into the appropriate area as directed Daily for 180 days., Disp: 45 mL, Rfl: 1    metFORMIN ER (GLUCOPHAGE-XR) 500 MG 24 hr tablet, Take 2 tablets by mouth Daily., Disp: 180 tablet, Rfl: 3    metoprolol succinate XL (TOPROL-XL) 50 MG 24 hr tablet, Take 1 tablet by mouth Daily., Disp: 90 tablet, Rfl: 3    sertraline (ZOLOFT) 50 MG tablet, Take 1 tablet by mouth Daily., Disp: 90 tablet, Rfl: 3    sildenafil (VIAGRA) 100 MG tablet, Take 0.5 to 1 tablet approximately 1 hour prior to sex as needed, Disp: 30 tablet, Rfl: 2    sotalol (BETAPACE) 80 MG tablet, Take 1 tablet by mouth 2 (Two) Times a Day., Disp: 180 tablet, Rfl: 3    Semaglutide, 2 MG/DOSE, (Ozempic, 2 MG/DOSE,) 8 MG/3ML solution pen-injector, Inject 2 mg under the skin into the appropriate area as directed Every 7 (Seven) Days for 180 days. Prescription written for 90 days but can be dispensed as 30 days., Disp: 9 mL, Rfl: 1    Objective     Vitals:    07/12/24 1548   BP: 127/93   Pulse: 55   SpO2: 98%   Weight: 89.4 kg (197 lb)   Height: 172.7 cm (68\")   PainSc:   5     Body mass index is 29.95 kg/m².    Physical Exam  Constitutional:       Appearance: Normal appearance. He is normal weight.      Comments: Overweight (BMI 25 - 29.9) Pt Current BMI = 29.95      HENT:      Head: Normocephalic and atraumatic.      Right Ear: External ear normal.      Left Ear: External ear normal.      Nose: Nose normal.   Eyes:      Extraocular Movements: Extraocular movements intact.      Conjunctiva/sclera: Conjunctivae normal.   Pulmonary:      Effort: Pulmonary effort is normal.   Musculoskeletal:         General: Normal range of motion.      Cervical back: Normal range of motion.   Skin:     General: Skin is warm and dry.   Neurological:      General: No focal deficit present.      Mental Status: He is alert and oriented to person, place, and time. Mental status is at baseline.   Psychiatric:         Mood and Affect: Mood normal. "         Behavior: Behavior normal.         Thought Content: Thought content normal.         Judgment: Judgment normal.             Result Review :   The following data was reviewed by: JENISE Pacheco on 07/12/2024:    Most Recent A1C          7/12/2024    15:49   HGBA1C Most Recent   Hemoglobin A1C 7.6        A1C Last 3 Results          11/10/2023    15:07 3/8/2024    16:02 7/12/2024    15:49   HGBA1C Last 3 Results   Hemoglobin A1C 8.6  8.1  7.6      A1c collected in the office today is 7.6%, indicating Uncontrolled Type II diabetes.  This result is down from the prior result of 8.1% collected on 3/8/24             Assessment: Patient has had a 7 pound weight loss since his last visit. CGM report shows persistent postprandial hyperglycemia after 2 pm when he eats his lunch, and again after his supper time meal.      Diagnoses and all orders for this visit:    1. Uncontrolled type 2 diabetes mellitus with hyperglycemia, with long-term current use of insulin (Primary)  -     POC Glycosylated Hemoglobin (Hb A1C)  -     Semaglutide, 2 MG/DOSE, (Ozempic, 2 MG/DOSE,) 8 MG/3ML solution pen-injector; Inject 2 mg under the skin into the appropriate area as directed Every 7 (Seven) Days for 180 days. Prescription written for 90 days but can be dispensed as 30 days.  Dispense: 9 mL; Refill: 1  -     insulin degludec (TRESIBA FLEXTOUCH) 100 UNIT/ML solution pen-injector injection; Inject 50 Units under the skin into the appropriate area as directed Daily for 180 days.  Dispense: 45 mL; Refill: 1    2. Uses self-applied continuous glucose monitoring device    3. Overweight (BMI 25.0-29.9)        Plan: Increase Ozempic to the 2mg dose and increase Tresiba to 50 units once daily. The patient will reduce the Tresiba to 45 units if he experiences hypoglycemia. The patient will continue to focus on dietary changes and exercise to promote further weight loss and improved glycemic control.    The patient will monitor his  blood glucose levels per CGM.  If he develops problematic hyperglycemia or hypoglycemia or adverse drug reactions, he will contact the office for further instructions.        Follow Up     Return in about 3 months (around 10/12/2024) for Medication Management, CGM Follow-up.    Patient was given instructions and counseling regarding his condition or for health maintenance advice. Please see specific information pulled into the AVS if appropriate.     Aziza Brock, JENISE  07/12/2024      Dictated Utilizing Dragon Dictation.  Please note that portions of this note were completed with a voice recognition program.  Part of this note may be an electronic transcription/translation of spoken language to printed text using the Dragon Dictation System.

## 2024-07-22 ENCOUNTER — LAB (OUTPATIENT)
Dept: LAB | Facility: HOSPITAL | Age: 49
End: 2024-07-22
Payer: COMMERCIAL

## 2024-07-22 ENCOUNTER — OFFICE VISIT (OUTPATIENT)
Dept: FAMILY MEDICINE CLINIC | Age: 49
End: 2024-07-22
Payer: COMMERCIAL

## 2024-07-22 VITALS
BODY MASS INDEX: 30.19 KG/M2 | DIASTOLIC BLOOD PRESSURE: 87 MMHG | WEIGHT: 199.2 LBS | HEART RATE: 96 BPM | HEIGHT: 68 IN | SYSTOLIC BLOOD PRESSURE: 112 MMHG | TEMPERATURE: 97.8 F | OXYGEN SATURATION: 98 %

## 2024-07-22 DIAGNOSIS — E66.09 CLASS 1 OBESITY DUE TO EXCESS CALORIES WITH SERIOUS COMORBIDITY AND BODY MASS INDEX (BMI) OF 32.0 TO 32.9 IN ADULT: ICD-10-CM

## 2024-07-22 DIAGNOSIS — F41.1 GENERALIZED ANXIETY DISORDER: ICD-10-CM

## 2024-07-22 DIAGNOSIS — Z12.5 PROSTATE CANCER SCREENING: Primary | ICD-10-CM

## 2024-07-22 DIAGNOSIS — I10 ESSENTIAL HYPERTENSION: ICD-10-CM

## 2024-07-22 DIAGNOSIS — Z79.4 TYPE 2 DIABETES MELLITUS WITH HYPERGLYCEMIA, WITH LONG-TERM CURRENT USE OF INSULIN: ICD-10-CM

## 2024-07-22 DIAGNOSIS — E78.2 MIXED HYPERLIPIDEMIA: ICD-10-CM

## 2024-07-22 DIAGNOSIS — Z00.00 PHYSICAL EXAM: ICD-10-CM

## 2024-07-22 DIAGNOSIS — E11.65 TYPE 2 DIABETES MELLITUS WITH HYPERGLYCEMIA, WITH LONG-TERM CURRENT USE OF INSULIN: ICD-10-CM

## 2024-07-22 DIAGNOSIS — F17.210 NICOTINE DEPENDENCE, CIGARETTES, UNCOMPLICATED: ICD-10-CM

## 2024-07-22 DIAGNOSIS — Z00.00 PHYSICAL EXAM: Primary | ICD-10-CM

## 2024-07-22 DIAGNOSIS — I48.11 LONGSTANDING PERSISTENT ATRIAL FIBRILLATION: ICD-10-CM

## 2024-07-22 LAB
ALBUMIN SERPL-MCNC: 4.2 G/DL (ref 3.5–5.2)
ALBUMIN UR-MCNC: <1.2 MG/DL
ALBUMIN/GLOB SERPL: 1.6 G/DL
ALP SERPL-CCNC: 103 U/L (ref 39–117)
ALT SERPL W P-5'-P-CCNC: 18 U/L (ref 1–41)
ANION GAP SERPL CALCULATED.3IONS-SCNC: 10.8 MMOL/L (ref 5–15)
AST SERPL-CCNC: 17 U/L (ref 1–40)
BACTERIA UR QL AUTO: NORMAL /HPF
BILIRUB SERPL-MCNC: 0.5 MG/DL (ref 0–1.2)
BILIRUB UR QL STRIP: NEGATIVE
BUN SERPL-MCNC: 13 MG/DL (ref 6–20)
BUN/CREAT SERPL: 18.6 (ref 7–25)
CALCIUM SPEC-SCNC: 9.5 MG/DL (ref 8.6–10.5)
CHLORIDE SERPL-SCNC: 102 MMOL/L (ref 98–107)
CHOLEST SERPL-MCNC: 125 MG/DL (ref 0–200)
CLARITY UR: CLEAR
CO2 SERPL-SCNC: 27.2 MMOL/L (ref 22–29)
COLOR UR: YELLOW
CREAT SERPL-MCNC: 0.7 MG/DL (ref 0.76–1.27)
EGFRCR SERPLBLD CKD-EPI 2021: 112.3 ML/MIN/1.73
GLOBULIN UR ELPH-MCNC: 2.6 GM/DL
GLUCOSE SERPL-MCNC: 100 MG/DL (ref 65–99)
GLUCOSE UR STRIP-MCNC: ABNORMAL MG/DL
HDLC SERPL-MCNC: 31 MG/DL (ref 40–60)
HGB UR QL STRIP.AUTO: ABNORMAL
KETONES UR QL STRIP: NEGATIVE
LDLC SERPL CALC-MCNC: 78 MG/DL (ref 0–100)
LDLC/HDLC SERPL: 2.54 {RATIO}
LEUKOCYTE ESTERASE UR QL STRIP.AUTO: NEGATIVE
NITRITE UR QL STRIP: NEGATIVE
PH UR STRIP.AUTO: 5.5 [PH] (ref 5–8)
POTASSIUM SERPL-SCNC: 4.3 MMOL/L (ref 3.5–5.2)
PROT SERPL-MCNC: 6.8 G/DL (ref 6–8.5)
PROT UR QL STRIP: NEGATIVE
RBC # UR STRIP: NORMAL /HPF
REF LAB TEST METHOD: NORMAL
SODIUM SERPL-SCNC: 140 MMOL/L (ref 136–145)
SP GR UR STRIP: 1.02 (ref 1–1.03)
SQUAMOUS #/AREA URNS HPF: NORMAL /HPF
TRIGL SERPL-MCNC: 77 MG/DL (ref 0–150)
UROBILINOGEN UR QL STRIP: ABNORMAL
VLDLC SERPL-MCNC: 16 MG/DL (ref 5–40)
WBC # UR STRIP: NORMAL /HPF

## 2024-07-22 PROCEDURE — 80061 LIPID PANEL: CPT

## 2024-07-22 PROCEDURE — 81001 URINALYSIS AUTO W/SCOPE: CPT

## 2024-07-22 PROCEDURE — 36415 COLL VENOUS BLD VENIPUNCTURE: CPT

## 2024-07-22 PROCEDURE — 99396 PREV VISIT EST AGE 40-64: CPT | Performed by: FAMILY MEDICINE

## 2024-07-22 PROCEDURE — 82043 UR ALBUMIN QUANTITATIVE: CPT

## 2024-07-22 PROCEDURE — G0103 PSA SCREENING: HCPCS

## 2024-07-22 PROCEDURE — 80053 COMPREHEN METABOLIC PANEL: CPT

## 2024-07-22 RX ORDER — BENAZEPRIL HYDROCHLORIDE 40 MG/1
40 TABLET ORAL DAILY
Qty: 90 TABLET | Refills: 3 | Status: SHIPPED | OUTPATIENT
Start: 2024-07-22

## 2024-07-22 RX ORDER — APIXABAN 5 MG/1
5 TABLET, FILM COATED ORAL 2 TIMES DAILY
Qty: 180 TABLET | Refills: 3 | Status: SHIPPED | OUTPATIENT
Start: 2024-07-22

## 2024-07-22 RX ORDER — ATORVASTATIN CALCIUM 10 MG/1
10 TABLET, FILM COATED ORAL DAILY
Qty: 90 TABLET | Refills: 3 | Status: SHIPPED | OUTPATIENT
Start: 2024-07-22

## 2024-07-22 RX ORDER — SOTALOL HYDROCHLORIDE 80 MG/1
80 TABLET ORAL 2 TIMES DAILY
Qty: 180 TABLET | Refills: 3 | Status: SHIPPED | OUTPATIENT
Start: 2024-07-22

## 2024-07-22 RX ORDER — METFORMIN HYDROCHLORIDE 500 MG/1
1000 TABLET, EXTENDED RELEASE ORAL DAILY
Qty: 180 TABLET | Refills: 3 | Status: SHIPPED | OUTPATIENT
Start: 2024-07-22

## 2024-07-22 RX ORDER — METOPROLOL SUCCINATE 50 MG/1
50 TABLET, EXTENDED RELEASE ORAL DAILY
Qty: 90 TABLET | Refills: 3 | Status: SHIPPED | OUTPATIENT
Start: 2024-07-22

## 2024-07-22 RX ORDER — FLUTICASONE PROPIONATE 50 MCG
SPRAY, SUSPENSION (ML) NASAL AS NEEDED
COMMUNITY
Start: 2024-04-03

## 2024-07-22 NOTE — PROGRESS NOTES
Ayden Murillo presents to [facility].    Chief Complaint:  Annual physical, diabetes, blood pressure, cholesterol    Subjective   History of Present Illness:  Ayden is being seen today for annual physical and follow-up.  He is 49 years old and works for [employer] where he has been for about 7.5 years.  He continues to smoke cigarettes on a daily basis.  He says he currently smokes a little below 1 pack daily.  He would estimate that he has smoked roughly 25 pack years.  He has not started low-dose CT for lung cancer screening yet.  He is up-to-date on colon cancer screening.  He is due for prostate cancer screening.    Ayden also has type 2 diabetes, hypertension, and hyperlipidemia.  He remains on treatment for these issues as noted.  He does see JENISE Astudillo for help with his diabetes care.  His most recent A1c was 7.6% 10 days ago.  That is an improvement for him.  He has also lost a significant amount of weight over the last year with GLP-1 agonist.    Ayden also has atrial fibrillation.  He remains on multiple treatments for this including Eliquis.  He denies any issues with bleeding.    He is having an issue with right lateral thigh pain.  This is more of a burning pain that he has with burning.  This does not impact activity.    Review of Systems:  Review of Systems   Constitutional:  Negative for chills and fever.   Respiratory:  Negative for cough and shortness of breath.    Cardiovascular:  Negative for chest pain and palpitations.   Gastrointestinal:  Negative for abdominal pain, nausea and vomiting.      Objective   Medical History:  Past Medical History:    Allergic    Arthritis    Asthma    Atrial fibrillation    Erectile dysfunction    Essential hypertension    Heart murmur    HL (hearing loss)    Mixed hyperlipidemia    Type 2 diabetes mellitus     Past Surgical History:    ANKLE SURGERY    FRACTURE SURGERY    OTHER SURGICAL HISTORY    Cyst  removal from neck near spine    SPINE SURGERY      Family History   Problem Relation Age of Onset    Diabetes Mother     Hypertension Mother     Asthma Mother     Dementia Mother     Diabetes Brother     Hypertension Brother      Social History     Tobacco Use    Smoking status: Every Day     Current packs/day: 1.00     Average packs/day: 1 pack/day for 20.0 years (20.0 ttl pk-yrs)     Types: Cigarettes    Smokeless tobacco: Never    Tobacco comments:     Started smoking around age 19.   Substance Use Topics    Alcohol use: Not Currently     Comment: 'hardly any anymore'       Health Maintenance Due   Topic Date Due    Hepatitis B (1 of 3 - 19+ 3-dose series) Never done    TDAP/TD VACCINES (1 - Tdap) Never done    ZOSTER VACCINE (1 of 2) Never done    LUNG CANCER SCREENING  Never done    LIPID PANEL  07/24/2024        Immunization History   Administered Date(s) Administered    COVID-19 (JIM) 05/01/2021    COVID-19 (PFIZER) BIVALENT 12+YRS 01/23/2023    COVID-19 (PFIZER) Purple Cap Monovalent 01/04/2022    COVID-19 F23 (PFIZER) 12YRS+ (COMIRNATY) 01/22/2024    Fluzone (or Fluarix & Flulaval for VFC) >6mos 01/04/2022, 12/15/2022, 01/22/2024    Pneumococcal Conjugate 20-Valent (PCV20) 01/23/2023    Pneumococcal Polysaccharide (PPSV23) 12/13/2013       No Known Allergies     Medications:  Current Outpatient Medications on File Prior to Visit   Medication Sig    cetirizine (zyrTEC) 10 MG tablet Take 1 tablet by mouth Daily.    Continuous Blood Gluc Sensor (FreeStyle Mc 3 Sensor) misc 1 each by Other route Every 14 (Fourteen) Days.    cyclobenzaprine (FLEXERIL) 10 MG tablet Take 1 tablet by mouth Daily As Needed for Muscle Spasms.    empagliflozin (Jardiance) 25 MG tablet tablet TAKE 1 TABLET BY MOUTH ONCE DAILY FOR  180  DAYS    fluticasone (FLONASE) 50 MCG/ACT nasal spray As Needed.    insulin degludec (TRESIBA FLEXTOUCH) 100 UNIT/ML solution pen-injector injection Inject 50 Units under the skin into the  "appropriate area as directed Daily for 180 days.    Semaglutide, 2 MG/DOSE, (Ozempic, 2 MG/DOSE,) 8 MG/3ML solution pen-injector Inject 2 mg under the skin into the appropriate area as directed Every 7 (Seven) Days for 180 days. Prescription written for 90 days but can be dispensed as 30 days.    sildenafil (VIAGRA) 100 MG tablet Take 0.5 to 1 tablet approximately 1 hour prior to sex as needed    [DISCONTINUED] atorvastatin (LIPITOR) 10 MG tablet Take 1 tablet by mouth Daily.    [DISCONTINUED] benazepril (LOTENSIN) 40 MG tablet Take 1 tablet by mouth Daily.    [DISCONTINUED] Eliquis 5 MG tablet tablet Take 1 tablet by mouth 2 (Two) Times a Day.    [DISCONTINUED] metFORMIN ER (GLUCOPHAGE-XR) 500 MG 24 hr tablet Take 2 tablets by mouth Daily.    [DISCONTINUED] metoprolol succinate XL (TOPROL-XL) 50 MG 24 hr tablet Take 1 tablet by mouth Daily.    [DISCONTINUED] sertraline (ZOLOFT) 50 MG tablet Take 1 tablet by mouth Daily.    [DISCONTINUED] sotalol (BETAPACE) 80 MG tablet Take 1 tablet by mouth 2 (Two) Times a Day.     No current facility-administered medications on file prior to visit.       Vital Signs:   /87 (BP Location: Left arm, Patient Position: Sitting, Cuff Size: Adult)   Pulse 96   Temp 97.8 °F (36.6 °C) (Oral)   Ht 172.7 cm (67.99\")   Wt 90.4 kg (199 lb 3.2 oz)   SpO2 98%   BMI 30.30 kg/m²       Physical Exam:  Physical Exam  Vitals and nursing note reviewed. Chaperone present: patient declined chaperone.   Constitutional:       General: He is not in acute distress.     Appearance: He is not ill-appearing.   HENT:      Right Ear: Tympanic membrane and ear canal normal.      Left Ear: Tympanic membrane and ear canal normal.      Mouth/Throat:      Mouth: Mucous membranes are moist.      Comments: Pharynx appears normal  Eyes:      Extraocular Movements: Extraocular movements intact.      Pupils: Pupils are equal, round, and reactive to light.   Neck:      Thyroid: No thyromegaly. "   Cardiovascular:      Rate and Rhythm: Normal rate and regular rhythm.      Heart sounds: No murmur heard.  Pulmonary:      Effort: Pulmonary effort is normal.      Breath sounds: Normal breath sounds.   Abdominal:      General: There is no distension.      Palpations: Abdomen is soft. There is no mass.      Tenderness: There is no abdominal tenderness.   Genitourinary:     Prostate: Not enlarged and no nodules present.      Rectum: Guaiac result negative. No mass.   Musculoskeletal:      Cervical back: Normal range of motion.   Skin:     Findings: No lesion or rash.   Neurological:      General: No focal deficit present.      Mental Status: He is oriented to person, place, and time.      Cranial Nerves: No cranial nerve deficit.   Psychiatric:         Mood and Affect: Mood normal.     Result Review   The following data was reviewed by Arik Franco MD on 07/22/2024.  Lab Results   Component Value Date    WBC 10.29 01/22/2024    HGB 17.6 01/22/2024    HCT 53.9 (H) 01/22/2024    MCV 97.5 (H) 01/22/2024     01/22/2024     Lab Results   Component Value Date    GLUCOSE 177 (H) 01/22/2024    BUN 11 01/22/2024    CREATININE 0.76 01/22/2024     01/22/2024    K 4.9 01/22/2024     01/22/2024    CO2 27.5 01/22/2024    CALCIUM 9.8 01/22/2024    PROTEINTOT 7.4 01/22/2024    ALBUMIN 4.4 01/22/2024    ALT 22 01/22/2024    AST 17 01/22/2024    ALKPHOS 111 01/22/2024    BILITOT 0.4 01/22/2024    EGFR 110.2 01/22/2024    GLOB 3.0 01/22/2024    AGRATIO 1.5 01/22/2024    BCR 14.5 01/22/2024    ANIONGAP 11.5 01/22/2024      Lab Results   Component Value Date    CHOL 139 07/24/2023    CHLPL 147 05/27/2021    TRIG 74 07/24/2023    HDL 31 (L) 07/24/2023    LDL 93 07/24/2023     Lab Results   Component Value Date    TSH 3.640 01/22/2024     Lab Results   Component Value Date    HGBA1C 7.6 (A) 07/12/2024     BMI is >= 30 and <35. (Class 1 Obesity). The following options were offered after discussion;: exercise  counseling/recommendations and nutrition counseling/recommendations         Assessment and Plan:   Today, we have reviewed his care.  Regarding the annual physical, Ayden is due for PSA.  We will arrange that.  He is up-to-date on colon cancer screening as noted.  We will also move ahead with a low-dose CT.  I have encouraged him to make every effort to try to stop smoking.  Regarding his other problems, we will refill his medications and update labs as noted.  Tentative follow-up with me will be again in about 6 months, sooner if needed.    Diagnoses and all orders for this visit:    1. Physical exam (Primary)  -     Urinalysis With Microscopic - Urine, Clean Catch; Future  -     MicroAlbumin, Urine, Random - Urine, Clean Catch; Future  -     Lipid Panel; Future  -     Comprehensive Metabolic Panel; Future    2. Type 2 diabetes mellitus with hyperglycemia, with long-term current use of insulin  -     MicroAlbumin, Urine, Random - Urine, Clean Catch; Future  -     Comprehensive Metabolic Panel; Future  -     metFORMIN ER (GLUCOPHAGE-XR) 500 MG 24 hr tablet; Take 2 tablets by mouth Daily.  Dispense: 180 tablet; Refill: 3    3. Essential hypertension  -     Urinalysis With Microscopic - Urine, Clean Catch; Future  -     Comprehensive Metabolic Panel; Future  -     benazepril (LOTENSIN) 40 MG tablet; Take 1 tablet by mouth Daily.  Dispense: 90 tablet; Refill: 3  -     metoprolol succinate XL (TOPROL-XL) 50 MG 24 hr tablet; Take 1 tablet by mouth Daily.  Dispense: 90 tablet; Refill: 3    4. Mixed hyperlipidemia  -     Comprehensive Metabolic Panel; Future  -     atorvastatin (LIPITOR) 10 MG tablet; Take 1 tablet by mouth Daily.  Dispense: 90 tablet; Refill: 3    5. Longstanding persistent atrial fibrillation  -     Eliquis 5 MG tablet tablet; Take 1 tablet by mouth 2 (Two) Times a Day.  Dispense: 180 tablet; Refill: 3  -     metoprolol succinate XL (TOPROL-XL) 50 MG 24 hr tablet; Take 1 tablet by mouth Daily.  Dispense:  90 tablet; Refill: 3  -     sotalol (BETAPACE) 80 MG tablet; Take 1 tablet by mouth 2 (Two) Times a Day.  Dispense: 180 tablet; Refill: 3    6. Class 1 obesity due to excess calories with serious comorbidity and body mass index (BMI) of 32.0 to 32.9 in adult  -     Lipid Panel; Future    7. Generalized anxiety disorder  -     sertraline (ZOLOFT) 50 MG tablet; Take 1 tablet by mouth Daily.  Dispense: 90 tablet; Refill: 3    8. Nicotine dependence, cigarettes, uncomplicated  -     CT Chest Low Dose Wo; Future    Follow Up  Return in about 6 months (around 1/22/2025) for Recheck.  Patient was given instructions and counseling regarding his condition or for health maintenance advice. Please see specific information pulled into the AVS if appropriate.

## 2024-07-23 LAB — PSA SERPL-MCNC: 0.35 NG/ML (ref 0–4)

## 2024-08-19 ENCOUNTER — HOSPITAL ENCOUNTER (OUTPATIENT)
Dept: CT IMAGING | Facility: HOSPITAL | Age: 49
Discharge: HOME | End: 2024-08-19
Admitting: FAMILY MEDICINE
Payer: COMMERCIAL

## 2024-08-19 DIAGNOSIS — F17.210 NICOTINE DEPENDENCE, CIGARETTES, UNCOMPLICATED: ICD-10-CM

## 2024-08-19 PROCEDURE — 71271 CT THORAX LUNG CANCER SCR C-: CPT

## 2024-08-21 DIAGNOSIS — I25.10 CORONARY ARTERY CALCIFICATION: ICD-10-CM

## 2024-08-21 DIAGNOSIS — I48.11 LONGSTANDING PERSISTENT ATRIAL FIBRILLATION: Primary | ICD-10-CM

## 2024-08-21 DIAGNOSIS — I25.84 CORONARY ARTERY CALCIFICATION: ICD-10-CM

## 2024-09-10 ENCOUNTER — E-VISIT (OUTPATIENT)
Dept: ADMINISTRATIVE | Facility: OTHER | Age: 49
End: 2024-09-10
Payer: COMMERCIAL

## 2024-09-10 NOTE — E-VISIT ESCALATED
Status: Referred Out  Date: 09/10/2024 08:34:50  Acuity Level: Not applicable  Referral message: Based on the information you provided during your interview, eVisit is not appropriate for treating your condition.  Patient: Ayden Murillo  Patient : 1975  Patient Address: [address] [place] [address]  Patient Phone: [phone]  Clinician Response: Unavailable  Diagnosis: Unavailable  Diagnosis ICD: Unavailable     Patient Interview Questions and Responses: None available

## 2024-09-10 NOTE — E-VISIT ESCALATED
Status: Referred Out  Date: 09/10/2024 08:40:13  Acuity Level: Within 1-2 weeks  Referral message:  We're sorry you are not feeling well. Your safety is important to us. Low back pain caused by muscle strains or overuse generally resolves without treatment in a few weeks. Back pain over 12 weeks ago is considered chronic pain which   will require multiple follow-ups. We would like for you to be seen in person to determine the cause of your symptoms and care that would be most effective for you.  For the most appropriate care, please be seen:   At a clinic  Within 1-2 weeks   You   won't be charged for this visit.&nbsp;We hope you feel better soon!   Patient: Ayden Murillo  Patient : 1975  Patient Address: [address] [place] [address]  Patient Phone: [phone]  Clinician Response: Unavailable  Diagnosis: Unavailable  Diagnosis ICD: Unavailable     Patient Interview Questions and Responses:  Clinical Protocol: Low back pain  Please select the reason for your visit today.: Low back pain  When did your back pain begin?: More than 12 weeks ago

## 2024-10-07 DIAGNOSIS — Z79.4 UNCONTROLLED TYPE 2 DIABETES MELLITUS WITH HYPERGLYCEMIA, WITH LONG-TERM CURRENT USE OF INSULIN: ICD-10-CM

## 2024-10-07 DIAGNOSIS — E11.65 UNCONTROLLED TYPE 2 DIABETES MELLITUS WITH HYPERGLYCEMIA, WITH LONG-TERM CURRENT USE OF INSULIN: ICD-10-CM

## 2024-10-07 RX ORDER — BLOOD-GLUCOSE SENSOR
EACH MISCELLANEOUS
Qty: 2 EACH | Refills: 2 | Status: SHIPPED | OUTPATIENT
Start: 2024-10-07

## 2024-11-11 ENCOUNTER — TELEPHONE (OUTPATIENT)
Dept: DIABETES SERVICES | Facility: HOSPITAL | Age: 49
End: 2024-11-11
Payer: COMMERCIAL

## 2024-11-11 DIAGNOSIS — Z79.4 UNCONTROLLED TYPE 2 DIABETES MELLITUS WITH HYPERGLYCEMIA, WITH LONG-TERM CURRENT USE OF INSULIN: ICD-10-CM

## 2024-11-11 DIAGNOSIS — E11.65 UNCONTROLLED TYPE 2 DIABETES MELLITUS WITH HYPERGLYCEMIA, WITH LONG-TERM CURRENT USE OF INSULIN: ICD-10-CM

## 2024-11-11 NOTE — TELEPHONE ENCOUNTER
Caller: Ayden Murillo    Relationship: Self    Best call back number: [phone]    Which medication are you concerned about: TRESIBA     Who prescribed you this medication: JUAN     When did you start taking this medication: LAST VISIT     What are your concerns: PT TRESIBA WAS RAISED TO 50 UNITS FROM 40, NEEDS A REFILL  BUT IT IS TOO SOON PER INS. NEEDS A NEW RX WRITTEN FOR THE CORRECT DOSAGE.   How long have you had these concerns: NA

## 2024-11-12 DIAGNOSIS — Z79.4 UNCONTROLLED TYPE 2 DIABETES MELLITUS WITH HYPERGLYCEMIA, WITH LONG-TERM CURRENT USE OF INSULIN: ICD-10-CM

## 2024-11-12 DIAGNOSIS — E11.65 UNCONTROLLED TYPE 2 DIABETES MELLITUS WITH HYPERGLYCEMIA, WITH LONG-TERM CURRENT USE OF INSULIN: ICD-10-CM

## 2024-11-12 RX ORDER — EMPAGLIFLOZIN 25 MG/1
TABLET, FILM COATED ORAL
Qty: 90 TABLET | Refills: 0 | Status: SHIPPED | OUTPATIENT
Start: 2024-11-12

## 2024-11-22 ENCOUNTER — OFFICE VISIT (OUTPATIENT)
Dept: DIABETES SERVICES | Facility: CLINIC | Age: 49
End: 2024-11-22
Payer: COMMERCIAL

## 2024-11-22 VITALS
HEIGHT: 68 IN | SYSTOLIC BLOOD PRESSURE: 125 MMHG | BODY MASS INDEX: 29.86 KG/M2 | WEIGHT: 197 LBS | HEART RATE: 78 BPM | OXYGEN SATURATION: 100 % | DIASTOLIC BLOOD PRESSURE: 82 MMHG

## 2024-11-22 DIAGNOSIS — E66.3 OVERWEIGHT (BMI 25.0-29.9): ICD-10-CM

## 2024-11-22 DIAGNOSIS — E11.9 CONTROLLED TYPE 2 DIABETES MELLITUS WITHOUT COMPLICATION, WITH LONG-TERM CURRENT USE OF INSULIN: Primary | ICD-10-CM

## 2024-11-22 DIAGNOSIS — Z79.4 CONTROLLED TYPE 2 DIABETES MELLITUS WITHOUT COMPLICATION, WITH LONG-TERM CURRENT USE OF INSULIN: Primary | ICD-10-CM

## 2024-11-22 LAB
EXPIRATION DATE: ABNORMAL
HBA1C MFR BLD: 7 % (ref 4.5–5.7)
Lab: ABNORMAL

## 2024-11-22 PROCEDURE — 99214 OFFICE O/P EST MOD 30 MIN: CPT | Performed by: NURSE PRACTITIONER

## 2024-11-22 RX ORDER — BLOOD-GLUCOSE SENSOR
2 EACH MISCELLANEOUS
Qty: 2 EACH | Refills: 5 | Status: SHIPPED | OUTPATIENT
Start: 2024-11-22

## 2024-11-22 RX ORDER — SEMAGLUTIDE 2.68 MG/ML
2 INJECTION, SOLUTION SUBCUTANEOUS
Qty: 9 ML | Refills: 1 | Status: SHIPPED | OUTPATIENT
Start: 2024-11-22 | End: 2025-05-21

## 2024-11-22 RX ORDER — METFORMIN HYDROCHLORIDE 500 MG/1
1000 TABLET, EXTENDED RELEASE ORAL DAILY
Qty: 180 TABLET | Refills: 1 | Status: SHIPPED | OUTPATIENT
Start: 2024-11-22

## 2024-11-22 RX ORDER — PEN NEEDLE, DIABETIC 30 GX3/16"
1 NEEDLE, DISPOSABLE MISCELLANEOUS DAILY
Qty: 100 EACH | Refills: 1 | Status: SHIPPED | OUTPATIENT
Start: 2024-11-22

## 2024-11-22 NOTE — PROGRESS NOTES
Chief Complaint  Follow-up    Referred By: No ref. provider found    Subjective          Ayden Murillo presents to [facility] for diabetes medication management    History of Present Illness    Visit type:  follow-up  Diabetes type:  Type 2  Current diabetes status/concerns/issues:  He has been off of his of his CGM for about a month due to supply issues  Other health concerns:  he had some changes to his cardiac meds  Current Diabetes symptoms:    Polyuria: No   Polydipsia: No   Polyphagia: No   Blurred vision: No   Excessive fatigue: No  Known Diabetes complications:  Neuropathy: None; Location: N/A  Renal: Normal eGFR per current labs and Microalbuminuria - NEGATIVE  Eyes: No Retinopathy reported on current eye exam; Location: N/A  Amputation/Wounds: None  GI: None  Cardiovascular: Hypertension and Hyperlipidemia  ED: Patient Reported  Other: None  Hypoglycemia:  None reported at this time  Hypoglycemia Symptoms:  No hypoglycemia at this time  Current diabetes treatment:   Tresiba 50 units once a day, metformin  mg 2 tablets once a day.  Ozempic 2 mg once weekly.  Jardiance 25 mg once daily  Blood glucose device:  Mc CGM; he has been off of this for about a month; meter  Blood glucose monitoring frequency:  2  Blood glucose range/average:   staying mostly 120  Glucose Source: Device Reviewed  Diet:  Limits high carb/sweet foods, Avoids sugary drinks  Activity/Exercise:  None    Past Medical History:   Diagnosis Date    Allergic 2013    Arthritis 2017    Asthma     Atrial fibrillation     Erectile dysfunction 2022    Essential hypertension     Heart murmur 1974    HL (hearing loss) 1983    Mixed hyperlipidemia     Type 2 diabetes mellitus      Past Surgical History:   Procedure Laterality Date    ANKLE SURGERY Left     FRACTURE SURGERY  2017    OTHER SURGICAL HISTORY      Cyst removal from neck near spine    SPINE SURGERY  2012     Family History   Problem Relation  Age of Onset    Diabetes Mother     Hypertension Mother     Asthma Mother     Dementia Mother     Diabetes Brother     Hypertension Brother      Social History     Socioeconomic History    Marital status:     Number of children: 0   Tobacco Use    Smoking status: Every Day     Current packs/day: 1.00     Average packs/day: 1 pack/day for 20.0 years (20.0 ttl pk-yrs)     Types: Cigarettes    Smokeless tobacco: Never    Tobacco comments:     Started smoking around age 19.   Vaping Use    Vaping status: Some Days    Substances: THC   Substance and Sexual Activity    Alcohol use: Not Currently     Comment: 'hardly any anymore'    Drug use: Never    Sexual activity: Not Currently     Partners: Female     No Known Allergies    Current Outpatient Medications:     atorvastatin (LIPITOR) 10 MG tablet, Take 1 tablet by mouth Daily. (Patient taking differently: Take 2 tablets by mouth Daily.), Disp: 90 tablet, Rfl: 3    benazepril (LOTENSIN) 40 MG tablet, Take 1 tablet by mouth Daily., Disp: 90 tablet, Rfl: 3    cetirizine (zyrTEC) 10 MG tablet, Take 1 tablet by mouth Daily., Disp: , Rfl:     cyclobenzaprine (FLEXERIL) 10 MG tablet, Take 1 tablet by mouth Daily As Needed for Muscle Spasms., Disp: 40 tablet, Rfl: 1    Eliquis 5 MG tablet tablet, Take 1 tablet by mouth 2 (Two) Times a Day., Disp: 180 tablet, Rfl: 3    empagliflozin (Jardiance) 25 MG tablet tablet, Take 1 tablet by mouth Daily., Disp: 90 tablet, Rfl: 1    fluticasone (FLONASE) 50 MCG/ACT nasal spray, As Needed., Disp: , Rfl:     insulin degludec (TRESIBA FLEXTOUCH) 100 UNIT/ML solution pen-injector injection, Inject 50 Units under the skin into the appropriate area as directed Daily for 180 days., Disp: 45 mL, Rfl: 1    metFORMIN ER (GLUCOPHAGE-XR) 500 MG 24 hr tablet, Take 2 tablets by mouth Daily., Disp: 180 tablet, Rfl: 1    metoprolol succinate XL (TOPROL-XL) 50 MG 24 hr tablet, Take 1 tablet by mouth Daily. (Patient taking differently: Take 1 tablet  "by mouth 2 (Two) Times a Day.), Disp: 90 tablet, Rfl: 3    Semaglutide, 2 MG/DOSE, (Ozempic, 2 MG/DOSE,) 8 MG/3ML solution pen-injector, Inject 2 mg under the skin into the appropriate area as directed Every 7 (Seven) Days for 180 days. Prescription written for 90 days but can be dispensed as 30 days., Disp: 9 mL, Rfl: 1    sertraline (ZOLOFT) 50 MG tablet, Take 1 tablet by mouth Daily., Disp: 90 tablet, Rfl: 3    sildenafil (VIAGRA) 100 MG tablet, Take 0.5 to 1 tablet approximately 1 hour prior to sex as needed, Disp: 30 tablet, Rfl: 2    Continuous Glucose Sensor (FreeStyle Mc 3 Plus Sensor), Use 2 each Every 30 (Thirty) Days. Apply as directed every 15 days, Disp: 2 each, Rfl: 5    Insulin Pen Needle (Pen Needles) 32G X 4 MM misc, Use 1 each Daily., Disp: 100 each, Rfl: 1    Objective     Vitals:    11/22/24 1440   BP: 125/82   Pulse: 78   SpO2: 100%   Weight: 89.4 kg (197 lb)   Height: 172.7 cm (67.99\")     Body mass index is 29.96 kg/m².    Physical Exam  Constitutional:       Appearance: Normal appearance.      Comments: Overweight (BMI 25 - 29.9) Pt Current BMI = 29.96    HENT:      Head: Normocephalic and atraumatic.      Right Ear: External ear normal.      Left Ear: External ear normal.      Nose: Nose normal.   Eyes:      Extraocular Movements: Extraocular movements intact.      Conjunctiva/sclera: Conjunctivae normal.   Pulmonary:      Effort: Pulmonary effort is normal.   Musculoskeletal:         General: Normal range of motion.      Cervical back: Normal range of motion.   Skin:     General: Skin is warm and dry.   Neurological:      General: No focal deficit present.      Mental Status: He is alert and oriented to person, place, and time. Mental status is at baseline.   Psychiatric:         Mood and Affect: Mood normal.         Behavior: Behavior normal.         Thought Content: Thought content normal.         Judgment: Judgment normal.             Result Review :   The following data was reviewed " by: JENISE Pacheco on 11/22/2024:    Most Recent A1C          11/22/2024    14:48   HGBA1C Most Recent   Hemoglobin A1C 7.0        A1C Last 3 Results          3/8/2024    16:02 7/12/2024    15:49 11/22/2024    14:48   HGBA1C Last 3 Results   Hemoglobin A1C 8.1  7.6  7.0      A1c collected in the office today is 7%, indicating Controlled Type II diabetes.  This result is down from the prior result of 7.6% collected on 7/12/24         Creatinine   Date Value Ref Range Status   07/22/2024 0.70 (L) 0.76 - 1.27 mg/dL Final   01/22/2024 0.76 0.76 - 1.27 mg/dL Final     eGFR   Date Value Ref Range Status   07/22/2024 112.3 >60.0 mL/min/1.73 Final   01/22/2024 110.2 >60.0 mL/min/1.73 Final     Labs collected on 7/22/24 show Normal values    Microalbumin, Urine   Date Value Ref Range Status   07/22/2024 <1.2 mg/dL Final   07/24/2023 <1.2 mg/dL Final       Urine microalbuminuria collected on 7/22/24 is negative for microalbuminuria    Total Cholesterol   Date Value Ref Range Status   07/22/2024 125 0 - 200 mg/dL Final   07/24/2023 139 0 - 200 mg/dL Final     Triglycerides   Date Value Ref Range Status   07/22/2024 77 0 - 150 mg/dL Final   07/24/2023 74 0 - 150 mg/dL Final     HDL Cholesterol   Date Value Ref Range Status   07/22/2024 31 (L) 40 - 60 mg/dL Final   07/24/2023 31 (L) 40 - 60 mg/dL Final     LDL Cholesterol    Date Value Ref Range Status   07/22/2024 78 0 - 100 mg/dL Final   07/24/2023 93 0 - 100 mg/dL Final     Lipid panel collected on 7/22/24 shows Hypercholesterolemia and low HDL            Assessment: He has had improvement in his A1c and is now on a controlled status.  He denies any problematic hypoglycemia.  He states he has been trying to focus on his diet more closely to control his glucose levels.      Diagnoses and all orders for this visit:    1. Controlled type 2 diabetes mellitus without complication, with long-term current use of insulin (Primary)  -     POC Glycosylated Hemoglobin (Hb  A1C)  -     Continuous Glucose Sensor (FreeStyle Mc 3 Plus Sensor); Use 2 each Every 30 (Thirty) Days. Apply as directed every 15 days  Dispense: 2 each; Refill: 5  -     empagliflozin (Jardiance) 25 MG tablet tablet; Take 1 tablet by mouth Daily.  Dispense: 90 tablet; Refill: 1  -     metFORMIN ER (GLUCOPHAGE-XR) 500 MG 24 hr tablet; Take 2 tablets by mouth Daily.  Dispense: 180 tablet; Refill: 1  -     Semaglutide, 2 MG/DOSE, (Ozempic, 2 MG/DOSE,) 8 MG/3ML solution pen-injector; Inject 2 mg under the skin into the appropriate area as directed Every 7 (Seven) Days for 180 days. Prescription written for 90 days but can be dispensed as 30 days.  Dispense: 9 mL; Refill: 1  -     Insulin Pen Needle (Pen Needles) 32G X 4 MM misc; Use 1 each Daily.  Dispense: 100 each; Refill: 1    2. Overweight (BMI 25.0-29.9)        Plan: No changes were made to the treatment plan today.  He will be scheduled for routine follow-up appointment.    The patient will monitor his blood glucose levels 1-2 times a day.  If he develops problematic hyperglycemia or hypoglycemia or adverse drug reactions, he will contact the office for further instructions.        Follow Up     Return in about 3 months (around 2/22/2025) for Medication Management.    Patient was given instructions and counseling regarding his condition or for health maintenance advice. Please see specific information pulled into the AVS if appropriate.     Aziza Brock, APRN  11/22/2024      Dictated Utilizing Dragon Dictation.  Please note that portions of this note were completed with a voice recognition program.  Part of this note may be an electronic transcription/translation of spoken language to printed text using the Dragon Dictation System.

## 2024-12-20 ENCOUNTER — PRIOR AUTHORIZATION (OUTPATIENT)
Dept: DIABETES SERVICES | Facility: HOSPITAL | Age: 49
End: 2024-12-20
Payer: COMMERCIAL

## 2025-02-06 ENCOUNTER — LAB (OUTPATIENT)
Dept: LAB | Facility: HOSPITAL | Age: 50
End: 2025-02-06
Payer: COMMERCIAL

## 2025-02-06 ENCOUNTER — OFFICE VISIT (OUTPATIENT)
Dept: FAMILY MEDICINE CLINIC | Age: 50
End: 2025-02-06
Payer: COMMERCIAL

## 2025-02-06 VITALS
WEIGHT: 199.8 LBS | TEMPERATURE: 98 F | DIASTOLIC BLOOD PRESSURE: 69 MMHG | OXYGEN SATURATION: 99 % | SYSTOLIC BLOOD PRESSURE: 106 MMHG | HEART RATE: 75 BPM | HEIGHT: 68 IN | BODY MASS INDEX: 30.28 KG/M2

## 2025-02-06 DIAGNOSIS — Z23 ENCOUNTER FOR IMMUNIZATION: ICD-10-CM

## 2025-02-06 DIAGNOSIS — E78.2 MIXED HYPERLIPIDEMIA: ICD-10-CM

## 2025-02-06 DIAGNOSIS — F41.1 GENERALIZED ANXIETY DISORDER: ICD-10-CM

## 2025-02-06 DIAGNOSIS — Z79.899 OTHER LONG TERM (CURRENT) DRUG THERAPY: ICD-10-CM

## 2025-02-06 DIAGNOSIS — M15.9 GENERALIZED OSTEOARTHRITIS: ICD-10-CM

## 2025-02-06 DIAGNOSIS — I10 ESSENTIAL HYPERTENSION: ICD-10-CM

## 2025-02-06 DIAGNOSIS — E11.65 TYPE 2 DIABETES MELLITUS WITH HYPERGLYCEMIA, WITH LONG-TERM CURRENT USE OF INSULIN: Primary | ICD-10-CM

## 2025-02-06 DIAGNOSIS — Z79.4 TYPE 2 DIABETES MELLITUS WITH HYPERGLYCEMIA, WITH LONG-TERM CURRENT USE OF INSULIN: Primary | ICD-10-CM

## 2025-02-06 DIAGNOSIS — Z79.4 TYPE 2 DIABETES MELLITUS WITH HYPERGLYCEMIA, WITH LONG-TERM CURRENT USE OF INSULIN: ICD-10-CM

## 2025-02-06 DIAGNOSIS — I48.11 LONGSTANDING PERSISTENT ATRIAL FIBRILLATION: ICD-10-CM

## 2025-02-06 DIAGNOSIS — E11.65 TYPE 2 DIABETES MELLITUS WITH HYPERGLYCEMIA, WITH LONG-TERM CURRENT USE OF INSULIN: ICD-10-CM

## 2025-02-06 LAB
ALBUMIN SERPL-MCNC: 4.1 G/DL (ref 3.5–5.2)
ALBUMIN UR-MCNC: <1.2 MG/DL
ALBUMIN/GLOB SERPL: 1.2 G/DL
ALP SERPL-CCNC: 126 U/L (ref 39–117)
ALT SERPL W P-5'-P-CCNC: 22 U/L (ref 1–41)
ANION GAP SERPL CALCULATED.3IONS-SCNC: 8.7 MMOL/L (ref 5–15)
AST SERPL-CCNC: 24 U/L (ref 1–40)
BILIRUB SERPL-MCNC: 0.6 MG/DL (ref 0–1.2)
BUN SERPL-MCNC: 18 MG/DL (ref 6–20)
BUN/CREAT SERPL: 19.4 (ref 7–25)
CALCIUM SPEC-SCNC: 9.7 MG/DL (ref 8.6–10.5)
CHLORIDE SERPL-SCNC: 101 MMOL/L (ref 98–107)
CHOLEST SERPL-MCNC: 107 MG/DL (ref 0–200)
CO2 SERPL-SCNC: 25.3 MMOL/L (ref 22–29)
CREAT SERPL-MCNC: 0.93 MG/DL (ref 0.76–1.27)
CREAT UR-MCNC: 66.1 MG/DL
DEPRECATED RDW RBC AUTO: 42.8 FL (ref 37–54)
EGFRCR SERPLBLD CKD-EPI 2021: 100 ML/MIN/1.73
ERYTHROCYTE [DISTWIDTH] IN BLOOD BY AUTOMATED COUNT: 12.1 % (ref 12.3–15.4)
GLOBULIN UR ELPH-MCNC: 3.4 GM/DL
GLUCOSE SERPL-MCNC: 181 MG/DL (ref 65–99)
HCT VFR BLD AUTO: 51.6 % (ref 37.5–51)
HDLC SERPL-MCNC: 31 MG/DL (ref 40–60)
HGB BLD-MCNC: 17.4 G/DL (ref 13–17.7)
LDLC SERPL CALC-MCNC: 62 MG/DL (ref 0–100)
LDLC/HDLC SERPL: 2.03 {RATIO}
MCH RBC QN AUTO: 32 PG (ref 26.6–33)
MCHC RBC AUTO-ENTMCNC: 33.7 G/DL (ref 31.5–35.7)
MCV RBC AUTO: 95 FL (ref 79–97)
MICROALBUMIN/CREAT UR: NORMAL MG/G{CREAT}
PLATELET # BLD AUTO: 245 10*3/MM3 (ref 140–450)
PMV BLD AUTO: 9.3 FL (ref 6–12)
POTASSIUM SERPL-SCNC: 4.6 MMOL/L (ref 3.5–5.2)
PROT SERPL-MCNC: 7.5 G/DL (ref 6–8.5)
RBC # BLD AUTO: 5.43 10*6/MM3 (ref 4.14–5.8)
SODIUM SERPL-SCNC: 135 MMOL/L (ref 136–145)
TRIGL SERPL-MCNC: 66 MG/DL (ref 0–150)
TSH SERPL DL<=0.05 MIU/L-ACNC: 2.13 UIU/ML (ref 0.27–4.2)
VLDLC SERPL-MCNC: 14 MG/DL (ref 5–40)
WBC NRBC COR # BLD AUTO: 12.35 10*3/MM3 (ref 3.4–10.8)

## 2025-02-06 PROCEDURE — 36415 COLL VENOUS BLD VENIPUNCTURE: CPT

## 2025-02-06 PROCEDURE — 80061 LIPID PANEL: CPT

## 2025-02-06 PROCEDURE — 99214 OFFICE O/P EST MOD 30 MIN: CPT | Performed by: FAMILY MEDICINE

## 2025-02-06 PROCEDURE — 82570 ASSAY OF URINE CREATININE: CPT

## 2025-02-06 PROCEDURE — 90480 ADMN SARSCOV2 VAC 1/ONLY CMP: CPT | Performed by: FAMILY MEDICINE

## 2025-02-06 PROCEDURE — 91320 SARSCV2 VAC 30MCG TRS-SUC IM: CPT | Performed by: FAMILY MEDICINE

## 2025-02-06 PROCEDURE — 80050 GENERAL HEALTH PANEL: CPT

## 2025-02-06 PROCEDURE — 82043 UR ALBUMIN QUANTITATIVE: CPT

## 2025-02-06 RX ORDER — CYCLOBENZAPRINE HCL 10 MG
10 TABLET ORAL DAILY PRN
Qty: 40 TABLET | Refills: 1 | Status: SHIPPED | OUTPATIENT
Start: 2025-02-06

## 2025-02-06 RX ORDER — BENAZEPRIL HYDROCHLORIDE 40 MG/1
40 TABLET ORAL DAILY
Qty: 90 TABLET | Refills: 3 | Status: SHIPPED | OUTPATIENT
Start: 2025-02-06

## 2025-02-06 RX ORDER — METOPROLOL SUCCINATE 50 MG/1
50 TABLET, EXTENDED RELEASE ORAL 2 TIMES DAILY
Qty: 180 TABLET | Refills: 3 | Status: SHIPPED | OUTPATIENT
Start: 2025-02-06

## 2025-02-06 RX ORDER — SERTRALINE HYDROCHLORIDE 100 MG/1
100 TABLET, FILM COATED ORAL DAILY
Qty: 90 TABLET | Refills: 3 | Status: SHIPPED | OUTPATIENT
Start: 2025-02-06

## 2025-02-06 RX ORDER — ATORVASTATIN CALCIUM 20 MG/1
20 TABLET, FILM COATED ORAL DAILY
Start: 2025-02-06

## 2025-02-06 RX ORDER — APIXABAN 5 MG/1
5 TABLET, FILM COATED ORAL 2 TIMES DAILY
Qty: 180 TABLET | Refills: 3 | Status: SHIPPED | OUTPATIENT
Start: 2025-02-06

## 2025-02-06 NOTE — PROGRESS NOTES
Ayden Murillo presents to [facility].    Chief Complaint:  Diabetes, blood pressure, cholesterol    Subjective   History of Present Illness:  Ayden is being seen today for follow-up on his care.  He has type 2 diabetes for which he currently takes Tresiba 50 units daily.  He also remains on metformin, Jardiance, and Ozempic 2 mg weekly.  His blood sugar has done okay recently.  It will occasionally drop but he has not had really significant issues with hypoglycemia.  His most recent A1c was 7.0% in November.    Ayden also has hypertension and elevated cholesterol.  He remains on treatment for these issues.  His blood pressure is well-controlled today.  He is not aware of any significant issue with the low blood pressures.    Ayden continues to have issues with anxiety and stress.  He states that this can impact him in the workplace.  He is currently taking sertraline, but he is not confident how much benefit it is.    Review of Systems:  Review of Systems   Constitutional:  Negative for chills, diaphoresis, fatigue and fever.   HENT:  Negative for congestion, sore throat and swollen glands.    Respiratory:  Negative for cough and shortness of breath.    Cardiovascular:  Positive for palpitations (a fib). Negative for chest pain.   Gastrointestinal:  Positive for GERD (some). Negative for abdominal pain, nausea and vomiting.   Genitourinary:  Negative for dysuria.   Musculoskeletal:  Negative for myalgias and neck pain.   Skin:  Negative for rash.   Neurological:  Negative for weakness and numbness.      Objective   Medical History:  Past Medical History:    Allergic    Arthritis    Asthma    Atrial fibrillation    Erectile dysfunction    Essential hypertension    Heart murmur    HL (hearing loss)    Mixed hyperlipidemia    Type 2 diabetes mellitus     Past Surgical History:    ANKLE SURGERY    FRACTURE SURGERY    OTHER SURGICAL HISTORY    Cyst removal from neck near spine     SPINE SURGERY      Family History   Problem Relation Age of Onset    Diabetes Mother     Hypertension Mother     Asthma Mother     Dementia Mother     Diabetes Brother     Hypertension Brother      Social History     Tobacco Use    Smoking status: Every Day     Current packs/day: 1.00     Average packs/day: 1 pack/day for 20.0 years (20.0 ttl pk-yrs)     Types: Cigarettes    Smokeless tobacco: Never    Tobacco comments:     Started smoking around age 19.   Substance Use Topics    Alcohol use: Not Currently     Comment: 'hardly any anymore'       Health Maintenance Due   Topic Date Due    Hepatitis B (1 of 3 - 19+ 3-dose series) Never done    COVID-19 Vaccine (5 - 2024-25 season) 09/01/2024    ZOSTER VACCINE (2 of 2) 01/09/2025    DIABETIC EYE EXAM  02/17/2025    COLORECTAL CANCER SCREENING  08/03/2025        Immunization History   Administered Date(s) Administered    COVID-19 (JIM) 05/01/2021    COVID-19 (PFIZER) 12YRS+ (COMIRNATY) 01/22/2024    COVID-19 (PFIZER) BIVALENT 12+YRS 01/23/2023    COVID-19 (PFIZER) Purple Cap Monovalent 01/04/2022    Fluzone (or Fluarix & Flulaval for VFC) >6mos 01/04/2022, 12/15/2022, 01/22/2024    Pneumococcal Conjugate 20-Valent (PCV20) 01/23/2023    Pneumococcal Polysaccharide (PPSV23) 12/13/2013    Shingrix 11/14/2024    Tdap 11/14/2024       No Known Allergies     Medications:    Current Outpatient Medications:     atorvastatin (LIPITOR) 20 MG tablet, Take 1 tablet by mouth Daily., Disp: , Rfl:     benazepril (LOTENSIN) 40 MG tablet, Take 1 tablet by mouth Daily., Disp: 90 tablet, Rfl: 3    cetirizine (zyrTEC) 10 MG tablet, Take 1 tablet by mouth Daily., Disp: , Rfl:     Continuous Glucose Sensor (FreeStyle Mc 3 Plus Sensor), Use 2 each Every 30 (Thirty) Days. Apply as directed every 15 days, Disp: 2 each, Rfl: 5    cyclobenzaprine (FLEXERIL) 10 MG tablet, Take 1 tablet by mouth Daily As Needed for Muscle Spasms., Disp: 40 tablet, Rfl: 1    Eliquis 5 MG tablet tablet, Take 1  "tablet by mouth 2 (Two) Times a Day., Disp: 180 tablet, Rfl: 3    empagliflozin (Jardiance) 25 MG tablet tablet, Take 1 tablet by mouth Daily., Disp: 90 tablet, Rfl: 1    fluticasone (FLONASE) 50 MCG/ACT nasal spray, As Needed., Disp: , Rfl:     insulin degludec (TRESIBA FLEXTOUCH) 100 UNIT/ML solution pen-injector injection, Inject 50 Units under the skin into the appropriate area as directed Daily for 180 days., Disp: 45 mL, Rfl: 1    Insulin Pen Needle (Pen Needles) 32G X 4 MM misc, Use 1 each Daily., Disp: 100 each, Rfl: 1    metFORMIN ER (GLUCOPHAGE-XR) 500 MG 24 hr tablet, Take 2 tablets by mouth Daily., Disp: 180 tablet, Rfl: 1    metoprolol succinate XL (TOPROL-XL) 50 MG 24 hr tablet, Take 1 tablet by mouth 2 (Two) Times a Day., Disp: 180 tablet, Rfl: 3    Semaglutide, 2 MG/DOSE, (Ozempic, 2 MG/DOSE,) 8 MG/3ML solution pen-injector, Inject 2 mg under the skin into the appropriate area as directed Every 7 (Seven) Days for 180 days. Prescription written for 90 days but can be dispensed as 30 days., Disp: 9 mL, Rfl: 1    sertraline (ZOLOFT) 100 MG tablet, Take 1 tablet by mouth Daily., Disp: 90 tablet, Rfl: 3    sildenafil (VIAGRA) 100 MG tablet, Take 0.5 to 1 tablet approximately 1 hour prior to sex as needed, Disp: 30 tablet, Rfl: 2    Vital Signs:   /69 (BP Location: Right arm, Patient Position: Sitting)   Pulse 75   Temp 98 °F (36.7 °C) (Oral)   Ht 172.7 cm (67.99\")   Wt 90.6 kg (199 lb 12.8 oz)   SpO2 99%   BMI 30.39 kg/m²       Physical Exam:  Physical Exam  Vitals reviewed.   Constitutional:       General: He is not in acute distress.     Appearance: He is not ill-appearing.   Eyes:      Pupils: Pupils are equal, round, and reactive to light.   Neck:      Comments: No thyromegaly  Cardiovascular:      Rate and Rhythm: Normal rate. Rhythm irregular.      Pulses:           Dorsalis pedis pulses are 2+ on the right side and 2+ on the left side.   Pulmonary:      Effort: Pulmonary effort is " normal.      Breath sounds: Normal breath sounds.   Abdominal:      General: There is no distension.      Palpations: Abdomen is soft.      Tenderness: There is no abdominal tenderness.   Musculoskeletal:      Cervical back: Neck supple.   Feet:      Right foot:      Protective Sensation: 3 sites tested.  3 sites sensed.      Skin integrity: Skin integrity normal. Callus present.      Left foot:      Protective Sensation: 3 sites tested.  3 sites sensed.      Skin integrity: Skin integrity normal. Callus present.   Lymphadenopathy:      Cervical: No cervical adenopathy.   Skin:     Findings: No lesion or rash.   Neurological:      Mental Status: He is alert.     Result Review   The following data was reviewed by Arik Franco MD on 02/06/2025.  Lab Results   Component Value Date    WBC 10.29 01/22/2024    HGB 17.6 01/22/2024    HCT 53.9 (H) 01/22/2024    MCV 97.5 (H) 01/22/2024     01/22/2024     Lab Results   Component Value Date    GLUCOSE 100 (H) 07/22/2024    BUN 13 07/22/2024    CREATININE 0.70 (L) 07/22/2024     07/22/2024    K 4.3 07/22/2024     07/22/2024    CALCIUM 9.5 07/22/2024    PROTEINTOT 6.8 07/22/2024    ALBUMIN 4.2 07/22/2024    ALT 18 07/22/2024    AST 17 07/22/2024    ALKPHOS 103 07/22/2024    BILITOT 0.5 07/22/2024    GLOB 2.6 07/22/2024    AGRATIO 1.6 07/22/2024    BCR 18.6 07/22/2024    ANIONGAP 10.8 07/22/2024    EGFR 112.3 07/22/2024     Lab Results   Component Value Date    CHOL 125 07/22/2024    CHLPL 147 05/27/2021    TRIG 77 07/22/2024    HDL 31 (L) 07/22/2024    LDL 78 07/22/2024     Lab Results   Component Value Date    TSH 3.640 01/22/2024     Lab Results   Component Value Date    HGBA1C 7.0 (A) 11/22/2024     Lab Results   Component Value Date    PSA 0.348 07/22/2024     BMI is >= 30 and <35. (Class 1 Obesity). The following options were offered after discussion;: exercise counseling/recommendations and nutrition counseling/recommendations          Assessment and Plan:   Today, we have reviewed his care.  Overall, Ayden seems well.  His blood pressure is well-controlled.  He is not quite due for a follow-up A1c yet.  For now, we will refill his medications and update labs as noted.  Tentative follow-up with me will be again in about 6 months.  He does have specialty appointments upcoming.  Regarding anxiety, this continues to be a struggle for him.  He finds himself getting agitated some.  We will press the dose of sertraline to 100 mg to see if it helps him with that.  No other short-term change is anticipated.    Diagnoses and all orders for this visit:    1. Type 2 diabetes mellitus with hyperglycemia, with long-term current use of insulin (Primary)  -     Comprehensive Metabolic Panel; Future  -     Microalbumin / Creatinine Urine Ratio - Urine, Clean Catch; Future    2. Essential hypertension  -     benazepril (LOTENSIN) 40 MG tablet; Take 1 tablet by mouth Daily.  Dispense: 90 tablet; Refill: 3  -     metoprolol succinate XL (TOPROL-XL) 50 MG 24 hr tablet; Take 1 tablet by mouth 2 (Two) Times a Day.  Dispense: 180 tablet; Refill: 3  -     Comprehensive Metabolic Panel; Future    3. Mixed hyperlipidemia  -     atorvastatin (LIPITOR) 20 MG tablet; Take 1 tablet by mouth Daily.  -     Comprehensive Metabolic Panel; Future  -     Lipid Panel; Future  -     TSH; Future    4. Longstanding persistent atrial fibrillation  -     Eliquis 5 MG tablet tablet; Take 1 tablet by mouth 2 (Two) Times a Day.  Dispense: 180 tablet; Refill: 3  -     metoprolol succinate XL (TOPROL-XL) 50 MG 24 hr tablet; Take 1 tablet by mouth 2 (Two) Times a Day.  Dispense: 180 tablet; Refill: 3  -     CBC (No Diff); Future    5. Generalized anxiety disorder  -     sertraline (ZOLOFT) 100 MG tablet; Take 1 tablet by mouth Daily.  Dispense: 90 tablet; Refill: 3    6. Generalized osteoarthritis  -     cyclobenzaprine (FLEXERIL) 10 MG tablet; Take 1 tablet by mouth Daily As Needed for  Muscle Spasms.  Dispense: 40 tablet; Refill: 1    7. Other long term (current) drug therapy  -     CBC (No Diff); Future    8. Encounter for immunization  -     COVID-19 (Pfizer) 12yrs+ (COMIRNATY)    Follow Up  Return in about 6 months (around 8/6/2025) for Recheck, Next scheduled follow up.  Patient was given instructions and counseling regarding his condition or for health maintenance advice. Please see specific information pulled into the AVS if appropriate.

## 2025-02-28 DIAGNOSIS — Z79.4 CONTROLLED TYPE 2 DIABETES MELLITUS WITHOUT COMPLICATION, WITH LONG-TERM CURRENT USE OF INSULIN: ICD-10-CM

## 2025-02-28 DIAGNOSIS — E11.9 CONTROLLED TYPE 2 DIABETES MELLITUS WITHOUT COMPLICATION, WITH LONG-TERM CURRENT USE OF INSULIN: ICD-10-CM

## 2025-03-03 RX ORDER — ACYCLOVIR 800 MG/1
TABLET ORAL
Qty: 2 EACH | Refills: 0 | Status: SHIPPED | OUTPATIENT
Start: 2025-03-03

## 2025-03-21 ENCOUNTER — TELEPHONE (OUTPATIENT)
Dept: FAMILY MEDICINE CLINIC | Age: 50
End: 2025-03-21
Payer: COMMERCIAL

## 2025-03-21 DIAGNOSIS — D72.829 LEUKOCYTOSIS, UNSPECIFIED TYPE: ICD-10-CM

## 2025-03-21 DIAGNOSIS — R74.8 ELEVATED ALKALINE PHOSPHATASE LEVEL: ICD-10-CM

## 2025-03-21 DIAGNOSIS — I10 ESSENTIAL HYPERTENSION: Primary | ICD-10-CM

## 2025-03-21 NOTE — TELEPHONE ENCOUNTER
----- Message from Zeinab RAPP sent at 2/7/2025  8:27 AM EST -----   TICKLE for CBC auto differential, CMP, gamma GT in 6 weeks for leukocytosis unspecified, elevated alkaline phosphatase, hypertension.  Thanks.

## 2025-03-28 ENCOUNTER — LAB (OUTPATIENT)
Dept: LAB | Facility: HOSPITAL | Age: 50
End: 2025-03-28
Payer: COMMERCIAL

## 2025-03-28 DIAGNOSIS — I10 ESSENTIAL HYPERTENSION: ICD-10-CM

## 2025-03-28 DIAGNOSIS — D72.829 LEUKOCYTOSIS, UNSPECIFIED TYPE: ICD-10-CM

## 2025-03-28 DIAGNOSIS — R74.8 ELEVATED ALKALINE PHOSPHATASE LEVEL: ICD-10-CM

## 2025-03-28 LAB
ALBUMIN SERPL-MCNC: 4 G/DL (ref 3.5–5.2)
ALBUMIN/GLOB SERPL: 1.5 G/DL
ALP SERPL-CCNC: 122 U/L (ref 39–117)
ALT SERPL W P-5'-P-CCNC: 18 U/L (ref 1–41)
ANION GAP SERPL CALCULATED.3IONS-SCNC: 12.3 MMOL/L (ref 5–15)
AST SERPL-CCNC: 20 U/L (ref 1–40)
BASOPHILS # BLD AUTO: 0.05 10*3/MM3 (ref 0–0.2)
BASOPHILS NFR BLD AUTO: 0.5 % (ref 0–1.5)
BILIRUB SERPL-MCNC: 0.4 MG/DL (ref 0–1.2)
BUN SERPL-MCNC: 15 MG/DL (ref 6–20)
BUN/CREAT SERPL: 16.5 (ref 7–25)
CALCIUM SPEC-SCNC: 8.8 MG/DL (ref 8.6–10.5)
CHLORIDE SERPL-SCNC: 103 MMOL/L (ref 98–107)
CO2 SERPL-SCNC: 22.7 MMOL/L (ref 22–29)
CREAT SERPL-MCNC: 0.91 MG/DL (ref 0.76–1.27)
DEPRECATED RDW RBC AUTO: 43.1 FL (ref 37–54)
EGFRCR SERPLBLD CKD-EPI 2021: 102 ML/MIN/1.73
EOSINOPHIL # BLD AUTO: 0.09 10*3/MM3 (ref 0–0.4)
EOSINOPHIL NFR BLD AUTO: 0.9 % (ref 0.3–6.2)
ERYTHROCYTE [DISTWIDTH] IN BLOOD BY AUTOMATED COUNT: 11.9 % (ref 12.3–15.4)
GGT SERPL-CCNC: 29 U/L (ref 8–61)
GLOBULIN UR ELPH-MCNC: 2.7 GM/DL
GLUCOSE SERPL-MCNC: 152 MG/DL (ref 65–99)
HCT VFR BLD AUTO: 49.6 % (ref 37.5–51)
HGB BLD-MCNC: 16.4 G/DL (ref 13–17.7)
IMM GRANULOCYTES # BLD AUTO: 0.02 10*3/MM3 (ref 0–0.05)
IMM GRANULOCYTES NFR BLD AUTO: 0.2 % (ref 0–0.5)
LYMPHOCYTES # BLD AUTO: 2.95 10*3/MM3 (ref 0.7–3.1)
LYMPHOCYTES NFR BLD AUTO: 29.7 % (ref 19.6–45.3)
MCH RBC QN AUTO: 31.8 PG (ref 26.6–33)
MCHC RBC AUTO-ENTMCNC: 33.1 G/DL (ref 31.5–35.7)
MCV RBC AUTO: 96.3 FL (ref 79–97)
MONOCYTES # BLD AUTO: 0.8 10*3/MM3 (ref 0.1–0.9)
MONOCYTES NFR BLD AUTO: 8.1 % (ref 5–12)
NEUTROPHILS NFR BLD AUTO: 6.01 10*3/MM3 (ref 1.7–7)
NEUTROPHILS NFR BLD AUTO: 60.6 % (ref 42.7–76)
PLATELET # BLD AUTO: 214 10*3/MM3 (ref 140–450)
PMV BLD AUTO: 9.3 FL (ref 6–12)
POTASSIUM SERPL-SCNC: 3.8 MMOL/L (ref 3.5–5.2)
PROT SERPL-MCNC: 6.7 G/DL (ref 6–8.5)
RBC # BLD AUTO: 5.15 10*6/MM3 (ref 4.14–5.8)
SODIUM SERPL-SCNC: 138 MMOL/L (ref 136–145)
WBC NRBC COR # BLD AUTO: 9.92 10*3/MM3 (ref 3.4–10.8)

## 2025-03-28 PROCEDURE — 85025 COMPLETE CBC W/AUTO DIFF WBC: CPT

## 2025-03-28 PROCEDURE — 82977 ASSAY OF GGT: CPT

## 2025-03-28 PROCEDURE — 36415 COLL VENOUS BLD VENIPUNCTURE: CPT

## 2025-03-28 PROCEDURE — 80053 COMPREHEN METABOLIC PANEL: CPT

## 2025-04-01 DIAGNOSIS — E11.9 CONTROLLED TYPE 2 DIABETES MELLITUS WITHOUT COMPLICATION, WITH LONG-TERM CURRENT USE OF INSULIN: ICD-10-CM

## 2025-04-01 DIAGNOSIS — Z79.4 CONTROLLED TYPE 2 DIABETES MELLITUS WITHOUT COMPLICATION, WITH LONG-TERM CURRENT USE OF INSULIN: ICD-10-CM

## 2025-04-02 RX ORDER — ACYCLOVIR 800 MG/1
TABLET ORAL
Qty: 2 EACH | Refills: 0 | Status: SHIPPED | OUTPATIENT
Start: 2025-04-02

## 2025-04-08 NOTE — PROGRESS NOTES
Chief Complaint  Follow-up (Controlled type 2 diabetes mellitus without complication, with long-term current use of insulin)    Referred By: No ref. provider found    Subjective          Patient or patient representative verbalized consent for the use of Ambient Listening during the visit with  JENISE Pacheco for chart documentation. 4/9/2025  15:09 EDT    Ayden Murillo presents to [facility] for diabetes medication management    History of Present Illness    Visit type:  follow-up  Diabetes type:  Type 2  Current diabetes status/concerns/issues:     History of Present Illness  The patient presents for evaluation of diabetes.    He is currently on a regimen of Tresiba 50 units daily, metformin extended release 500 mg twice daily, Ozempic 2 mg weekly, and Jardiance 25 mg weekly. He has been monitoring his blood glucose levels at home using a continuous glucose monitor, which has alerted him to hypoglycemic episodes when his blood glucose levels drop below 70. However, he reports that the accuracy of the device is questionable, with discrepancies of 5 to 6 points noted. He experiences symptoms of hypoglycemia such as shakiness and fatigue, particularly during his work hours when he does not have access to his fingerstick kit. Despite these episodes, he has been making efforts to adhere to a balanced diet.         Current Diabetes symptoms:    Polyuria: No   Polydipsia: No   Polyphagia: No   Blurred vision: No   Excessive fatigue: No  Known Diabetes complications:  Neuropathy: None; Location: N/A  Renal: Normal eGFR per current labs and Microalbuminuria - NEGATIVE  Eyes: No Retinopathy reported on current eye exam; Location: N/A; Date of Last Exam: 2/17/24  Amputation/Wounds: None  GI: None  Cardiovascular: Hypertension and Hyperlipidemia  ED: Patient Reported  Other: None  Hypoglycemia:  Level 1 hypoglycemia (54 mg/dL - 70 mg/dL); Frequency - 3% per CGM  Hypoglycemia  Symptoms:  shaking/tremors and weakness  Current diabetes treatment:  Tresiba 50 units once a day, metformin  mg 2 tablets once a day.  Ozempic 2 mg once weekly.  Jardiance 25 mg once daily   Blood glucose device:  [employer] CGM  Blood glucose monitoring frequency:  Continuous per CGM  Blood glucose range/average:  The 14-day sensor report shows an average glucose of 132 mg/dL, with 85% in target range ( mgdL), 11% in the high range (181-250 mg/dL), 1% in the very high range (>250 mg/dL), 3% in the low range (54-70 mg/dL) and 0% in the very low range (<54 mg/dL).   Glucose Source: Device Reviewed  Diet:  Limits high carb/sweet foods, Avoids sugary drinks  Activity/Exercise:   active with work    Past Medical History:   Diagnosis Date    Allergic 2013    Arthritis 2017    Asthma     Atrial fibrillation     Erectile dysfunction 2022    Essential hypertension     Heart murmur 1974    HL (hearing loss) 1983    Mixed hyperlipidemia     Type 2 diabetes mellitus      Past Surgical History:   Procedure Laterality Date    ANKLE SURGERY Left     FRACTURE SURGERY  2017    OTHER SURGICAL HISTORY      Cyst removal from neck near spine    SPINE SURGERY  2012     Family History   Problem Relation Age of Onset    Diabetes Mother     Hypertension Mother     Asthma Mother     Dementia Mother     Diabetes Brother     Hypertension Brother      Social History     Socioeconomic History    Marital status:     Number of children: 0   Tobacco Use    Smoking status: Every Day     Current packs/day: 1.00     Average packs/day: 1 pack/day for 20.0 years (20.0 ttl pk-yrs)     Types: Cigarettes    Smokeless tobacco: Never    Tobacco comments:     Started smoking around age 19.   Vaping Use    Vaping status: Some Days    Substances: THC   Substance and Sexual Activity    Alcohol use: Not Currently     Comment: 'hardly any anymore'    Drug use: Never    Sexual activity: Not Currently     Partners: Female     Birth  control/protection: Condom     No Known Allergies    Current Outpatient Medications:     atorvastatin (LIPITOR) 20 MG tablet, Take 1 tablet by mouth Daily., Disp: , Rfl:     benazepril (LOTENSIN) 40 MG tablet, Take 1 tablet by mouth Daily., Disp: 90 tablet, Rfl: 3    cetirizine (zyrTEC) 10 MG tablet, Take 1 tablet by mouth Daily., Disp: , Rfl:     Continuous Glucose Sensor (FreeStyle Mc 3 Sensor) misc, Use 1 each Every 14 (Fourteen) Days., Disp: 2 each, Rfl: 5    cyclobenzaprine (FLEXERIL) 10 MG tablet, Take 1 tablet by mouth Daily As Needed for Muscle Spasms., Disp: 40 tablet, Rfl: 1    Eliquis 5 MG tablet tablet, Take 1 tablet by mouth 2 (Two) Times a Day., Disp: 180 tablet, Rfl: 3    empagliflozin (Jardiance) 25 MG tablet tablet, Take 1 tablet by mouth Daily., Disp: 90 tablet, Rfl: 1    fluticasone (FLONASE) 50 MCG/ACT nasal spray, As Needed., Disp: , Rfl:     insulin degludec (TRESIBA FLEXTOUCH) 100 UNIT/ML solution pen-injector injection, Inject 50 Units under the skin into the appropriate area as directed Daily for 180 days., Disp: 45 mL, Rfl: 1    Insulin Pen Needle (Pen Needles) 32G X 4 MM misc, Use 1 each Daily., Disp: 100 each, Rfl: 1    metFORMIN ER (GLUCOPHAGE-XR) 500 MG 24 hr tablet, Take 2 tablets by mouth Daily., Disp: 180 tablet, Rfl: 1    metoprolol succinate XL (TOPROL-XL) 50 MG 24 hr tablet, Take 1 tablet by mouth 2 (Two) Times a Day., Disp: 180 tablet, Rfl: 3    Semaglutide, 2 MG/DOSE, (Ozempic, 2 MG/DOSE,) 8 MG/3ML solution pen-injector, Inject 2 mg under the skin into the appropriate area as directed Every 7 (Seven) Days for 180 days. Prescription written for 90 days but can be dispensed as 30 days., Disp: 9 mL, Rfl: 1    sertraline (ZOLOFT) 100 MG tablet, Take 1 tablet by mouth Daily., Disp: 90 tablet, Rfl: 3    sildenafil (VIAGRA) 100 MG tablet, Take 0.5 to 1 tablet approximately 1 hour prior to sex as needed, Disp: 30 tablet, Rfl: 2    Objective     Vitals:    04/09/25 1500   BP: 119/94  "  BP Location: Right arm   Patient Position: Sitting   Cuff Size: Large Adult   Pulse: 75   SpO2: 99%   Weight: 89.8 kg (198 lb)   Height: 172.7 cm (67.99\")   PainSc: 0-No pain     Body mass index is 30.11 kg/m².    Physical Exam  Constitutional:       Appearance: Normal appearance. He is obese.      Comments: Obesity (BMI 30 - 39.9) Pt Current BMI = 30.11    HENT:      Head: Normocephalic and atraumatic.      Right Ear: External ear normal.      Left Ear: External ear normal.      Nose: Nose normal.   Eyes:      Extraocular Movements: Extraocular movements intact.      Conjunctiva/sclera: Conjunctivae normal.   Pulmonary:      Effort: Pulmonary effort is normal.   Musculoskeletal:         General: Normal range of motion.      Cervical back: Normal range of motion.   Skin:     General: Skin is warm and dry.   Neurological:      General: No focal deficit present.      Mental Status: He is alert and oriented to person, place, and time. Mental status is at baseline.   Psychiatric:         Mood and Affect: Mood normal.         Behavior: Behavior normal.         Thought Content: Thought content normal.         Judgment: Judgment normal.             Result Review :   The following data was reviewed by: JENISE Pacheco on 03/28/2025:    Most Recent A1C          4/9/2025    15:05   HGBA1C Most Recent   Hemoglobin A1C 6.4  C      Details         C Corrected result               A1C Last 3 Results          7/12/2024    15:49 11/22/2024    14:48 4/9/2025    15:05   HGBA1C Last 3 Results   Hemoglobin A1C 7.6  7.0  6.4  C      Details         C Corrected result             A1c collected in the office today is 6.4%, indicating Controlled Type II diabetes.  This result is down from the prior result of 7% collected on 11/22/24       Creatinine   Date Value Ref Range Status   03/28/2025 0.91 0.76 - 1.27 mg/dL Final   02/06/2025 0.93 0.76 - 1.27 mg/dL Final     eGFR   Date Value Ref Range Status   03/28/2025 102.0 >60.0 " mL/min/1.73 Final   02/06/2025 100.0 >60.0 mL/min/1.73 Final     Labs collected on 2/6/25 show Normal values    Microalbumin, Urine   Date Value Ref Range Status   02/06/2025 <1.2 mg/dL Final   07/22/2024 <1.2 mg/dL Final     Creatinine, Urine   Date Value Ref Range Status   02/06/2025 66.1 mg/dL Final     Microalbumin/Creatinine Ratio   Date Value Ref Range Status   02/06/2025   Final     Comment:     Unable to calculate   05/27/2021 29.2 (H) 0.0 - 25.0 mg/g[Cre] Final     Urine microalbuminuria collected on 2/6/25 is negative for microalbuminuria    Total Cholesterol   Date Value Ref Range Status   02/06/2025 107 0 - 200 mg/dL Final   07/22/2024 125 0 - 200 mg/dL Final     Triglycerides   Date Value Ref Range Status   02/06/2025 66 0 - 150 mg/dL Final   07/22/2024 77 0 - 150 mg/dL Final     HDL Cholesterol   Date Value Ref Range Status   02/06/2025 31 (L) 40 - 60 mg/dL Final   07/22/2024 31 (L) 40 - 60 mg/dL Final     LDL Cholesterol    Date Value Ref Range Status   02/06/2025 62 0 - 100 mg/dL Final   07/22/2024 78 0 - 100 mg/dL Final     Lipid panel collected on 2/6/25 shows low HDL              Diagnoses and all orders for this visit:    1. Controlled type 2 diabetes mellitus without complication, with long-term current use of insulin (Primary)  -     POC Glycosylated Hemoglobin (Hb A1C)  -     Continuous Glucose Sensor (FreeStyle Mc 3 Sensor) misc; Use 1 each Every 14 (Fourteen) Days.  Dispense: 2 each; Refill: 5  -     empagliflozin (Jardiance) 25 MG tablet tablet; Take 1 tablet by mouth Daily.  Dispense: 90 tablet; Refill: 1  -     insulin degludec (TRESIBA FLEXTOUCH) 100 UNIT/ML solution pen-injector injection; Inject 50 Units under the skin into the appropriate area as directed Daily for 180 days.  Dispense: 45 mL; Refill: 1  -     Insulin Pen Needle (Pen Needles) 32G X 4 MM misc; Use 1 each Daily.  Dispense: 100 each; Refill: 1  -     metFORMIN ER (GLUCOPHAGE-XR) 500 MG 24 hr tablet; Take 2 tablets by  mouth Daily.  Dispense: 180 tablet; Refill: 1  -     Semaglutide, 2 MG/DOSE, (Ozempic, 2 MG/DOSE,) 8 MG/3ML solution pen-injector; Inject 2 mg under the skin into the appropriate area as directed Every 7 (Seven) Days for 180 days. Prescription written for 90 days but can be dispensed as 30 days.  Dispense: 9 mL; Refill: 1    2. Uses self-applied continuous glucose monitoring device    3. Obesity (BMI 30-39.9)        Assessment & Plan  1. Diabetes mellitus.  His average blood glucose level, as indicated by the sensor, is commendably at 132, with 85% of his readings within the target range. However, there is a concern of hypoglycemia, with 3% of his readings falling below the desired range. His A1c level has shown significant improvement, currently at 6.4%, down from 7% in November and 7.6% in July. To mitigate the risk of hypoglycemia while maintaining optimal control, the dosage of Tresiba will be reduced to 45 units daily. He has been advised to monitor his dietary intake closely. Refills for all his medications including Ozempic, metformin, Tresiba, Jardiance, Mc sensors, and pen needles will be provided. A 90-day supply will be given for all medications except the sensors, which will be refilled monthly. Should his blood glucose levels increase significantly, the Tresiba dosage may need to be adjusted back to the original level.          The patient will monitor his blood glucose levels per continuous glucose monitor.  If he develops problematic hyperglycemia or hypoglycemia or adverse drug reactions, he will contact the office for further instructions.        Follow Up     Return in about 3 months (around 7/9/2025) for Medication Management, CGM Follow-up.    Patient was given instructions and counseling regarding his condition or for health maintenance advice. Please see specific information pulled into the AVS if appropriate.     Aziza Brock, APRN  04/09/2025        Dictated Utilizing Dragon  Dictation.  Please note that portions of this note were completed with a voice recognition program.  Part of this note may be an electronic transcription/translation of spoken language to printed text using the Dragon Dictation System.

## 2025-04-09 ENCOUNTER — OFFICE VISIT (OUTPATIENT)
Dept: DIABETES SERVICES | Facility: CLINIC | Age: 50
End: 2025-04-09
Payer: COMMERCIAL

## 2025-04-09 VITALS
WEIGHT: 198 LBS | DIASTOLIC BLOOD PRESSURE: 94 MMHG | HEART RATE: 75 BPM | HEIGHT: 68 IN | OXYGEN SATURATION: 99 % | BODY MASS INDEX: 30.01 KG/M2 | SYSTOLIC BLOOD PRESSURE: 119 MMHG

## 2025-04-09 DIAGNOSIS — E66.9 OBESITY (BMI 30-39.9): ICD-10-CM

## 2025-04-09 DIAGNOSIS — E11.9 CONTROLLED TYPE 2 DIABETES MELLITUS WITHOUT COMPLICATION, WITH LONG-TERM CURRENT USE OF INSULIN: Primary | ICD-10-CM

## 2025-04-09 DIAGNOSIS — Z97.8 USES SELF-APPLIED CONTINUOUS GLUCOSE MONITORING DEVICE: ICD-10-CM

## 2025-04-09 DIAGNOSIS — Z79.4 CONTROLLED TYPE 2 DIABETES MELLITUS WITHOUT COMPLICATION, WITH LONG-TERM CURRENT USE OF INSULIN: Primary | ICD-10-CM

## 2025-04-09 LAB
EXPIRATION DATE: ABNORMAL
HBA1C MFR BLD: 6.4 % (ref 4.5–5.7)
Lab: ABNORMAL

## 2025-04-09 PROCEDURE — 99214 OFFICE O/P EST MOD 30 MIN: CPT | Performed by: NURSE PRACTITIONER

## 2025-04-09 RX ORDER — SEMAGLUTIDE 2.68 MG/ML
2 INJECTION, SOLUTION SUBCUTANEOUS
Qty: 9 ML | Refills: 1 | Status: SHIPPED | OUTPATIENT
Start: 2025-04-09 | End: 2025-10-06

## 2025-04-09 RX ORDER — ACYCLOVIR 800 MG/1
1 TABLET ORAL
Qty: 2 EACH | Refills: 5 | Status: SHIPPED | OUTPATIENT
Start: 2025-04-09

## 2025-04-09 RX ORDER — METFORMIN HYDROCHLORIDE 500 MG/1
1000 TABLET, EXTENDED RELEASE ORAL DAILY
Qty: 180 TABLET | Refills: 1 | Status: SHIPPED | OUTPATIENT
Start: 2025-04-09

## 2025-04-09 RX ORDER — PEN NEEDLE, DIABETIC 30 GX3/16"
1 NEEDLE, DISPOSABLE MISCELLANEOUS DAILY
Qty: 100 EACH | Refills: 1 | Status: SHIPPED | OUTPATIENT
Start: 2025-04-09

## 2025-07-25 ENCOUNTER — OFFICE VISIT (OUTPATIENT)
Dept: DIABETES SERVICES | Facility: CLINIC | Age: 50
End: 2025-07-25
Payer: COMMERCIAL

## 2025-07-25 VITALS
DIASTOLIC BLOOD PRESSURE: 88 MMHG | OXYGEN SATURATION: 97 % | HEIGHT: 68 IN | HEART RATE: 94 BPM | BODY MASS INDEX: 29.55 KG/M2 | WEIGHT: 195 LBS | SYSTOLIC BLOOD PRESSURE: 103 MMHG

## 2025-07-25 DIAGNOSIS — Z79.4 TYPE 2 DIABETES MELLITUS WITHOUT COMPLICATION, WITH LONG-TERM CURRENT USE OF INSULIN: ICD-10-CM

## 2025-07-25 DIAGNOSIS — Z97.8 USES SELF-APPLIED CONTINUOUS GLUCOSE MONITORING DEVICE: ICD-10-CM

## 2025-07-25 DIAGNOSIS — E66.3 OVERWEIGHT (BMI 25.0-29.9): ICD-10-CM

## 2025-07-25 DIAGNOSIS — Z79.4 UNCONTROLLED TYPE 2 DIABETES MELLITUS WITH HYPERGLYCEMIA, WITH LONG-TERM CURRENT USE OF INSULIN: Primary | ICD-10-CM

## 2025-07-25 DIAGNOSIS — E11.65 UNCONTROLLED TYPE 2 DIABETES MELLITUS WITH HYPERGLYCEMIA, WITH LONG-TERM CURRENT USE OF INSULIN: Primary | ICD-10-CM

## 2025-07-25 DIAGNOSIS — E11.9 TYPE 2 DIABETES MELLITUS WITHOUT COMPLICATION, WITH LONG-TERM CURRENT USE OF INSULIN: ICD-10-CM

## 2025-07-25 LAB
EXPIRATION DATE: ABNORMAL
HBA1C MFR BLD: 7.5 % (ref 4.5–5.7)
Lab: ABNORMAL

## 2025-07-25 RX ORDER — PEN NEEDLE, DIABETIC 30 GX3/16"
1 NEEDLE, DISPOSABLE MISCELLANEOUS DAILY
Qty: 100 EACH | Refills: 1 | Status: SHIPPED | OUTPATIENT
Start: 2025-07-25

## 2025-07-25 RX ORDER — SEMAGLUTIDE 2.68 MG/ML
2 INJECTION, SOLUTION SUBCUTANEOUS
Qty: 9 ML | Refills: 1 | Status: SHIPPED | OUTPATIENT
Start: 2025-07-25 | End: 2026-01-21

## 2025-07-25 RX ORDER — METFORMIN HYDROCHLORIDE 500 MG/1
1000 TABLET, EXTENDED RELEASE ORAL DAILY
Qty: 180 TABLET | Refills: 1 | Status: SHIPPED | OUTPATIENT
Start: 2025-07-25

## 2025-07-25 RX ORDER — ACYCLOVIR 800 MG/1
1 TABLET ORAL
Qty: 2 EACH | Refills: 5 | Status: SHIPPED | OUTPATIENT
Start: 2025-07-25

## 2025-07-25 NOTE — PROGRESS NOTES
Chief Complaint  Diabetes (Med management, A1C, CGM Eval)    Referred By: No ref. provider found    Subjective          Patient or patient representative verbalized consent for the use of Ambient Listening during the visit with  JENISE Pacheco for chart documentation. 7/27/2025  22:43 EDT    Ayden Murillo presents to [facility] for diabetes medication management    History of Present Illness    Visit type:  follow-up  Diabetes type:  Type 2  Current diabetes status/concerns/issues:     History of Present Illness  The patient presents for evaluation of diabetes.    He is currently on a regimen of Tresiba 45 units daily, metformin extended release 500 mg (2 tablets once daily), Ozempic 2 mg weekly, and Jardiance 25 mg daily. He reports no episodes of hypoglycemia and has been monitoring his blood glucose levels at home, which have consistently been above 170. He acknowledges occasional dietary indiscretions but primarily consumes tea with less than half a cup of sugar. He has not experienced any illness or missed any medication doses since his last visit. He notes an increased intake of fresh fruits, particularly peaches and watermelon, during the current season and mentions a preference for starchy foods like potatoes. His weight has decreased by 3 pounds since the last visit. He reports no new health issues.        Current Diabetes symptoms:    Polyuria: No   Polydipsia: No   Polyphagia: No   Blurred vision: No   Excessive fatigue: No  Known Diabetes complications:  Neuropathy: None; Location: N/A  Renal: Normal eGFR per current labs and Microalbuminuria - NEGATIVE  Eyes: No Retinopathy reported on current eye exam; Location: N/A; Date of Last Exam: 2/17/24  Amputation/Wounds: None  GI: None  Cardiovascular: Hypertension and Hyperlipidemia  ED: Patient Reported  Other: None  Hypoglycemia:  None reported at this time  Hypoglycemia Symptoms:  No hypoglycemia at this  time  Current diabetes treatment:  Tresiba 45 units once a day, metformin  mg 2 tablets once a day.  Ozempic 2 mg once weekly.  Jardiance 25 mg once daily   Blood glucose device:  [employer] CGM  Blood glucose monitoring frequency:  Continuous per CGM  Blood glucose range/average:  The 14-day sensor report shows an average glucose of 178 mg/dL, with 55% in target range ( mgdL), 37% in the high range (181-250 mg/dL), 8% in the very high range (>250 mg/dL), 0% in the low range (54-70 mg/dL) and 0% in the very low range (<54 mg/dL).   Glucose Source: Device Reviewed  Diet:  he admits to drinking sweet tea  Activity/Exercise:  active with work    Past Medical History:   Diagnosis Date    Allergic 2013    Arthritis 2017    Asthma     Atrial fibrillation     Erectile dysfunction 2022    Essential hypertension     Heart murmur 1974    HL (hearing loss) 1983    Mixed hyperlipidemia     Type 2 diabetes mellitus      Past Surgical History:   Procedure Laterality Date    ANKLE SURGERY Left     FRACTURE SURGERY  2017    OTHER SURGICAL HISTORY      Cyst removal from neck near spine    SPINE SURGERY  2012     Family History   Problem Relation Age of Onset    Diabetes Mother     Hypertension Mother     Asthma Mother     Dementia Mother     Diabetes Brother     Hypertension Brother      Social History     Socioeconomic History    Marital status:     Number of children: 0   Tobacco Use    Smoking status: Every Day     Current packs/day: 1.00     Average packs/day: 1 pack/day for 20.0 years (20.0 ttl pk-yrs)     Types: Cigarettes    Smokeless tobacco: Never    Tobacco comments:     Started smoking around age 19.   Vaping Use    Vaping status: Some Days    Substances: THC   Substance and Sexual Activity    Alcohol use: Not Currently     Comment: 'hardly any anymore'    Drug use: Never    Sexual activity: Not Currently     Partners: Female     Birth control/protection: Condom     No Known Allergies    Current Outpatient  Medications:     atorvastatin (LIPITOR) 20 MG tablet, Take 1 tablet by mouth Daily., Disp: , Rfl:     benazepril (LOTENSIN) 40 MG tablet, Take 1 tablet by mouth Daily., Disp: 90 tablet, Rfl: 3    cetirizine (zyrTEC) 10 MG tablet, Take 1 tablet by mouth Daily., Disp: , Rfl:     Continuous Glucose Sensor (FreeStyle Mc 3 Sensor) misc, Use 1 each Every 14 (Fourteen) Days., Disp: 2 each, Rfl: 5    cyclobenzaprine (FLEXERIL) 10 MG tablet, Take 1 tablet by mouth Daily As Needed for Muscle Spasms., Disp: 40 tablet, Rfl: 1    Eliquis 5 MG tablet tablet, Take 1 tablet by mouth 2 (Two) Times a Day., Disp: 180 tablet, Rfl: 3    empagliflozin (Jardiance) 25 MG tablet tablet, Take 1 tablet by mouth Daily., Disp: 90 tablet, Rfl: 1    fluticasone (FLONASE) 50 MCG/ACT nasal spray, As Needed., Disp: , Rfl:     insulin degludec (TRESIBA FLEXTOUCH) 100 UNIT/ML solution pen-injector injection, Inject 50 Units under the skin into the appropriate area as directed Daily for 180 days., Disp: 45 mL, Rfl: 1    Insulin Pen Needle (Pen Needles) 32G X 4 MM misc, Use 1 each Daily., Disp: 100 each, Rfl: 1    metFORMIN ER (GLUCOPHAGE-XR) 500 MG 24 hr tablet, Take 2 tablets by mouth Daily., Disp: 180 tablet, Rfl: 1    metoprolol succinate XL (TOPROL-XL) 50 MG 24 hr tablet, Take 1 tablet by mouth 2 (Two) Times a Day., Disp: 180 tablet, Rfl: 3    Semaglutide, 2 MG/DOSE, (Ozempic, 2 MG/DOSE,) 8 MG/3ML solution pen-injector, Inject 2 mg under the skin into the appropriate area as directed Every 7 (Seven) Days for 180 days. Prescription written for 90 days but can be dispensed as 30 days., Disp: 9 mL, Rfl: 1    sertraline (ZOLOFT) 100 MG tablet, Take 1 tablet by mouth Daily., Disp: 90 tablet, Rfl: 3    sildenafil (VIAGRA) 100 MG tablet, Take 0.5 to 1 tablet approximately 1 hour prior to sex as needed, Disp: 30 tablet, Rfl: 2    Objective     Vitals:    07/25/25 1537   BP: 103/88   BP Location: Left arm   Patient Position: Sitting   Cuff Size: Adult  "  Pulse: 94   SpO2: 97%   Weight: 88.5 kg (195 lb)   Height: 172.7 cm (67.99\")     Body mass index is 29.66 kg/m².    Physical Exam  Constitutional:       Appearance: Normal appearance.      Comments: Overweight (BMI 25 - 29.9) Pt Current BMI = 29.66    HENT:      Head: Normocephalic and atraumatic.      Right Ear: External ear normal.      Left Ear: External ear normal.      Nose: Nose normal.   Eyes:      Extraocular Movements: Extraocular movements intact.      Conjunctiva/sclera: Conjunctivae normal.   Pulmonary:      Effort: Pulmonary effort is normal.   Musculoskeletal:         General: Normal range of motion.      Cervical back: Normal range of motion.   Skin:     General: Skin is warm and dry.   Neurological:      General: No focal deficit present.      Mental Status: He is alert and oriented to person, place, and time. Mental status is at baseline.   Psychiatric:         Mood and Affect: Mood normal.         Behavior: Behavior normal.         Thought Content: Thought content normal.         Judgment: Judgment normal.             Result Review :   The following data was reviewed by: JENISE Pacheco on 07/25/2025:    Most Recent A1C          7/25/2025    15:46   HGBA1C Most Recent   Hemoglobin A1C 7.5        A1C Last 3 Results          11/22/2024    14:48 4/9/2025    15:05 7/25/2025    15:46   HGBA1C Last 3 Results   Hemoglobin A1C 7.0  6.4  C 7.5       Details         C Corrected result             A1c collected in the office today is 7.5%, indicating Uncontrolled Type II diabetes.  This result is up from the prior result of 6.4% collected on 4/9/25               Diagnoses and all orders for this visit:    1. Uncontrolled type 2 diabetes mellitus with hyperglycemia, with long-term current use of insulin (Primary)  -     POC Glycosylated Hemoglobin (Hb A1C)  -     Continuous Glucose Sensor (FreeStyle Mc 3 Sensor) misc; Use 1 each Every 14 (Fourteen) Days.  Dispense: 2 each; Refill: 5  -     " empagliflozin (Jardiance) 25 MG tablet tablet; Take 1 tablet by mouth Daily.  Dispense: 90 tablet; Refill: 1  -     insulin degludec (TRESIBA FLEXTOUCH) 100 UNIT/ML solution pen-injector injection; Inject 50 Units under the skin into the appropriate area as directed Daily for 180 days.  Dispense: 45 mL; Refill: 1  -     metFORMIN ER (GLUCOPHAGE-XR) 500 MG 24 hr tablet; Take 2 tablets by mouth Daily.  Dispense: 180 tablet; Refill: 1  -     Semaglutide, 2 MG/DOSE, (Ozempic, 2 MG/DOSE,) 8 MG/3ML solution pen-injector; Inject 2 mg under the skin into the appropriate area as directed Every 7 (Seven) Days for 180 days. Prescription written for 90 days but can be dispensed as 30 days.  Dispense: 9 mL; Refill: 1  -     Insulin Pen Needle (Pen Needles) 32G X 4 MM misc; Use 1 each Daily.  Dispense: 100 each; Refill: 1    2. Type 2 diabetes mellitus without complication, with long-term current use of insulin    3. Uses self-applied continuous glucose monitoring device    4. Overweight (BMI 25.0-29.9)        Assessment & Plan  1. Diabetes mellitus: Not at treatment goal.  - Glucose averages 178 with 55% of readings within the target range and no instances of hypoglycemia.  - A1c has increased to 7.5 from 6.4 in 04/2025. Weight has decreased by 3 pounds since the last visit.  - Advised to reduce intake of sugary drinks and maintain a balanced diet. Discussion about minimizing sugary fruits and starchy foods.  - Increase Tresiba to 50 units once a day. Refills for all diabetes medications, including pen needles, Ozempic 2 mg, metformin extended release 500 mg, Tresiba, Jardiance 25 mg, and sensors, will be provided. Encouraged to engage in physical activity as tolerated.          The patient will monitor his blood glucose levels per continuous glucose monitor.  If he develops problematic hyperglycemia or hypoglycemia or adverse drug reactions, he will contact the office for further instructions.        Follow Up     Return in  about 3 months (around 10/25/2025) for Medication Management, CGM Follow-up.    Patient was given instructions and counseling regarding his condition or for health maintenance advice. Please see specific information pulled into the AVS if appropriate.     Aziza Brock, JENISE  07/25/2025        Dictated Utilizing Dragon Dictation.  Please note that portions of this note were completed with a voice recognition program.  Part of this note may be an electronic transcription/translation of spoken language to printed text using the Dragon Dictation System.

## 2025-08-07 ENCOUNTER — OFFICE VISIT (OUTPATIENT)
Dept: FAMILY MEDICINE CLINIC | Age: 50
End: 2025-08-07
Payer: COMMERCIAL

## 2025-08-07 ENCOUNTER — LAB (OUTPATIENT)
Dept: LAB | Facility: HOSPITAL | Age: 50
End: 2025-08-07
Payer: COMMERCIAL

## 2025-08-07 VITALS
DIASTOLIC BLOOD PRESSURE: 78 MMHG | BODY MASS INDEX: 29.8 KG/M2 | OXYGEN SATURATION: 97 % | HEIGHT: 68 IN | WEIGHT: 196.6 LBS | HEART RATE: 69 BPM | SYSTOLIC BLOOD PRESSURE: 111 MMHG | TEMPERATURE: 97.8 F

## 2025-08-07 DIAGNOSIS — E11.65 TYPE 2 DIABETES MELLITUS WITH HYPERGLYCEMIA, WITH LONG-TERM CURRENT USE OF INSULIN: ICD-10-CM

## 2025-08-07 DIAGNOSIS — Z79.4 TYPE 2 DIABETES MELLITUS WITH HYPERGLYCEMIA, WITH LONG-TERM CURRENT USE OF INSULIN: ICD-10-CM

## 2025-08-07 DIAGNOSIS — M15.9 GENERALIZED OSTEOARTHRITIS: ICD-10-CM

## 2025-08-07 DIAGNOSIS — I10 ESSENTIAL HYPERTENSION: ICD-10-CM

## 2025-08-07 DIAGNOSIS — Z12.5 PROSTATE CANCER SCREENING: ICD-10-CM

## 2025-08-07 DIAGNOSIS — Z00.00 PHYSICAL EXAM: Primary | ICD-10-CM

## 2025-08-07 DIAGNOSIS — Z12.11 COLON CANCER SCREENING: ICD-10-CM

## 2025-08-07 DIAGNOSIS — F41.1 GENERALIZED ANXIETY DISORDER: ICD-10-CM

## 2025-08-07 DIAGNOSIS — F17.210 NICOTINE DEPENDENCE, CIGARETTES, UNCOMPLICATED: ICD-10-CM

## 2025-08-07 DIAGNOSIS — I48.11 LONGSTANDING PERSISTENT ATRIAL FIBRILLATION: ICD-10-CM

## 2025-08-07 DIAGNOSIS — E78.2 MIXED HYPERLIPIDEMIA: ICD-10-CM

## 2025-08-07 DIAGNOSIS — Z00.00 PHYSICAL EXAM: ICD-10-CM

## 2025-08-07 LAB
ALBUMIN SERPL-MCNC: 4 G/DL (ref 3.5–5.2)
ALBUMIN/GLOB SERPL: 1.3 G/DL
ALP SERPL-CCNC: 115 U/L (ref 39–117)
ALT SERPL W P-5'-P-CCNC: 25 U/L (ref 1–41)
ANION GAP SERPL CALCULATED.3IONS-SCNC: 11.7 MMOL/L (ref 5–15)
AST SERPL-CCNC: 24 U/L (ref 1–40)
BASOPHILS # BLD AUTO: 0.08 10*3/MM3 (ref 0–0.2)
BASOPHILS NFR BLD AUTO: 0.7 % (ref 0–1.5)
BILIRUB SERPL-MCNC: 0.5 MG/DL (ref 0–1.2)
BUN SERPL-MCNC: 10 MG/DL (ref 6–20)
BUN/CREAT SERPL: 11.4 (ref 7–25)
CALCIUM SPEC-SCNC: 9.3 MG/DL (ref 8.6–10.5)
CHLORIDE SERPL-SCNC: 106 MMOL/L (ref 98–107)
CO2 SERPL-SCNC: 23.3 MMOL/L (ref 22–29)
CREAT SERPL-MCNC: 0.88 MG/DL (ref 0.76–1.27)
DEPRECATED RDW RBC AUTO: 42.8 FL (ref 37–54)
EGFRCR SERPLBLD CKD-EPI 2021: 104.1 ML/MIN/1.73
EOSINOPHIL # BLD AUTO: 0.12 10*3/MM3 (ref 0–0.4)
EOSINOPHIL NFR BLD AUTO: 1.1 % (ref 0.3–6.2)
ERYTHROCYTE [DISTWIDTH] IN BLOOD BY AUTOMATED COUNT: 12.3 % (ref 12.3–15.4)
GLOBULIN UR ELPH-MCNC: 3.1 GM/DL
GLUCOSE SERPL-MCNC: 122 MG/DL (ref 65–99)
HCT VFR BLD AUTO: 49.2 % (ref 37.5–51)
HGB BLD-MCNC: 16.7 G/DL (ref 13–17.7)
IMM GRANULOCYTES # BLD AUTO: 0.04 10*3/MM3 (ref 0–0.05)
IMM GRANULOCYTES NFR BLD AUTO: 0.4 % (ref 0–0.5)
LYMPHOCYTES # BLD AUTO: 2.84 10*3/MM3 (ref 0.7–3.1)
LYMPHOCYTES NFR BLD AUTO: 26.3 % (ref 19.6–45.3)
MCH RBC QN AUTO: 32.7 PG (ref 26.6–33)
MCHC RBC AUTO-ENTMCNC: 33.9 G/DL (ref 31.5–35.7)
MCV RBC AUTO: 96.3 FL (ref 79–97)
MONOCYTES # BLD AUTO: 1.12 10*3/MM3 (ref 0.1–0.9)
MONOCYTES NFR BLD AUTO: 10.4 % (ref 5–12)
NEUTROPHILS NFR BLD AUTO: 6.61 10*3/MM3 (ref 1.7–7)
NEUTROPHILS NFR BLD AUTO: 61.1 % (ref 42.7–76)
NRBC BLD AUTO-RTO: 0 /100 WBC (ref 0–0.2)
PLATELET # BLD AUTO: 210 10*3/MM3 (ref 140–450)
PMV BLD AUTO: 10 FL (ref 6–12)
POTASSIUM SERPL-SCNC: 4.2 MMOL/L (ref 3.5–5.2)
PROT SERPL-MCNC: 7.1 G/DL (ref 6–8.5)
PSA SERPL-MCNC: 0.4 NG/ML (ref 0–4)
RBC # BLD AUTO: 5.11 10*6/MM3 (ref 4.14–5.8)
SODIUM SERPL-SCNC: 141 MMOL/L (ref 136–145)
TESTOST SERPL-MCNC: 361 NG/DL (ref 193–740)
WBC NRBC COR # BLD AUTO: 10.81 10*3/MM3 (ref 3.4–10.8)

## 2025-08-07 PROCEDURE — 84403 ASSAY OF TOTAL TESTOSTERONE: CPT

## 2025-08-07 PROCEDURE — G0103 PSA SCREENING: HCPCS

## 2025-08-07 PROCEDURE — 85025 COMPLETE CBC W/AUTO DIFF WBC: CPT

## 2025-08-07 PROCEDURE — 36415 COLL VENOUS BLD VENIPUNCTURE: CPT

## 2025-08-07 PROCEDURE — 80053 COMPREHEN METABOLIC PANEL: CPT

## 2025-08-07 RX ORDER — METOPROLOL SUCCINATE 50 MG/1
75 TABLET, EXTENDED RELEASE ORAL 2 TIMES DAILY
Qty: 270 TABLET | Refills: 3 | Status: SHIPPED | OUTPATIENT
Start: 2025-08-07

## 2025-08-07 RX ORDER — CYCLOBENZAPRINE HCL 10 MG
10 TABLET ORAL DAILY PRN
Qty: 40 TABLET | Refills: 1 | Status: SHIPPED | OUTPATIENT
Start: 2025-08-07

## 2025-08-07 RX ORDER — SERTRALINE HYDROCHLORIDE 100 MG/1
100 TABLET, FILM COATED ORAL DAILY
Qty: 90 TABLET | Refills: 3 | Status: SHIPPED | OUTPATIENT
Start: 2025-08-07

## 2025-08-07 RX ORDER — SILDENAFIL 100 MG/1
TABLET, FILM COATED ORAL
Qty: 30 TABLET | Refills: 2 | Status: SHIPPED | OUTPATIENT
Start: 2025-08-07

## 2025-08-07 RX ORDER — APIXABAN 5 MG/1
5 TABLET, FILM COATED ORAL 2 TIMES DAILY
Qty: 180 TABLET | Refills: 3 | Status: SHIPPED | OUTPATIENT
Start: 2025-08-07

## 2025-08-07 RX ORDER — BENAZEPRIL HYDROCHLORIDE 40 MG/1
40 TABLET ORAL DAILY
Qty: 90 TABLET | Refills: 3 | Status: SHIPPED | OUTPATIENT
Start: 2025-08-07

## 2025-08-08 ENCOUNTER — RESULTS FOLLOW-UP (OUTPATIENT)
Dept: FAMILY MEDICINE CLINIC | Age: 50
End: 2025-08-08
Payer: COMMERCIAL

## 2025-08-12 ENCOUNTER — TELEPHONE (OUTPATIENT)
Dept: ADMINISTRATIVE | Facility: OTHER | Age: 50
End: 2025-08-12
Payer: COMMERCIAL